# Patient Record
Sex: FEMALE | Race: WHITE | Employment: UNEMPLOYED | URBAN - METROPOLITAN AREA
[De-identification: names, ages, dates, MRNs, and addresses within clinical notes are randomized per-mention and may not be internally consistent; named-entity substitution may affect disease eponyms.]

---

## 2020-04-21 ENCOUNTER — VIRTUAL VISIT (OUTPATIENT)
Dept: FAMILY MEDICINE CLINIC | Age: 45
End: 2020-04-21

## 2020-04-21 ENCOUNTER — E-VISIT (OUTPATIENT)
Dept: FAMILY MEDICINE CLINIC | Age: 45
End: 2020-04-21

## 2020-04-21 VITALS — DIASTOLIC BLOOD PRESSURE: 109 MMHG | SYSTOLIC BLOOD PRESSURE: 160 MMHG | HEART RATE: 87 BPM

## 2020-04-21 DIAGNOSIS — E11.42 CONTROLLED TYPE 2 DIABETES MELLITUS WITH DIABETIC POLYNEUROPATHY, WITHOUT LONG-TERM CURRENT USE OF INSULIN (HCC): ICD-10-CM

## 2020-04-21 DIAGNOSIS — F99 INSOMNIA DUE TO OTHER MENTAL DISORDER: ICD-10-CM

## 2020-04-21 DIAGNOSIS — M79.7 FIBROMYALGIA: ICD-10-CM

## 2020-04-21 DIAGNOSIS — G93.2 INTRACRANIAL HYPERTENSION: ICD-10-CM

## 2020-04-21 DIAGNOSIS — F51.05 INSOMNIA DUE TO OTHER MENTAL DISORDER: ICD-10-CM

## 2020-04-21 DIAGNOSIS — Z76.89 ENCOUNTER TO ESTABLISH CARE: Primary | ICD-10-CM

## 2020-04-21 DIAGNOSIS — K21.9 GASTROESOPHAGEAL REFLUX DISEASE WITHOUT ESOPHAGITIS: ICD-10-CM

## 2020-04-21 DIAGNOSIS — R51.9 CHRONIC NONINTRACTABLE HEADACHE, UNSPECIFIED HEADACHE TYPE: ICD-10-CM

## 2020-04-21 DIAGNOSIS — F41.1 GAD (GENERALIZED ANXIETY DISORDER): ICD-10-CM

## 2020-04-21 DIAGNOSIS — L84 FOOT CALLUS: ICD-10-CM

## 2020-04-21 DIAGNOSIS — K62.5 RECTAL BLEEDING: ICD-10-CM

## 2020-04-21 DIAGNOSIS — Z98.2 S/P VP SHUNT: ICD-10-CM

## 2020-04-21 DIAGNOSIS — G89.29 CHRONIC NONINTRACTABLE HEADACHE, UNSPECIFIED HEADACHE TYPE: ICD-10-CM

## 2020-04-21 DIAGNOSIS — I10 ESSENTIAL HYPERTENSION: ICD-10-CM

## 2020-04-21 RX ORDER — METFORMIN HYDROCHLORIDE 500 MG/1
TABLET ORAL 2 TIMES DAILY WITH MEALS
COMMUNITY
End: 2020-04-21 | Stop reason: SDUPTHER

## 2020-04-21 RX ORDER — HYDROCHLOROTHIAZIDE 25 MG/1
25 TABLET ORAL DAILY
COMMUNITY
End: 2020-04-21 | Stop reason: SDUPTHER

## 2020-04-21 RX ORDER — DULOXETIN HYDROCHLORIDE 60 MG/1
60 CAPSULE, DELAYED RELEASE ORAL DAILY
Qty: 90 CAP | Refills: 0 | Status: SHIPPED | OUTPATIENT
Start: 2020-04-21 | End: 2020-09-15 | Stop reason: SDUPTHER

## 2020-04-21 RX ORDER — METFORMIN HYDROCHLORIDE 500 MG/1
1000 TABLET ORAL 2 TIMES DAILY WITH MEALS
Qty: 360 TAB | Refills: 0 | Status: SHIPPED | OUTPATIENT
Start: 2020-04-21 | End: 2020-07-15 | Stop reason: SDUPTHER

## 2020-04-21 RX ORDER — GABAPENTIN 600 MG/1
1200 TABLET ORAL 3 TIMES DAILY
COMMUNITY
End: 2020-04-27 | Stop reason: SDUPTHER

## 2020-04-21 RX ORDER — OMEPRAZOLE 40 MG/1
40 CAPSULE, DELAYED RELEASE ORAL DAILY
COMMUNITY
End: 2020-04-21 | Stop reason: SDUPTHER

## 2020-04-21 RX ORDER — DULOXETIN HYDROCHLORIDE 60 MG/1
60 CAPSULE, DELAYED RELEASE ORAL DAILY
COMMUNITY
End: 2020-04-21 | Stop reason: SDUPTHER

## 2020-04-21 RX ORDER — TRAZODONE HYDROCHLORIDE 50 MG/1
150 TABLET ORAL
Qty: 270 TAB | Refills: 0 | Status: SHIPPED | OUTPATIENT
Start: 2020-04-21 | End: 2020-07-31 | Stop reason: SDUPTHER

## 2020-04-21 RX ORDER — HYDROCHLOROTHIAZIDE 25 MG/1
25 TABLET ORAL DAILY
Qty: 90 TAB | Refills: 0 | Status: SHIPPED | OUTPATIENT
Start: 2020-04-21 | End: 2020-07-15 | Stop reason: SDUPTHER

## 2020-04-21 RX ORDER — TRAZODONE HYDROCHLORIDE 50 MG/1
100 TABLET ORAL
COMMUNITY
End: 2020-04-21 | Stop reason: SDUPTHER

## 2020-04-21 RX ORDER — OMEPRAZOLE 40 MG/1
40 CAPSULE, DELAYED RELEASE ORAL DAILY
Qty: 90 CAP | Refills: 0 | Status: SHIPPED | OUTPATIENT
Start: 2020-04-21 | End: 2020-07-22 | Stop reason: SDUPTHER

## 2020-04-21 RX ORDER — LISINOPRIL 10 MG/1
10 TABLET ORAL DAILY
Qty: 60 TAB | Refills: 0 | Status: SHIPPED | OUTPATIENT
Start: 2020-04-21 | End: 2020-06-10 | Stop reason: SDUPTHER

## 2020-04-21 NOTE — PROGRESS NOTES
Chief Complaint   Patient presents with    New Patient     new to the area   [address]     states she has DM, Fibromyalgia, GERD, and intracranial HTN. Hiatal Hernia.  Diabetes     states she needs follow up DM II     \"REVIEWED RECORD IN PREPARATION FOR VISIT AND HAVE OBTAINED THE NECESSARY DOCUMENTATION\"  1. Have you been to the ER, urgent care clinic since your last visit? Hospitalized since your last visit? No    2. Have you seen or consulted any other health care providers outside of the [address] [place] [address] since your last visit? Include any pap smears or colon screening.  No

## 2020-04-21 NOTE — PROGRESS NOTES
[facility] Note  HPI:   Nelsy Albert is a 39 y.o. female who presents to establish care. Previous Provider: In [place]. Chief Complaint   Patient presents with    New Patient     new to the area [facility]     states she has DM, Fibromyalgia, GERD, and intracranial HTN. Hiatal Hernia.  Diabetes     states she needs follow up DM II   I was in the office while conducting this encounter. Consent:  She and/or her healthcare decision maker is aware that this patient-initiated Telehealth encounter is a billable service, with coverage as determined by her insurance carrier. She is aware that she may receive a bill and has provided verbal consent to proceed: Yes    This virtual visit was conducted via [employer]. Pursuant to the emergency declaration under the [facility] waiver authority and the [employer] Act, this Virtual  Visit was conducted to reduce the patient's risk of exposure to COVID-19 and provide continuity of care for an established patient. Services were provided through a video synchronous discussion virtually to substitute for in-person clinic visit. Due to this being a TeleHealth evaluation, many elements of the physical examination are unable to be assessed. Total Time: minutes: 21-30 minutes. Nelys Albert is a 39 y.o. female who presents for Type 2 diabetes mellitus. Diagnosed 1 year ago. Last A1c was 7-8%. Current symptoms/problems include none. Known diabetic complications: peripheral neuropathy. Cardiovascular risk factors: smoking/ tobacco exposure, dyslipidemia, diabetes mellitus, obesity, hypertension  Current diabetic medications include: Metformin 500 mg BID. Eye exam current (within one year): no  Weight trend: stable  Prior visit with dietician: no  Current diet: tries to limit sweets.    Current exercise: ADL's  Performing nightly foot checks: Yes, has a callus on the left foot. Current monitoring regimen:   14 day agerave 133  30 day average 138  Daily in AM:   4/20 - 114     4/19-  137  4/18 - 182   3/28 - 129   2/27 - 153    Any episodes of hypoglycemia? no  Is She on ACE inhibitor or angiotensin II receptor blocker?: No.     History of intercranial HTN s/p  shunt. Reports chronic headaches are increasing in frequency and intensity. Described as pressure in the base of neck that radiates to head. Intensity is moderate to severe. Occurring a few times per week. Associated nausea. Current treatment includes tyelnol as needed. Needs to establish with Neurologist here in [place]. History of GERD. On Prilosec 40 mg daily. No symptoms on this therapy. History of anxiety, insomnia, fibromyalgia. Denies depressed mood. Anxiety fluctuates but is manageable currently. On Cymbalta 60 mg daily, Gabapentin 600 mg TID, Trazodone 100 mg nightly. Pain is manageable. Able to due ADL's. Chronic pain is mostly of back. Still having trouble sleeping despite taking Trazodone, also taking benadryl nightly as needed. She complains of rectal discomfort and bleeding. Occurring once weekly. Associated loose stools. Denies constipation or straining. Allergies   Allergen Reactions    Iodinated Contrast Media Hives    Iodine And Iodide Containing Products Hives    Morphine Hives    Naprosyn [Naproxen] Hives      There is no problem list on file for this patient. Past Medical History:   Diagnosis Date    Anxiety     Callus of foot     [facility]erv[facility] ([facility]     Diabetes ([facility]     GERD (gastroesophageal reflux disease)     Headache     Hypertension     intercranial HTN    shunt    Insomnia     Recurrent kidney stones     Vertigo       Past Surgical History:   Procedure Laterality Date    HX CSF SHUNT      HX HYSTERECTOMY  2000    HX OOPHORECTOMY  1999      No LMP recorded (lmp unknown). Patient has had a hysterectomy. Family History   Problem Relation Age of Onset    Lung Cancer Mother     No Known Problems Father     Kidney Disease Maternal Grandmother     No Known Problems Child     No Known Problems Child       Social History     Socioeconomic History    Marital status:      Spouse name: Not on file    Number of children: Not on file    Years of education: Not on file    Highest education level: Not on file   Occupational History    Not on file   Social Needs    Financial resource strain: Not on file    Food insecurity     Worry: Not on file     Inability: Not on file    Transportation needs     Medical: Not on file     Non-medical: Not on file   Tobacco Use    Smoking status: Former Smoker     Packs/day: 1.00     Years: 20.00     Pack years: 20.00     Types: Cigarettes     Last attempt to quit:      Years since quittin.3    Smokeless tobacco: Never Used   Substance and Sexual Activity    Alcohol use: Not Currently    Drug use: Never    Sexual activity: Yes     Partners: Male     Birth control/protection: Surgical   Lifestyle    Physical activity     Days per week: Not on file     Minutes per session: Not on file    Stress: Not on file   Relationships    Social connections     Talks on phone: Not on file     Gets together: Not on file     Attends Bahai service: Not on file     Active member of club or organization: Not on file     Attends meetings of clubs or organizations: Not on file     Relationship status: Not on file    Intimate partner violence     Fear of current or ex partner: Not on file     Emotionally abused: Not on file     Physically abused: Not on file     Forced sexual activity: Not on file   Other Topics Concern    Not on file   Social History Narrative    Moved from [place], for job opportunity for . Lives at home with  and 2 children. Filed for disability.              ROS:   Review of Systems   Constitutional: Negative for chills, diaphoresis, fever, malaise/fatigue and weight loss. HENT: Negative for congestion, ear pain, hearing loss, sinus pain, sore throat and tinnitus. Eyes: Negative for blurred vision and double vision. Respiratory: Negative for shortness of breath. Cardiovascular: Negative for chest pain, palpitations and leg swelling. Gastrointestinal: Positive for nausea. Negative for abdominal pain, blood in stool, constipation, diarrhea, heartburn, melena and vomiting. Genitourinary: Negative for dysuria, frequency, hematuria and urgency. Musculoskeletal: Negative for joint pain and myalgias. Chronic pain   Skin: Negative for itching and rash. Neurological: Positive for dizziness, tremors and headaches. Negative for tingling and weakness. Endo/Heme/Allergies: Negative for environmental allergies and polydipsia. Psychiatric/Behavioral: Negative for depression, hallucinations, memory loss, substance abuse and suicidal ideas. The patient has insomnia. The patient is not nervous/anxious. Physical Exam:     Visit Vitals  BP (!) 160/109 Comment: virtual visit only   Pulse 87   LMP  (LMP Unknown)        Vitals and Nurse Documentation reviewed. Physical Exam  Vitals signs reviewed. Constitutional:       General: She is not in acute distress. Appearance: Normal appearance. She is well-developed, well-groomed and normal weight. She is not ill-appearing or toxic-appearing. HENT:      Head: Normocephalic and atraumatic. Right Ear: Hearing normal.      Left Ear: Hearing normal.      Nose: Nose normal.      Mouth/Throat:      Lips: Pink. No lesions. Mouth: Mucous membranes are moist.   Eyes:      General: Lids are normal. Vision grossly intact. Extraocular Movements: Extraocular movements intact. Conjunctiva/sclera: Conjunctivae normal.   Neck:      Musculoskeletal: Normal range of motion.    Pulmonary:      Effort: Pulmonary effort is normal.   Neurological:      Mental Status: She is alert and oriented to person, place, and time. Gait: Gait is intact. Psychiatric:         Attention and Perception: Attention normal.         Mood and Affect: Mood normal.         Speech: Speech normal.         Behavior: Behavior is cooperative. Thought Content: Thought content normal.         Cognition and Memory: Cognition normal.         Judgment: Judgment normal.           Assessment/ Plan:     Diagnoses and all orders for this visit:    1. Encounter to establish care: Return for follow-up in 6 weeks. Office policies were discussed including hours of operation, on-call providers, and MyChart. 2. Intracranial hypertension: Longstanding issue, s/p  shunt. With complains of increasing headaches. Agreeable to follow-up with neurology. Discussed limiting tylenol for severe headaches as overuse could be contributing to symptoms.    -     REFERRAL TO NEUROLOGY    3. S/P  shunt: See #2  -     REFERRAL TO NEUROLOGY    4. Chronic nonintractable headache, unspecified headache type: See #2  -     REFERRAL TO NEUROLOGY    5. Essential hypertension: Uncontrolled. Start lisinopril 10 mg daily, continue HCTZ 25 mg daily. She will come in for baseline labs. DASH diet reviewed. -     lisinopriL (PRINIVIL, ZESTRIL) 10 mg tablet; Take 1 Tab by mouth daily. -     hydroCHLOROthiazide (HYDRODIURIL) 25 mg tablet; Take 1 Tab by mouth daily.  -     METABOLIC PANEL, COMPREHENSIVE    6. Controlled type 2 diabetes mellitus with diabetic polyneuropathy, without long-term current use of insulin ([facility]): Diagnosed about 1 year ago, control unclear. Limited AM BG readings for review ranging 114-180's. Increase metformin from 500 mg BID to 1,000 mg BID. She will come in for labs. 6 week follow-up. Advise home BG monitoring.   -     glucose blood VI test strips (ReliOn Prime Test Strips) strip; Check BG BID  -     metFORMIN (GLUCOPHAGE) 500 mg tablet; Take 2 Tabs by mouth two (2) times daily (with meals).   -     LIPID PANEL  - HEMOGLOBIN A1C WITH EAG  -     MICROALBUMIN, UR, RAND  -     METABOLIC PANEL, COMPREHENSIVE  -     REFERRAL TO OPHTHALMOLOGY    7. Rectal bleeding: Chronic intermittent issue. Possible hemorrhoids. Advised follow-up with GI and she is agreeable. -     REFERRAL TO GASTROENTEROLOGY  -     CBC WITH AUTOMATED DIFF    8. Fibromyalgia: Longstanding issue. Continue Cymbalta, also on gabapentin 600 mg TID. Consider referral to PM&R for additional treatment and therapy. -     DULoxetine (Cymbalta) 60 mg capsule; Take 1 Cap by mouth daily. 9. DEANN (generalized anxiety disorder): Longstanding issue, stable, on SNRI. Consider CBT. -     DULoxetine (Cymbalta) 60 mg capsule; Take 1 Cap by mouth daily. 10. Insomnia due to other mental disorder: Longstanding issue, not well controlled, increase trazodone from 100 to 50 mg nightly. -     traZODone (DESYREL) 50 mg tablet; Take 3 Tabs by mouth nightly. Indications: insomnia associated with depression    11. Gastroesophageal reflux disease without esophagitis: Stable, at goal. Continue current therapy. Consider dose reduction at next visit. -     omeprazole (PRILOSEC) 40 mg capsule; Take 1 Cap by mouth daily. 12. Foot callus: Referral to podiatry for eval and treatment.   -     REFERRAL TO PODIATRY      Follow-up and Dispositions    · Return in about 6 weeks (around 6/2/2020) for HTN, Diabetes.         Discussed expected course/resolution/complications of diagnosis in detail with patient.    Medication risks/benefits/costs/interactions/alternatives discussed with patient.    Pt was given an after visit summary which includes diagnoses, current medications & vitals.  Pt expressed understanding with the diagnosis and plan

## 2020-04-22 ENCOUNTER — VIRTUAL VISIT (OUTPATIENT)
Dept: NEUROLOGY | Age: 45
End: 2020-04-22

## 2020-04-22 ENCOUNTER — TELEPHONE (OUTPATIENT)
Dept: NEUROLOGY | Age: 45
End: 2020-04-22

## 2020-04-22 VITALS — WEIGHT: 220 LBS | BODY MASS INDEX: 36.65 KG/M2 | HEIGHT: 65 IN

## 2020-04-22 DIAGNOSIS — R51.9 DAILY HEADACHE: Primary | ICD-10-CM

## 2020-04-22 DIAGNOSIS — M79.7 FIBROMYALGIA: ICD-10-CM

## 2020-04-22 DIAGNOSIS — G44.86 CERVICOGENIC HEADACHE: ICD-10-CM

## 2020-04-22 DIAGNOSIS — R42 DIZZINESS: ICD-10-CM

## 2020-04-22 DIAGNOSIS — G93.2 IDIOPATHIC INTRACRANIAL HYPERTENSION: Primary | ICD-10-CM

## 2020-04-22 DIAGNOSIS — Z98.2 S/P VP SHUNT: ICD-10-CM

## 2020-04-22 DIAGNOSIS — R51.9 DAILY HEADACHE: ICD-10-CM

## 2020-04-22 DIAGNOSIS — R42 VERTIGO: ICD-10-CM

## 2020-04-22 RX ORDER — GABAPENTIN 600 MG/1
TABLET ORAL 3 TIMES DAILY
OUTPATIENT
Start: 2020-04-22

## 2020-04-22 RX ORDER — CYCLOBENZAPRINE HCL 5 MG
TABLET ORAL
Qty: 90 TAB | Refills: 1 | Status: SHIPPED | OUTPATIENT
Start: 2020-04-22 | End: 2020-05-21 | Stop reason: SDUPTHER

## 2020-04-22 NOTE — PROGRESS NOTES
Chief Complaint   Patient presents with    Headache     This is a telemedicine visit that was performed with the originating site at [facility] and the distant site at patient's home. Verbal consent to participate in video visit was obtained. The patient was identified by name and date of birth. This visit occurred during the Coronavirus [phone]) [facility] Emergency. I discussed with the patient the nature of our telemedicine visits, that:     I would evaluate the patient and recommend diagnostics and treatments based on my assessment   Our sessions are not being recorded and that personal health information is protected   Our team would provide follow up care in person if/when the patient needs it      [facility]  Carlos Rae is a 39 y.o. female who was evaluated over a video call today. She was sent for neurological consultation by Frank Gilliland NP. She recently moved to the area. She has history of idiopathic intracranial hypertension and had a  shunt implantation in 2010 at [facility]. She was having a lot of headaches and shunt helped significantly. Also has a history of cluster headaches and migraines but those have not occurred in years. Has fibromyalgia for which she takes gabapentin and Cymbalta. Over the past 2 weeks or so she has had a headache which she describes as mainly originating in the suboccipital neck region. It feels as if there is a tightness/spasm and it changes into a headache going all the way to the forehead. No associated photophobia, nausea or vomiting. No other focal motor or sensory deficits. She has it most part of the day but is able to sleep at night as she takes trazodone. It feels different than her migraines or headaches related to pseudotumor.       Past Medical History:   Diagnosis Date    Anxiety     Callus of foot     Cervical cancer (HCC)     Diabetes ([facility]     GERD (gastroesophageal reflux disease)  Headache     Hypertension     intercranial HTN    shunt    Insomnia     Recurrent kidney stones     Vertigo      Current Outpatient Medications   Medication Sig    cyclobenzaprine (FLEXERIL) 5 mg tablet 1 in the AM and 2 at night    gabapentin (NEURONTIN) 600 mg tablet Take  by mouth three (3) times daily.  lisinopriL (PRINIVIL, ZESTRIL) 10 mg tablet Take 1 Tab by mouth daily.  glucose blood VI test strips (ReliOn Prime Test Strips) strip Check BG BID    DULoxetine (Cymbalta) 60 mg capsule Take 1 Cap by mouth daily.  hydroCHLOROthiazide (HYDRODIURIL) 25 mg tablet Take 1 Tab by mouth daily.  metFORMIN (GLUCOPHAGE) 500 mg tablet Take 2 Tabs by mouth two (2) times daily (with meals).  omeprazole (PRILOSEC) 40 mg capsule Take 1 Cap by mouth daily.  traZODone (DESYREL) 50 mg tablet Take 3 Tabs by mouth nightly. Indications: insomnia associated with depression     No current facility-administered medications for this visit.       Allergies   Allergen Reactions    Iodinated Contrast Media Hives    Iodine And Iodide Containing Products Hives    Morphine Hives    Naprosyn [Naproxen] Hives     Family History   Problem Relation Age of Onset    Lung Cancer Mother     No Known Problems Father     Kidney Disease Maternal Grandmother     No Known Problems Child     No Known Problems Child      Social History     Tobacco Use    Smoking status: Former Smoker     Packs/day: 1.00     Years: 20.00     Pack years: 20.00     Types: Cigarettes     Last attempt to quit: 2018     Years since quittin.3    Smokeless tobacco: Never Used   Substance Use Topics    Alcohol use: Not Currently    Drug use: Never     Past Surgical History:   Procedure Laterality Date    HX CSF SHUNT      HX HYSTERECTOMY      HX OOPHORECTOMY           REVIEW OF SYSTEMS  Review of Systems - History obtained from the patient  Psychological ROS: negative  ENT ROS: negative  Hematological and Lymphatic ROS: negative  Endocrine ROS: negative  Respiratory ROS: no cough, shortness of breath, or wheezing  Cardiovascular ROS: no chest pain or dyspnea on exertion  Gastrointestinal ROS: no abdominal pain, change in bowel habits, or black or bloody stools  Genito-Urinary ROS: no dysuria, trouble voiding, or hematuria  Musculoskeletal ROS: negative  Dermatological ROS: negative      PHYSICAL EXAMINATION:    Visit Vitals  Ht 5' 5\" (1.651 m)   Wt 99.8 kg (220 lb)   BMI 36.61 kg/m²     Due to this being a TeleHealth evaluation, many elements of the physical examination are unable to be assessed. Exam:  NEUROLOGICAL EXAM:  General: Awake, alert, oriented x 3  CN: EOMI, facial strength normal and symmetric, hearing is intact  Motor: AG x 4  Reflexes: deferred  Coordination: No ataxia. Sensation: LT intact throughout  Gait: Steady      ASSESSMENT    ICD-10-CM ICD-9-CM    1. Idiopathic intracranial hypertension G93.2 348.2    2. Fibromyalgia M79.7 729.1    3. S/P  shunt Z98.2 V45.2    4. Daily headache R51 784.0    5. Cervicogenic headache R51 784.0 cyclobenzaprine (FLEXERIL) 5 mg tablet       DISCUSSION  Ms. Cate Rubio has a remote history of idiopathic intracranial hypertension and is status post ventriculoperitoneal shunt. Over the past 2 weeks says she has been experiencing headache originating in the neck and based on her description it appears to be cervicogenic headache possible related to underlying cervical degenerative disc/joint disease and associated muscle spasm. I have recommended trial of cyclobenzaprine 10 mg at night and 5 mg in the morning Home exercises to stretch and strengthen cervical paraspinals were also reviewed  If these measures do not help, we may consider neck imaging and possibly repeat brain imaging      Thank you for allowing me to participate in the care of Ms. Pierre. Please feel free to contact me if you have any questions. I will be happy to follow to follow her along with you.     Maicol Peñaloza Sydnee Chua MD  Diplomate, American Board of Psychiatry & Neurology (Neurology)  [place] Board of Psychiatry & Neurology (Clinical Neurophysiology)  Diplomate, American Board of Electrodiagnostic Medicine  This note will not be viewable in [address]

## 2020-04-22 NOTE — TELEPHONE ENCOUNTER
----- Message from [address] [place] [address] sent at 4/22/2020  2:02 PM EDT -----  Regarding: ROBERTO CARLOS Carey Brand / refill  Medication Refill    Caller (if not patient):      Relationship of caller (if not patient):      Best contact number(s):   [phone]      Name of medication and dosage if known:    Gabapentin 600 mg (no refills)   Glucose strips & meter (insurance won't cover need different Rx to cover by insurance)      Is patient out of this medication (yes/no):    yes      Pharmacy name:   [address] [place] [address] listed in chart? (yes/no): yes  Pharmacy phone number:      Details to clarify the request:   Blood pressure is doing very well.        Lisy Meek

## 2020-04-22 NOTE — TELEPHONE ENCOUNTER
PCP: Saray Galicia NP    Last appt: 4/21/2020  No future appointments. Requested Prescriptions     Pending Prescriptions Disp Refills    gabapentin (NEURONTIN) 600 mg tablet       Sig: Take  by mouth three (3) times daily.

## 2020-04-22 NOTE — TELEPHONE ENCOUNTER
Pt calling back, had an e visit this morning forgot to mention that she is pretty much dizzy most of the day, to the point where she feels like she is going to fall out of her seat.

## 2020-04-22 NOTE — PATIENT INSTRUCTIONS
PRESCRIPTION REFILL POLICY [facility] Statement to Patients April 1, 2014 In an effort to ensure the large volume of patient prescription refills is processed in the most efficient and expeditious manner, we are asking our patients to assist us by calling your Pharmacy for all prescription refills, this will include also your  Mail Order Pharmacy. The pharmacy will contact our office electronically to continue the refill process. Please do not wait until the last minute to call your pharmacy. We need at least 48 hours (2days) to fill prescriptions. We also encourage you to call your pharmacy before going to  your prescription to make sure it is ready. With regard to controlled substance prescription refill requests (narcotic refills) that need to be picked up at our office, we ask your cooperation by providing us with at least 72 hours (3days) notice that you will need a refill. We will not refill narcotic prescription refill requests after 4:00pm on any weekday, Monday through Thursday, or after 2:00pm on Fridays, or on the weekends. We encourage everyone to explore another way of getting your prescription refill request processed using [employer], our patient web portal through our electronic medical record system. [employer] is an efficient and effective way to communicate your medication request directly to the office and  downloadable as an puneet on your smart phone . [employer] also features a review functionality that allows you to view your medication list as well as leave messages for your physician. Are you ready to get connected? If so please review the attatched instructions or speak to any of our staff to get you set up right away! Thank you so much for your cooperation. Should you have any questions please contact our Practice Administrator. The Physicians and Staff,  [facility]

## 2020-04-22 NOTE — TELEPHONE ENCOUNTER
I gave patient this information. She expressed understanding and agreed to this plan.  I have faxed CT order to Coordination of Care and PT order to [facility] PT.

## 2020-04-22 NOTE — TELEPHONE ENCOUNTER
Per Dr. Emmett Lopez, In that case we should check CT brain and I recommend going for a course of physical and vestibular therapy. Edmund Alarcon will print referral/CT order

## 2020-04-22 NOTE — TELEPHONE ENCOUNTER
Per  60 day supply (180 tablets) was dispense on 4/1/2020 from [address] [place] [address] [phone] [facility]. Per , not due for refill until 6/1/2020.  Thank you

## 2020-04-23 LAB
ALBUMIN SERPL-MCNC: 4.8 G/DL (ref 3.8–4.8)
ALBUMIN/GLOB SERPL: 2.2 {RATIO} (ref 1.2–2.2)
ALP SERPL-CCNC: 79 IU/L (ref 39–117)
ALT SERPL-CCNC: 35 IU/L (ref 0–32)
AST SERPL-CCNC: 17 IU/L (ref 0–40)
BASOPHILS # BLD AUTO: 0.1 X10E3/UL (ref 0–0.2)
BASOPHILS NFR BLD AUTO: 1 %
BILIRUB SERPL-MCNC: 0.4 MG/DL (ref 0–1.2)
BUN SERPL-MCNC: 13 MG/DL (ref 6–24)
BUN/CREAT SERPL: 22 (ref 9–23)
CALCIUM SERPL-MCNC: 10 MG/DL (ref 8.7–10.2)
CHLORIDE SERPL-SCNC: 98 MMOL/L (ref 96–106)
CHOLEST SERPL-MCNC: 200 MG/DL (ref 100–199)
CO2 SERPL-SCNC: 26 MMOL/L (ref 20–29)
CREAT SERPL-MCNC: 0.6 MG/DL (ref 0.57–1)
EOSINOPHIL # BLD AUTO: 0.3 X10E3/UL (ref 0–0.4)
EOSINOPHIL NFR BLD AUTO: 3 %
ERYTHROCYTE [DISTWIDTH] IN BLOOD BY AUTOMATED COUNT: 12.4 % (ref 11.7–15.4)
EST. AVERAGE GLUCOSE BLD GHB EST-MCNC: 140 MG/DL
GLOBULIN SER CALC-MCNC: 2.2 G/DL (ref 1.5–4.5)
GLUCOSE SERPL-MCNC: 130 MG/DL (ref 65–99)
HBA1C MFR BLD: 6.5 % (ref 4.8–5.6)
HCT VFR BLD AUTO: 39.7 % (ref 34–46.6)
HDLC SERPL-MCNC: 45 MG/DL
HGB BLD-MCNC: 13.1 G/DL (ref 11.1–15.9)
IMM GRANULOCYTES # BLD AUTO: 0 X10E3/UL (ref 0–0.1)
IMM GRANULOCYTES NFR BLD AUTO: 1 %
INTERPRETATION, 910389: NORMAL
LDLC SERPL CALC-MCNC: 127 MG/DL (ref 0–99)
LYMPHOCYTES # BLD AUTO: 2.7 X10E3/UL (ref 0.7–3.1)
LYMPHOCYTES NFR BLD AUTO: 32 %
MCH RBC QN AUTO: 29 PG (ref 26.6–33)
MCHC RBC AUTO-ENTMCNC: 33 G/DL (ref 31.5–35.7)
MCV RBC AUTO: 88 FL (ref 79–97)
MICROALBUMIN UR-MCNC: <3 UG/ML
MONOCYTES # BLD AUTO: 0.7 X10E3/UL (ref 0.1–0.9)
MONOCYTES NFR BLD AUTO: 8 %
NEUTROPHILS # BLD AUTO: 4.7 X10E3/UL (ref 1.4–7)
NEUTROPHILS NFR BLD AUTO: 55 %
PLATELET # BLD AUTO: 392 X10E3/UL (ref 150–450)
POTASSIUM SERPL-SCNC: 4.4 MMOL/L (ref 3.5–5.2)
PROT SERPL-MCNC: 7 G/DL (ref 6–8.5)
RBC # BLD AUTO: 4.51 X10E6/UL (ref 3.77–5.28)
SODIUM SERPL-SCNC: 139 MMOL/L (ref 134–144)
TRIGL SERPL-MCNC: 139 MG/DL (ref 0–149)
VLDLC SERPL CALC-MCNC: 28 MG/DL (ref 5–40)
WBC # BLD AUTO: 8.5 X10E3/UL (ref 3.4–10.8)

## 2020-04-23 NOTE — TELEPHONE ENCOUNTER
[facility] pharmacist verified that patient received Gabapentin 600mg tab. Take TID   Disp:  180 tabs. Picked up on 4/2/2020.

## 2020-04-23 NOTE — TELEPHONE ENCOUNTER
[facility] [phone] (home)  that refill would be due on 6/1/20. Too early to fill at this time.

## 2020-04-24 ENCOUNTER — PATIENT MESSAGE (OUTPATIENT)
Dept: FAMILY MEDICINE CLINIC | Age: 45
End: 2020-04-24

## 2020-04-24 DIAGNOSIS — M79.7 FIBROMYALGIA: Primary | ICD-10-CM

## 2020-04-24 NOTE — PROGRESS NOTES
Hi [facility],    Your A1c was at goal at 6.5%. We do not have to make changes to your diabetes regimen at this time. Please continue to focus on routine exercise and follow a well balanced low carbohydrate diet. We will repeat you A1c in 6 months. Your LDL (bad) cholesterol was mildly elevated and your HDL (good) cholesterol was at goal. According to the [facility] [employer] of Cardiology) and the AHA ([address] [place] [address]) ASCVD Risk Calculator (cardiovascular risk calculation based on age, gender, ethnicity, blood pressure, cholesterol, smoking history, any diabetes history, heart meds): your calculated 10 yr cardiovascular risk is  Low at 4% for heart attack or stroke. Based on these scores according to AHA/ACC guidelines, statin medication is not warranted at this time. We will monitor your cholesterol every year. Your urine microalbumin level was at goal.     One of your liver enzymes was just above normal range at 35, (normal range 0-32). We will continue to monitor this and repeat you lab in 6 months. The rest of your labs are at stable. Please let me know if you have any additional questions.     Ashly Pradhan NP

## 2020-04-27 RX ORDER — GABAPENTIN 600 MG/1
1200 TABLET ORAL 3 TIMES DAILY
Qty: 180 TAB | Refills: 0 | Status: SHIPPED | OUTPATIENT
Start: 2020-05-01 | End: 2020-06-02 | Stop reason: SDUPTHER

## 2020-04-27 NOTE — TELEPHONE ENCOUNTER
From: Ramses Owens  To: Renee Zepeda NP  Sent: 4/24/2020 9:32 PM EDT  Subject: Prescription Question    Here are the images of my current prescriptions  I will need a refill for the first of may.   Thank you  Ramses Ownes

## 2020-04-28 ENCOUNTER — APPOINTMENT (OUTPATIENT)
Dept: PHYSICAL THERAPY | Age: 45
End: 2020-04-28

## 2020-04-28 ENCOUNTER — VIRTUAL VISIT (OUTPATIENT)
Dept: FAMILY MEDICINE CLINIC | Age: 45
End: 2020-04-28

## 2020-04-28 DIAGNOSIS — G43.001 MIGRAINE WITHOUT AURA AND WITH STATUS MIGRAINOSUS, NOT INTRACTABLE: ICD-10-CM

## 2020-04-28 DIAGNOSIS — R25.2 MUSCLE CRAMPS: Primary | ICD-10-CM

## 2020-04-28 RX ORDER — BUTALBITAL, ACETAMINOPHEN AND CAFFEINE 50; 325; 40 MG/1; MG/1; MG/1
TABLET ORAL
Qty: 20 TAB | Refills: 0 | Status: SHIPPED | OUTPATIENT
Start: 2020-04-28 | End: 2020-05-18 | Stop reason: SDUPTHER

## 2020-04-28 NOTE — PROGRESS NOTES
Linda Long  39 y.o. female  1975  [address] [address]  [id number]     [facility] PR[facility]       Encounter Date: 4/28/2020           Established Patient Visit Note: Adryan Sawant NP    Reason for Appointment:  Chief Complaint   Patient presents with    Muscle Pain    Headache       History of Present Illness:  History provided by patient    Linda Long is a 39 y.o. female who presents today for:  Mucle cramps in her hands for months. She has tried muscle relaxant without help. Migraine headaches. She saw neurologist for migraine headaches and waiting to gt her head CT. She is still having headaches and requesting some thing else besides tylenol, since it hasn't helped her       Review of Systems  Review of Systems   Constitutional: Negative. Respiratory: Negative. Cardiovascular: Negative. Musculoskeletal:        Muscle cramps in both hands   Neurological: Positive for headaches. Allergies: Iodinated contrast media; Iodine and iodide containing products; Morphine; and Naprosyn [naproxen]    Medications: (Updated to reflect final medication list after visit)    Current Outpatient Medications:     butalbital-acetaminophen-caffeine (FIORICET, ESGIC) -40 mg per tablet, Take 1 tab every 12 hours as needed for migraine headaches, Disp: 20 Tab, Rfl: 0    [START ON 5/1/2020] gabapentin (NEURONTIN) 600 mg tablet, Take 2 Tabs by mouth three (3) times daily. Max Daily Amount: 3,600 mg., Disp: 180 Tab, Rfl: 0    cyclobenzaprine (FLEXERIL) 5 mg tablet, 1 in the AM and 2 at night, Disp: 90 Tab, Rfl: 1    lisinopriL (PRINIVIL, ZESTRIL) 10 mg tablet, Take 1 Tab by mouth daily. , Disp: 60 Tab, Rfl: 0    glucose blood VI test strips (ReliOn Prime Test Strips) strip, Check BG BID, Disp: 100 Strip, Rfl: 5    DULoxetine (Cymbalta) 60 mg capsule, Take 1 Cap by mouth daily. , Disp: 90 Cap, Rfl: 0    hydroCHLOROthiazide (HYDRODIURIL) 25 mg tablet, Take 1 Tab by mouth daily. , Disp: 90 Tab, Rfl: 0    metFORMIN (GLUCOPHAGE) 500 mg tablet, Take 2 Tabs by mouth two (2) times daily (with meals). , Disp: 360 Tab, Rfl: 0    omeprazole (PRILOSEC) 40 mg capsule, Take 1 Cap by mouth daily. , Disp: 90 Cap, Rfl: 0    traZODone (DESYREL) 50 mg tablet, Take 3 Tabs by mouth nightly. Indications: insomnia associated with depression, Disp: 270 Tab, Rfl: 0    History  Patient Care Team:  Home Norman NP as PCP - General (Nurse Practitioner)  Home Norman NP as PCP - [facility]    Past Medical History: she has a past medical history of Anxiety, Callus of foot, Cervical cancer ([address] Diabetes ([address] GERD (gastroesophageal reflux disease), Headache, Hypertension, Insomnia, Recurrent kidney stones, and Vertigo. Past Surgical History: she has a past surgical history that includes hx csf shunt; hx hysterectomy (2000); and hx oophorectomy (1999). Family Medical History: family history includes Kidney Disease in her maternal grandmother; Epifanio Navarro in her mother; No Known Problems in her child, child, and father. Social History: she reports that she quit smoking about 2 years ago. Her smoking use included cigarettes. She has a 20.00 pack-year smoking history. She has never used smokeless tobacco. She reports previous alcohol use. She reports that she does not use drugs. No specialty comments available. Objective:   Vital Signs  Unable to obtain vital signs today as patient does not have equipment for this at home    Physical Exam  Constitutional:       Appearance: Normal appearance. Neurological:      Mental Status: She is alert. Psychiatric:         Mood and Affect: Mood normal.         Thought Content: Thought content normal.         Assessment & Plan:    1. Muscle cramps  Advised to take over the counter magnesium and calcium pills 500/500, 1 tab daily  Continue with her muscle relaxants     2.  Migraine without aura and with status migrainosus, not intractable    - butalbital-acetaminophen-caffeine (FIORICET, ESGIC) -40 mg per tablet; Take 1 tab every 12 hours as needed for migraine headaches  Dispense: 20 Tab; Refill: 0          I was in the office while conducting this encounter. Consent:  She and/or her healthcare decision maker is aware that this patient-initiated Telehealth encounter is a billable service, with coverage as determined by her insurance carrier. She is aware that she may receive a bill and has provided verbal consent to proceed: Yes    This virtual visit was conducted via [address] Pursuant to the emergency declaration under the [address] waiver authority and the [employer] Act, this Virtual  Visit was conducted to reduce the patient's risk of exposure to COVID-19 and provide continuity of care for an established patient. Services were provided through a video synchronous discussion virtually to substitute for in-person clinic visit. Due to this being a TeleHealth evaluation, many elements of the physical examination are unable to be assessed. Total Time: minutes: 11-20 minutes. I have discussed the diagnosis with the patient and the intended plan as seen in the above orders. The patient has received an after-visit summary along with patient information handout. I have discussed medication side effects and warnings with the patient as well.     Disposition        Willena Osler, NP

## 2020-04-30 ENCOUNTER — TELEPHONE (OUTPATIENT)
Dept: FAMILY MEDICINE CLINIC | Age: 45
End: 2020-04-30

## 2020-04-30 NOTE — TELEPHONE ENCOUNTER
Called, spoke to pt. Two pt identifiers confirmed. Writer made patient aware that she could buy Magnesium and one calcium pill 400 to 500 mg daily. Take them  Together per NP . Patient stated would like them in prescription it is cheaper. Writer stated will let NP know. James moore

## 2020-05-01 DIAGNOSIS — R25.2 MUSCLE CRAMPS: Primary | ICD-10-CM

## 2020-05-01 RX ORDER — ASCORBIC ACID 1000 MG
TABLET, EXTENDED RELEASE ORAL
Qty: 30 TAB | Refills: 1 | Status: SHIPPED | OUTPATIENT
Start: 2020-05-01 | End: 2020-06-05

## 2020-05-05 ENCOUNTER — E-VISIT (OUTPATIENT)
Dept: FAMILY MEDICINE CLINIC | Age: 45
End: 2020-05-05

## 2020-05-05 ENCOUNTER — TELEPHONE (OUTPATIENT)
Dept: FAMILY MEDICINE CLINIC | Age: 45
End: 2020-05-05

## 2020-05-08 ENCOUNTER — VIRTUAL VISIT (OUTPATIENT)
Dept: FAMILY MEDICINE CLINIC | Age: 45
End: 2020-05-08

## 2020-05-08 DIAGNOSIS — R19.7 ACUTE DIARRHEA: Primary | ICD-10-CM

## 2020-05-08 DIAGNOSIS — R11.0 NAUSEA: ICD-10-CM

## 2020-05-08 RX ORDER — LOPERAMIDE HCL 2 MG
TABLET ORAL
Qty: 24 TAB | Refills: 0 | Status: SHIPPED | OUTPATIENT
Start: 2020-05-08 | End: 2020-06-05 | Stop reason: SDUPTHER

## 2020-05-08 RX ORDER — ONDANSETRON 4 MG/1
4 TABLET, ORALLY DISINTEGRATING ORAL
Qty: 12 TAB | Refills: 0 | Status: SHIPPED | OUTPATIENT
Start: 2020-05-08 | End: 2020-06-25 | Stop reason: SDUPTHER

## 2020-05-08 NOTE — PROGRESS NOTES
[facility] Note  Subjective:      Will Goodpasture is a 39 y.o. female who presents for an acute visit with the following chief complaints. Chief Complaint   Patient presents with    Diarrhea     I was in the office while conducting this encounter. Consent:  She and/or her healthcare decision maker is aware that this patient-initiated Telehealth encounter is a billable service, with coverage as determined by her insurance carrier. She is aware that she may receive a bill and has provided verbal consent to proceed: Yes    This virtual visit was conducted via [address] Pursuant to the emergency declaration under the [address] [facility] waiver authority and the [employer] and [employer] Act, this Virtual  Visit was conducted to reduce the patient's risk of exposure to COVID-19 and provide continuity of care for an established patient. Services were provided through a video synchronous discussion virtually to substitute for in-person clinic visit. Due to this being a TeleHealth evaluation, many elements of the physical examination are unable to be assessed. Total Time: minutes: 11-20 minutes. Diarrhea  Patient complains of diarrhea. Onset of diarrhea was 3-4 days ago. Diarrhea is occurring approximately 8-10 times per day. Patient describes diarrhea as dark brown and watery, some mucous. Diarrhea has been associated with mild nausea, mild malaise. Cramping periumbilical abdominal pain that is intermittent, mostly occurring prior to BM or after eating, worse with raw vegetables or salads. No fevers or chills. Appetite is good and she is tolerating PO intake. Patient denies significant abdominal pain, fever, blood in stool, recent antibiotic use, illness in household contacts, recent camping, recent travel, unintentional weight loss, suspicious food, no new medications. Denies URI symptoms.   Previous visits for diarrhea: yes, last episode was several months ago. Tends to also have chronic looser stool since removal of gallbladder a few years ago. Evaluation to date: none. Treatment to date: rest, Pepto not helpful. Current Outpatient Medications   Medication Sig Dispense Refill    loperamide (IMMODIUM) 2 mg tablet Take 4 mg PO at onset of diarrhea, then take 2 mg PO after each loose stool up to 4 times daily (maximum: 16 mg/day) 24 Tab 0    gabapentin (NEURONTIN) 600 mg tablet Take 2 Tabs by mouth three (3) times daily. Max Daily Amount: 3,600 mg. 180 Tab 0    cyclobenzaprine (FLEXERIL) 5 mg tablet 1 in the AM and 2 at night 90 Tab 1    lisinopriL (PRINIVIL, ZESTRIL) 10 mg tablet Take 1 Tab by mouth daily. 60 Tab 0    glucose blood VI test strips (ReliOn Prime Test Strips) strip Check BG  Strip 5    DULoxetine (Cymbalta) 60 mg capsule Take 1 Cap by mouth daily. 90 Cap 0    hydroCHLOROthiazide (HYDRODIURIL) 25 mg tablet Take 1 Tab by mouth daily. 90 Tab 0    metFORMIN (GLUCOPHAGE) 500 mg tablet Take 2 Tabs by mouth two (2) times daily (with meals). 360 Tab 0    omeprazole (PRILOSEC) 40 mg capsule Take 1 Cap by mouth daily. 90 Cap 0    traZODone (DESYREL) 50 mg tablet Take 3 Tabs by mouth nightly. Indications: insomnia associated with depression 270 Tab 0    ondansetron (ZOFRAN ODT) 4 mg disintegrating tablet Take 1 Tab by mouth every eight (8) hours as needed for Nausea.  12 Tab 0    Ca carb-Ca gluc-Mg ox-Mg gluco (Calcium Magnesium) 500 mg calcium -250 mg tab Take 1 tab po daily 30 Tab 1    butalbital-acetaminophen-caffeine (FIORICET, ESGIC) -40 mg per tablet Take 1 tab every 12 hours as needed for migraine headaches 20 Tab 0     Allergies   Allergen Reactions    Iodinated Contrast Media Hives    Iodine And Iodide Containing Products Hives    Morphine Hives    Naprosyn [Naproxen] Hives     Past Medical History:   Diagnosis Date    Anxiety     Callus of foot     [facility]     Diabetes [facility]     GERD (gastroesophageal reflux disease)     Headache     Hypertension     intercranial HTN    shunt    Insomnia     Recurrent kidney stones     Vertigo        ROS:   Complete review of systems was reviewed with pertinent information listed in HPI. Review of Systems   Constitutional: Positive for malaise/fatigue. Negative for chills, diaphoresis, fever and weight loss. HENT: Negative for congestion, ear pain, hearing loss, sinus pain, sore throat and tinnitus. Eyes: Negative for blurred vision and double vision. Respiratory: Negative for cough and shortness of breath. Cardiovascular: Negative for chest pain, palpitations and leg swelling. Gastrointestinal: Positive for abdominal pain, diarrhea and nausea. Negative for blood in stool, constipation, heartburn, melena and vomiting. Genitourinary: Negative for dysuria, frequency, hematuria and urgency. Musculoskeletal: Negative for joint pain and myalgias. Skin: Negative for itching and rash. Neurological: Negative for dizziness, tingling, weakness and headaches. Endo/Heme/Allergies: Negative for polydipsia. Psychiatric/Behavioral: Negative. Objective:     Visit Vitals  LMP  (LMP Unknown)       Vitals and Nurse Documentation reviewed. Physical Exam  Constitutional:       General: She is not in acute distress. Appearance: Normal appearance. She is well-developed, well-groomed and normal weight. She is not ill-appearing, toxic-appearing or diaphoretic. HENT:      Head: Normocephalic and atraumatic. Right Ear: Hearing normal.      Left Ear: Hearing normal.      Nose: No nasal deformity. Mouth/Throat:      Lips: Pink. No lesions. Mouth: Mucous membranes are moist.   Eyes:      General: Lids are normal.      Conjunctiva/sclera:      Right eye: Right conjunctiva is not injected. Left eye: Left conjunctiva is not injected.    Pulmonary:      Effort: Pulmonary effort is normal. Neurological:      Mental Status: She is alert and oriented to person, place, and time. Psychiatric:         Attention and Perception: Attention normal.         Mood and Affect: Mood normal.         Speech: Speech normal.         Behavior: Behavior normal. Behavior is cooperative. Thought Content: Thought content normal.         Assessment/Plan:     Diagnoses and all orders for this visit:    1. Acute diarrhea: Acute diarrhea with associated nausea and periumbilical cramping prior to BM and after meals. She appears well, she is in no distress. No fevers, hematochezia, melena or significant abdominal pain. She is tolerating PO intake. Discussed acute diarrhea likely secondary to viral infection. However, given frequency of stools and mucous, will obtain stool studies. Advised bland diet, increase hydration, trial immodium PRN. Zofran PRN for nausea. Advised RTC for worsening symptoms or if symptoms do not resolve. -     CULTURE, STOOL  -     OVA & PARASITES, STOOL  -     WBC, STOOL  -     PANCREATIC ELASTASE, FECAL  -     loperamide (IMMODIUM) 2 mg tablet; Take 4 mg PO at onset of diarrhea, then take 2 mg PO after each loose stool up to 4 times daily (maximum: 16 mg/day)    2. Nausea  -     ondansetron (ZOFRAN ODT) 4 mg disintegrating tablet; Take 1 Tab by mouth every eight (8) hours as needed for Nausea. Follow-up and Dispositions    · Return if symptoms worsen or fail to improve.          Discussed expected course/resolution/complications of diagnosis in detail with patient.    Medication risks/benefits/costs/interactions/alternatives discussed with patient.    Pt was given an after visit summary which includes diagnoses, current medications & vitals.  Pt expressed understanding with the diagnosis and plan

## 2020-05-16 ENCOUNTER — PATIENT MESSAGE (OUTPATIENT)
Dept: FAMILY MEDICINE CLINIC | Age: 45
End: 2020-05-16

## 2020-05-18 ENCOUNTER — OFFICE VISIT (OUTPATIENT)
Dept: PRIMARY CARE CLINIC | Age: 45
End: 2020-05-18

## 2020-05-18 DIAGNOSIS — R50.9 FEVER, UNSPECIFIED FEVER CAUSE: Primary | ICD-10-CM

## 2020-05-18 DIAGNOSIS — G43.001 MIGRAINE WITHOUT AURA AND WITH STATUS MIGRAINOSUS, NOT INTRACTABLE: ICD-10-CM

## 2020-05-18 DIAGNOSIS — R05.9 COUGH: ICD-10-CM

## 2020-05-18 RX ORDER — BUTALBITAL, ACETAMINOPHEN AND CAFFEINE 50; 325; 40 MG/1; MG/1; MG/1
TABLET ORAL
Qty: 30 TAB | Refills: 0 | Status: SHIPPED | OUTPATIENT
Start: 2020-05-18 | End: 2020-08-13

## 2020-05-18 RX ORDER — ALBUTEROL SULFATE 90 UG/1
2 AEROSOL, METERED RESPIRATORY (INHALATION)
Qty: 1 INHALER | Refills: 0 | Status: SHIPPED | OUTPATIENT
Start: 2020-05-18 | End: 2020-08-14 | Stop reason: SDUPTHER

## 2020-05-18 NOTE — PROGRESS NOTES
Patient was seen at [facility] drive thru flu clinic. Please seen scanned form for additional visit documentation. 4 days low grade tests, cough productive of yellow mucous, cp from cough. Mild VALENCIA. Albuterol helps.    + nausea and mild lower abd discomfort. Diarrhea x 2 weeks. Sent by PCP for COVID testing. LUNGS; - mild scattered exp wheezing. No egophany, rhonchi or rales. No increased work of breathing. Talking in full sentences. COVID tested.     Renewed Albuterol

## 2020-05-19 DIAGNOSIS — G43.819 OTHER MIGRAINE WITHOUT STATUS MIGRAINOSUS, INTRACTABLE: ICD-10-CM

## 2020-05-19 DIAGNOSIS — G93.2 IDIOPATHIC INTRACRANIAL HYPERTENSION: ICD-10-CM

## 2020-05-19 DIAGNOSIS — G44.86 CERVICOGENIC HEADACHE: Primary | ICD-10-CM

## 2020-05-19 RX ORDER — VENLAFAXINE HYDROCHLORIDE 75 MG/1
75 CAPSULE, EXTENDED RELEASE ORAL DAILY
Qty: 30 CAP | Refills: 1 | Status: SHIPPED | OUTPATIENT
Start: 2020-05-19 | End: 2020-07-15

## 2020-05-20 ENCOUNTER — TELEPHONE (OUTPATIENT)
Dept: FAMILY MEDICINE CLINIC | Age: 45
End: 2020-05-20

## 2020-05-20 ENCOUNTER — VIRTUAL VISIT (OUTPATIENT)
Dept: FAMILY MEDICINE CLINIC | Age: 45
End: 2020-05-20

## 2020-05-20 ENCOUNTER — TELEPHONE (OUTPATIENT)
Dept: INTERNAL MEDICINE CLINIC | Age: 45
End: 2020-05-20

## 2020-05-20 DIAGNOSIS — U07.1 COVID-19: Primary | ICD-10-CM

## 2020-05-20 DIAGNOSIS — R05.9 COUGH: ICD-10-CM

## 2020-05-20 DIAGNOSIS — J45.21 MILD INTERMITTENT ASTHMATIC BRONCHITIS WITH ACUTE EXACERBATION: ICD-10-CM

## 2020-05-20 PROBLEM — J45.20 MILD INTERMITTENT ASTHMA: Status: ACTIVE | Noted: 2020-05-20

## 2020-05-20 LAB — SARS-COV-2, NAA: DETECTED

## 2020-05-20 RX ORDER — BENZONATATE 200 MG/1
200 CAPSULE ORAL
Qty: 21 CAP | Refills: 0 | Status: SHIPPED | OUTPATIENT
Start: 2020-05-20 | End: 2020-05-27

## 2020-05-20 RX ORDER — AZITHROMYCIN 250 MG/1
TABLET, FILM COATED ORAL
Qty: 6 TAB | Refills: 0 | Status: SHIPPED | OUTPATIENT
Start: 2020-05-20 | End: 2020-05-25

## 2020-05-20 NOTE — TELEPHONE ENCOUNTER
Jessica Turner MD has notified patient of results and provided counseling.  Please see result note for more details

## 2020-05-20 NOTE — TELEPHONE ENCOUNTER
Incoming Critical labs from Willene Favre at [facility]. Patients name and  verified. Emma reported COVID-19 value detected. Emma reported labs will be faxed to our office.

## 2020-05-20 NOTE — TELEPHONE ENCOUNTER
Called patient and informed COVID testing was +. Still with cough and some chest pain with coughing but denies worsening anaya or sob. Discussed isolation until 3 days fever free without meds. Call PCP if breathing worsens.

## 2020-05-20 NOTE — PROGRESS NOTES
VIRTUAL VISIT    Chief Complaint   Patient presents with    Cough     Covid Positive       HISTORY OF PRESENT ILLNESS   HPI  Patient was seen at our flu clinic on 5/18 for 1 week h/o congestion, nasal drainage, low grade fever 99.7, progressively worsening productive, barking cough, chest tightness, sob and wheezing. She was tested for Covid which she was notified today was positive. At that visit her Albuterol inhaler was renewed which she had not needed for close to a year til this current exacerbation (states her asthma usually only flares once a year now in response to weather or bronchitis). She has been using the Albuterol about twice a day since then and it does help the wheezing and breathing. However the barking cough persists. It is still productive but mostly light in color. She states this chest congestion, bark, and painful cough are typical of the bronchitis she gets once a year. She is taking sudafed and Robitussin prn w/o relief. She had been taking Tylenol but has not had any more fevers since 2 days ago. She had some sore throat early on but that has gone away. REVIEW OF SYMPTOMS   Review of Systems   Constitutional: Negative for fever (resolved the past 2 days). HENT: Negative for ear pain, sinus pain and sore throat. Respiratory: Negative for hemoptysis. Gastrointestinal: Negative.         PROBLEM LIST/MEDICAL HISTORY     Problem List  Date Reviewed: 5/20/2020          Codes Class Noted    Mild intermittent asthma ICD-10-CM: J45.20  ICD-9-CM: 493.90  5/20/2020    Overview Signed 5/20/2020  2:07 PM by Sunny Flores MD     Diagnosed in her teens and again since her 31's, in adulthood flares about once a year             Vertigo ICD-10-CM: R42  ICD-9-CM: 780.4  Unknown        Recurrent kidney stones ICD-10-CM: N20.0  ICD-9-CM: 592.0  Unknown        Insomnia ICD-10-CM: G47.00  ICD-9-CM: 780.52  Unknown        Hypertension ICD-10-CM: I10  ICD-9-CM: 401.9  Unknown    Overview Signed 5/20/2020  2:08 PM by Chelsey Robledo MD     intercranial HTN    shunt             Headache ICD-10-CM: R51  ICD-9-CM: 784.0  Unknown        GERD (gastroesophageal reflux disease) ICD-10-CM: K21.9  ICD-9-CM: 530.81  Unknown        Diabetes mellitus type 2, noninsulin dependent ([facility] ICD-10-CM: E11.9  ICD-9-CM: 250.00  Unknown        Cervical cancer ([facility] ICD-10-CM: C53.9  ICD-9-CM: 180.9  Unknown        Callus of foot ICD-10-CM: L84  ICD-9-CM: 700  Unknown        Anxiety ICD-10-CM: F41.9  ICD-9-CM: 300.00  Unknown                  PAST SURGICAL HISTORY     Past Surgical History:   Procedure Laterality Date    HX CHOLECYSTECTOMY      HX CSF SHUNT      HX HERNIA REPAIR      HX HYSTERECTOMY  2000    HX LITHOTRIPSY      HX OOPHORECTOMY  1999         MEDICATIONS     Current Outpatient Medications   Medication Sig    venlafaxine-SR (EFFEXOR-XR) 75 mg capsule Take 1 Cap by mouth daily.  butalbital-acetaminophen-caffeine (FIORICET, ESGIC) -40 mg per tablet Take 1 tab every 12 hours as needed for migraine headaches    albuterol (PROVENTIL HFA, VENTOLIN HFA, PROAIR HFA) 90 mcg/actuation inhaler Take 2 Puffs by inhalation every six (6) hours as needed for Wheezing or Cough.  loperamide (IMMODIUM) 2 mg tablet Take 4 mg PO at onset of diarrhea, then take 2 mg PO after each loose stool up to 4 times daily (maximum: 16 mg/day)    ondansetron (ZOFRAN ODT) 4 mg disintegrating tablet Take 1 Tab by mouth every eight (8) hours as needed for Nausea.  Ca carb-Ca gluc-Mg ox-Mg gluco (Calcium Magnesium) 500 mg calcium -250 mg tab Take 1 tab po daily    gabapentin (NEURONTIN) 600 mg tablet Take 2 Tabs by mouth three (3) times daily. Max Daily Amount: 3,600 mg.  cyclobenzaprine (FLEXERIL) 5 mg tablet 1 in the AM and 2 at night    lisinopriL (PRINIVIL, ZESTRIL) 10 mg tablet Take 1 Tab by mouth daily.     glucose blood VI test strips (ReliOn Prime Test Strips) strip Check BG BID    DULoxetine (Cymbalta) 60 mg capsule Take 1 Cap by mouth daily.  hydroCHLOROthiazide (HYDRODIURIL) 25 mg tablet Take 1 Tab by mouth daily.  metFORMIN (GLUCOPHAGE) 500 mg tablet Take 2 Tabs by mouth two (2) times daily (with meals).  omeprazole (PRILOSEC) 40 mg capsule Take 1 Cap by mouth daily.  traZODone (DESYREL) 50 mg tablet Take 3 Tabs by mouth nightly. Indications: insomnia associated with depression     No current facility-administered medications for this visit. ALLERGIES     Allergies   Allergen Reactions    Iodinated Contrast Media Hives    Iodine And Iodide Containing Products Hives    Morphine Hives    Naprosyn [Naproxen] Hives          SOCIAL HISTORY     Social History     Socioeconomic History    Marital status:      Spouse name: Not on file    Number of children: Not on file    Years of education: Not on file    Highest education level: Not on file   Tobacco Use    Smoking status: Former Smoker     Packs/day: 1.00     Years: 20.00     Pack years: 20.00     Types: Cigarettes     Last attempt to quit: 2018     Years since quittin.3    Smokeless tobacco: Never Used   Substance and Sexual Activity    Alcohol use: Not Currently    Drug use: Never    Sexual activity: Yes     Partners: Male     Birth control/protection: Surgical     Comment: Hysterectomy   Social History Narrative    Moved from [place], for job opportunity for . Lives at home with  and 2 children. Filed for disability. IMMUNIZATIONS    There is no immunization history on file for this patient. FAMILY HISTORY     Family History   Problem Relation Age of Onset    Lung Cancer Mother     No Known Problems Father     Kidney Disease Maternal Grandmother     No Known Problems Child     No Known Problems Child          VITALS   Vitals limited due to virtual visit.  Self reported data collected today:  None available  LMP  S/P Hysterectomy        PHYSICAL EXAMINATION   Physical Exam  Constitutional:       General: She is not in acute distress. Pulmonary:      Effort: Pulmonary effort is normal. No respiratory distress. Comments: Repeated deep barking cough        DIAGNOSTIC DATA         LABORATORY DATA     Results for orders placed or performed in visit on 05/18/20   NOVEL CORONAVIRUS (COVID-19)   Result Value Ref Range    SARS-CoV-2, NORA Detected (A) Not Detected        ASSESSMENT & PLAN       ICD-10-CM ICD-9-CM    1. COVID-19 U07.1 079.89    2. Cough R05 786.2 benzonatate (TESSALON) 200 mg capsule   3. Mild intermittent asthmatic bronchitis with acute exacerbation J45.21 493.92 azithromycin (ZITHROMAX) 250 mg tablet     Zpack as directed  Tessalon Perles 200 mg tid prn  Albuterol HFA 2 puffs q4-6 hr prn  Rest, push fluids, Tylenol q6 hr prn  She does not work as she is on disability for other health conditions. But she is instructed to continue self quarantine per guidelines. Call back if symptoms persist beyond expectant course. ER for any acute changes or deterioration in any symptoms. ----------------------------------------------------------------------------------------------------------------------------------------------------------  Patient is being evaluated by a video visit encounter for concerns as above. A caregiver was present when appropriate. Due to this being a TeleHealth encounter ([facility]), evaluation of the following organ systems was limited: Vitals/Constitutional/EENT/Resp/CV/GI//MS/Neuro/Skin/Heme-Lymph-Imm. Pursuant to the emergency declaration under the [facility] waiver authority and the [employer] Act, this Virtual  Visit was conducted, with patient's (and/or legal guardian's) consent, to reduce the patient's risk of exposure to COVID-19 and provide necessary medical care.  Services were provided through a video synchronous discussion virtually to substitute for in-person clinic visit. Patient was located at their own home. I was at home while conducting this encounter. Consent:  She and/or her healthcare decision maker is aware that this patient-initiated Telehealth encounter is a billable service, with coverage as determined by her insurance carrier. She is aware that she may receive a bill and has provided verbal consent to proceed: Yes  This virtual visit was conducted via [employer]. Pursuant to the emergency declaration under the [facility], [facility] waiver authority and the [employer] Act, this Virtual  Visit was conducted to reduce the patient's risk of exposure to COVID-19 and provide continuity of care for an established patient. Services were provided through a video synchronous discussion virtually to substitute for in-person clinic visit. Due to this being a TeleHealth evaluation, many elements of the physical examination are unable to be assessed. Total Time: minutes: 11-20 minutes.

## 2020-05-21 ENCOUNTER — OFFICE VISIT (OUTPATIENT)
Dept: NEUROLOGY | Age: 45
End: 2020-05-21

## 2020-05-21 ENCOUNTER — PATIENT OUTREACH (OUTPATIENT)
Dept: INTERNAL MEDICINE CLINIC | Age: 45
End: 2020-05-21

## 2020-05-21 VITALS — WEIGHT: 220 LBS | HEIGHT: 65 IN | BODY MASS INDEX: 36.65 KG/M2

## 2020-05-21 DIAGNOSIS — G44.86 CERVICOGENIC HEADACHE: ICD-10-CM

## 2020-05-21 DIAGNOSIS — G93.2 IDIOPATHIC INTRACRANIAL HYPERTENSION: Primary | ICD-10-CM

## 2020-05-21 DIAGNOSIS — Z98.2 S/P VP SHUNT: ICD-10-CM

## 2020-05-21 DIAGNOSIS — G25.0 BENIGN ESSENTIAL TREMOR: ICD-10-CM

## 2020-05-21 DIAGNOSIS — M79.7 FIBROMYALGIA: ICD-10-CM

## 2020-05-21 RX ORDER — CYCLOBENZAPRINE HCL 5 MG
TABLET ORAL
Qty: 90 TAB | Refills: 1 | Status: SHIPPED | OUTPATIENT
Start: 2020-05-21 | End: 2020-06-01 | Stop reason: ALTCHOICE

## 2020-05-21 NOTE — PROGRESS NOTES
Yellow alert noted in remote symptom monitoring program. Messaged patient to notify Arianna Serna if symptoms have worsened since yesterday or if they would like to have a nurse reach out.

## 2020-05-21 NOTE — PATIENT INSTRUCTIONS
PRESCRIPTION REFILL POLICY [address] [place] [address] Statement to Patients April 1, 2014 In an effort to ensure the large volume of patient prescription refills is processed in the most efficient and expeditious manner, we are asking our patients to assist us by calling your Pharmacy for all prescription refills, this will include also your  Mail Order Pharmacy. The pharmacy will contact our office electronically to continue the refill process. Please do not wait until the last minute to call your pharmacy. We need at least 48 hours (2days) to fill prescriptions. We also encourage you to call your pharmacy before going to  your prescription to make sure it is ready. With regard to controlled substance prescription refill requests (narcotic refills) that need to be picked up at our office, we ask your cooperation by providing us with at least 72 hours (3days) notice that you will need a refill. We will not refill narcotic prescription refill requests after 4:00pm on any weekday, Monday through Thursday, or after 2:00pm on Fridays, or on the weekends. We encourage everyone to explore another way of getting your prescription refill request processed using [email or web address], our patient web portal through our electronic medical record system. [email or web address] is an efficient and effective way to communicate your medication request directly to the office and  downloadable as an puneet on your smart phone . [email or web address] also features a review functionality that allows you to view your medication list as well as leave messages for your physician. Are you ready to get connected? If so please review the attatched instructions or speak to any of our staff to get you set up right away! Thank you so much for your cooperation. Should you have any questions please contact our Practice Administrator. The Physicians and Staff,  [address] [place] [address]

## 2020-05-21 NOTE — PROGRESS NOTES
Chief Complaint   Patient presents with    Tremors     This is a telemedicine visit that was performed with the originating site at [facility] and the distant site at patient's home. Verbal consent to participate in video visit was obtained. The patient was identified by name and date of birth. This visit occurred during the Coronavirus [phone]) [facility]. I discussed with the patient the nature of our telemedicine visits, that:     I would evaluate the patient and recommend diagnostics and treatments based on my assessment   Our sessions are not being recorded and that personal health information is protected   Our team would provide follow up care in person if/when the patient needs it      [facility]  Cate Rubio is a 39 y.o. female was evaluated over a video call today. She is currently recovering from a COVID-19 infection. Has upper respiratory symptoms which seem to be slowly improving. She is currently at home in self quarantine. Over the past 2 or 3 weeks she has noticed that tremors in her hands have become more prominent. They mainly come on when she tries to do something. She has had mild tremor off and on for many years. Continues to do well from a headache and neck pain standpoint. Muscle relaxer/cyclobenzaprine have helped. RECAP  She has history of idiopathic intracranial hypertension and had a  shunt implantation in 2010 at [facility]. She was having a lot of headaches and shunt helped significantly. Also has a history of cluster headaches and migraines but those have not occurred in years. Has fibromyalgia for which she takes gabapentin and Cymbalta. Over the past 2 weeks or so she has had a headache which she describes as mainly originating in the suboccipital neck region. It feels as if there is a tightness/spasm and it changes into a headache going all the way to the forehead.   No associated photophobia, nausea or vomiting. No other focal motor or sensory deficits. She has it most part of the day but is able to sleep at night as she takes trazodone. It feels different than her migraines or headaches related to pseudotumor. Current Outpatient Medications   Medication Sig    azithromycin (ZITHROMAX) 250 mg tablet Take 2 tablets today, then take 1 tablet daily    benzonatate (TESSALON) 200 mg capsule Take 1 Cap by mouth three (3) times daily as needed for Cough for up to 7 days.  venlafaxine-SR (EFFEXOR-XR) 75 mg capsule Take 1 Cap by mouth daily.  butalbital-acetaminophen-caffeine (FIORICET, ESGIC) -40 mg per tablet Take 1 tab every 12 hours as needed for migraine headaches    albuterol (PROVENTIL HFA, VENTOLIN HFA, PROAIR HFA) 90 mcg/actuation inhaler Take 2 Puffs by inhalation every six (6) hours as needed for Wheezing or Cough.  loperamide (IMMODIUM) 2 mg tablet Take 4 mg PO at onset of diarrhea, then take 2 mg PO after each loose stool up to 4 times daily (maximum: 16 mg/day)    ondansetron (ZOFRAN ODT) 4 mg disintegrating tablet Take 1 Tab by mouth every eight (8) hours as needed for Nausea.  Ca carb-Ca gluc-Mg ox-Mg gluco (Calcium Magnesium) 500 mg calcium -250 mg tab Take 1 tab po daily    gabapentin (NEURONTIN) 600 mg tablet Take 2 Tabs by mouth three (3) times daily. Max Daily Amount: 3,600 mg.  cyclobenzaprine (FLEXERIL) 5 mg tablet 1 in the AM and 2 at night    lisinopriL (PRINIVIL, ZESTRIL) 10 mg tablet Take 1 Tab by mouth daily.  glucose blood VI test strips (ReliOn Prime Test Strips) strip Check BG BID    DULoxetine (Cymbalta) 60 mg capsule Take 1 Cap by mouth daily.  hydroCHLOROthiazide (HYDRODIURIL) 25 mg tablet Take 1 Tab by mouth daily.  metFORMIN (GLUCOPHAGE) 500 mg tablet Take 2 Tabs by mouth two (2) times daily (with meals).  omeprazole (PRILOSEC) 40 mg capsule Take 1 Cap by mouth daily.  traZODone (DESYREL) 50 mg tablet Take 3 Tabs by mouth nightly. Indications: insomnia associated with depression     No current facility-administered medications for this visit. PHYSICAL EXAMINATION:    Visit Vitals  Ht 5' 5\" (1.651 m)   Wt 99.8 kg (220 lb)   BMI 36.61 kg/m²     Due to this being a TeleHealth evaluation, many elements of the physical examination are unable to be assessed. Exam:  NEUROLOGICAL EXAM:  General: Awake, alert, oriented x 3  CN: EOMI, facial strength normal and symmetric, hearing is intact  Motor: AG x 4  Reflexes: deferred  Coordination: No ataxia. Sensation: LT intact throughout  Gait: Steady      ASSESSMENT    ICD-10-CM ICD-9-CM    1. Idiopathic intracranial hypertension G93.2 348.2    2. S/P  shunt Z98.2 V45.2    3. Fibromyalgia M79.7 729.1    4. Benign essential tremor G25.0 333.1    5. Chronic migraine G43.709 346.70        DISCUSSION  Ms. Cate Rubio has a remote history of idiopathic intracranial hypertension and is status post ventriculoperitoneal shunt. Headache has improved with muscle relaxer. 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 Refills were provided for cyclobenzaprine  We may consider neck imaging and possibly repeat brain imaging in future depending upon the clinical course    She also has benign tremor affecting both hands and it seems to have been exacerbated due to the underlying illness/COVID-19 infection  We will continue to watch this as she recovers  No additional pharmacologic therapy for now    Kriss Pittman MD  Diplomate, American Board of Psychiatry & Neurology (Neurology)  Diplomate, [address] [place] [address] Rd., [address] of Psychiatry & Neurology (Clinical Neurophysiology)  Diplomate, American Board of Electrodiagnostic Medicine  This note will not be viewable in [address]

## 2020-05-21 NOTE — PROGRESS NOTES
Ms. Vero Cross is following up tremor. Her hands and head are affected. Depression screening done on patient.

## 2020-05-23 ENCOUNTER — PATIENT OUTREACH (OUTPATIENT)
Dept: INTERNAL MEDICINE CLINIC | Age: 45
End: 2020-05-23

## 2020-05-23 NOTE — PROGRESS NOTES
Yellow alert noted in remote symptom monitoring program. Messaged patient to notify Leah France if symptoms have worsened since yesterday or if they would like to have a nurse reach out.

## 2020-05-28 ENCOUNTER — PATIENT MESSAGE (OUTPATIENT)
Dept: FAMILY MEDICINE CLINIC | Age: 45
End: 2020-05-28

## 2020-05-28 DIAGNOSIS — K21.9 GASTROESOPHAGEAL REFLUX DISEASE WITHOUT ESOPHAGITIS: Primary | ICD-10-CM

## 2020-05-29 RX ORDER — FAMOTIDINE 10 MG/1
10 TABLET ORAL 2 TIMES DAILY
Qty: 60 TAB | Refills: 1 | Status: SHIPPED | OUTPATIENT
Start: 2020-05-29 | End: 2020-07-21

## 2020-05-29 NOTE — TELEPHONE ENCOUNTER
From: Nelsy Gandaraelor  To: Robinson Diana NP  Sent: 5/28/2020 7:08 PM EDT  Subject: Non-Urgent Medical Question    I am having more issues with my reflux it is constantly burning. .. is there anyway I can get a coctail prescription or something stronger? It hurts to eat and is a constant burn. Franck Rashid

## 2020-05-31 DIAGNOSIS — M79.7 FIBROMYALGIA: ICD-10-CM

## 2020-05-31 RX ORDER — GABAPENTIN 600 MG/1
1200 TABLET ORAL 3 TIMES DAILY
Qty: 180 TAB | Refills: 0 | Status: CANCELLED | OUTPATIENT
Start: 2020-05-31

## 2020-06-01 DIAGNOSIS — G93.2 IDIOPATHIC INTRACRANIAL HYPERTENSION: Primary | ICD-10-CM

## 2020-06-01 DIAGNOSIS — R51.9 DAILY HEADACHE: ICD-10-CM

## 2020-06-01 RX ORDER — TOPIRAMATE 50 MG/1
50 TABLET, FILM COATED ORAL 2 TIMES DAILY
Qty: 60 TAB | Refills: 0 | Status: SHIPPED | OUTPATIENT
Start: 2020-06-01 | End: 2020-06-22

## 2020-06-01 RX ORDER — TRAMADOL HYDROCHLORIDE 50 MG/1
50 TABLET ORAL
Qty: 30 TAB | Refills: 0 | Status: SHIPPED | OUTPATIENT
Start: 2020-06-01 | End: 2020-06-04

## 2020-06-02 ENCOUNTER — TELEPHONE (OUTPATIENT)
Dept: NEUROLOGY | Age: 45
End: 2020-06-02

## 2020-06-02 NOTE — TELEPHONE ENCOUNTER
Re: Tramadol denied     Denial rec'd from [facility]     Per plan:   Patient must have a UDS as well as  report reviewed. Please advise.

## 2020-06-03 NOTE — TELEPHONE ENCOUNTER
Chief Complaint   Patient presents with    Medication Refill     duplicate encounter for gabapentin

## 2020-06-05 ENCOUNTER — VIRTUAL VISIT (OUTPATIENT)
Dept: FAMILY MEDICINE CLINIC | Age: 45
End: 2020-06-05

## 2020-06-05 VITALS — DIASTOLIC BLOOD PRESSURE: 78 MMHG | SYSTOLIC BLOOD PRESSURE: 132 MMHG

## 2020-06-05 DIAGNOSIS — E11.42 CONTROLLED TYPE 2 DIABETES MELLITUS WITH DIABETIC POLYNEUROPATHY, WITHOUT LONG-TERM CURRENT USE OF INSULIN (HCC): Primary | ICD-10-CM

## 2020-06-05 DIAGNOSIS — R19.7 DIARRHEA, UNSPECIFIED TYPE: ICD-10-CM

## 2020-06-05 DIAGNOSIS — G43.001 MIGRAINE WITHOUT AURA AND WITH STATUS MIGRAINOSUS, NOT INTRACTABLE: ICD-10-CM

## 2020-06-05 DIAGNOSIS — K62.5 RECTAL BLEEDING: ICD-10-CM

## 2020-06-05 DIAGNOSIS — I10 ESSENTIAL HYPERTENSION: ICD-10-CM

## 2020-06-05 DIAGNOSIS — M79.7 FIBROMYALGIA: ICD-10-CM

## 2020-06-05 RX ORDER — LOPERAMIDE HYDROCHLORIDE 2 MG/1
CAPSULE ORAL
COMMUNITY
Start: 2020-05-08 | End: 2020-07-21

## 2020-06-05 NOTE — PROGRESS NOTES
[address] Note  Subjective:      Carlos Rae is a 39 y.o. female who presents for follow-up. Chief Complaint   Patient presents with    Medication Refill     I was in the office while conducting this encounter. Consent:  She and/or her healthcare decision maker is aware that this patient-initiated Telehealth encounter is a billable service, with coverage as determined by her insurance carrier. She is aware that she may receive a bill and has provided verbal consent to proceed: Yes    This virtual visit was conducted via [address] Pursuant to the emergency declaration under the [facility], [facility] waiver authority and the [employer] and [employer] Act, this Virtual  Visit was conducted to reduce the patient's risk of exposure to COVID-19 and provide continuity of care for an established patient. Services were provided through a video synchronous discussion virtually to substitute for in-person clinic visit. Due to this being a TeleHealth evaluation, many elements of the physical examination are unable to be assessed. Total Time: minutes: 11-20 minutes. Diagnosed with COVID19 on 5/15. Symptoms started about 10 days earlier with diarrhea, progressing into cough and fever. Still having diarrhea. Does not feel like she is digesting food well, worse with certain foods. 10 minutes after eating having to use bathroom. Mid abdominal pain. There is also rectal bleeding. Chronic Reflux. Virtual GI visit yesterday. Concerns for possible Irritable bowel verse inflammatory bowel. Scheduled for upper and lower endoscopy 7/28/2020. He did order labs and stool studies. On PPI. Carlos Rae is a 39 y.o. female who presents for Type 2 diabetes mellitus. Diagnosed 1 year ago. Last A1c was 6.5% 4/20. Current symptoms/problems include none.      Known diabetic complications: peripheral neuropathy. Cardiovascular risk factors: smoking/ tobacco exposure, dyslipidemia, diabetes mellitus, obesity, hypertension  Current diabetic medications include: Metformin 500 mg BID.      Eye exam current (within one year): no  Weight trend: stable  Prior visit with dietician: no  Current diet: tries to limit sweets. Current exercise: ADL's  Performing nightly foot checks: Yes, has a callus on the left foot.       Any episodes of hypoglycemia? no  Is She on ACE inhibitor or angiotensin II receptor blocker?: Yes      Headaches  History of intercranial HTN s/p  shunt. Reports chronic headaches are increasing in frequency and intensity. Described as pressure in the base of neck that radiates to head. Intensity is moderate to severe. Occurring a few times per week. Associated nausea. Current treatment includes tyelnol as needed. Neurologist here in [place]. Started on effexor and topimate which has been helpful.      History of anxiety, insomnia, fibromyalgia. Denies depressed mood. Anxiety fluctuates but is manageable currently. On Cymbalta 60 mg daily, Gabapentin 600 mg TID, Trazodone 100 mg nightly. Pain is manageable. Able to due ADL's. Chronic pain is mostly of back. Still having trouble sleeping despite taking Trazodone, also taking benadryl nightly as needed. Cardiovascular Review:  The patient has hypertension, hyperlipidemia and obesity. Diet and Lifestyle: not attempting to follow a low fat, low cholesterol diet, does not rigorously follow a diabetic diet, sedentary, nonsmoker  Home BP Monitoring: is well controlled at home  Pertinent ROS: taking medications as instructed, no medication side effects noted, no TIA's, no chest pain on exertion, no dyspnea on exertion, no swelling of ankles, no palpitations. Current Outpatient Medications   Medication Sig Dispense Refill    gabapentin (NEURONTIN) 600 mg tablet Take 2 Tabs by mouth three (3) times daily.  Max Daily Amount: 3,600 mg. 180 Tab 1    topiramate (TOPAMAX) 50 mg tablet Take 1 Tab by mouth two (2) times a day. 60 Tab 0    venlafaxine-SR (EFFEXOR-XR) 75 mg capsule Take 1 Cap by mouth daily. 30 Cap 1    butalbital-acetaminophen-caffeine (FIORICET, ESGIC) -40 mg per tablet Take 1 tab every 12 hours as needed for migraine headaches 30 Tab 0    albuterol (PROVENTIL HFA, VENTOLIN HFA, PROAIR HFA) 90 mcg/actuation inhaler Take 2 Puffs by inhalation every six (6) hours as needed for Wheezing or Cough. 1 Inhaler 0    glucose blood VI test strips (ReliOn Prime Test Strips) strip Check BG  Strip 5    DULoxetine (Cymbalta) 60 mg capsule Take 1 Cap by mouth daily. 90 Cap 0    hydroCHLOROthiazide (HYDRODIURIL) 25 mg tablet Take 1 Tab by mouth daily. 90 Tab 0    metFORMIN (GLUCOPHAGE) 500 mg tablet Take 2 Tabs by mouth two (2) times daily (with meals). 360 Tab 0    omeprazole (PRILOSEC) 40 mg capsule Take 1 Cap by mouth daily. 90 Cap 0    traZODone (DESYREL) 50 mg tablet Take 3 Tabs by mouth nightly. Indications: insomnia associated with depression 270 Tab 0    lisinopriL (PRINIVIL, ZESTRIL) 10 mg tablet Take 1 Tab by mouth daily. 60 Tab 0    loperamide (IMODIUM) 2 mg capsule       famotidine (PEPCID) 10 mg tablet Take 1 Tab by mouth two (2) times a day. 60 Tab 1    ondansetron (ZOFRAN ODT) 4 mg disintegrating tablet Take 1 Tab by mouth every eight (8) hours as needed for Nausea. 12 Tab 0     Allergies   Allergen Reactions    Iodinated Contrast Media Hives    Iodine And Iodide Containing Products Hives    Morphine Hives    Naprosyn [Naproxen] Hives       ROS:   Complete review of systems was reviewed with pertinent information listed in HPI. Review of Systems   Constitutional: Negative for chills, diaphoresis, fever, malaise/fatigue and weight loss. HENT: Negative for congestion, ear pain, hearing loss, sinus pain, sore throat and tinnitus. Eyes: Negative for blurred vision and double vision.    Respiratory: Negative for cough and shortness of breath. Cardiovascular: Negative for chest pain, palpitations and leg swelling. Gastrointestinal: Positive for abdominal pain and diarrhea. Negative for blood in stool, constipation, heartburn, melena, nausea and vomiting. Genitourinary: Negative for dysuria, frequency, hematuria and urgency. Musculoskeletal: Negative for joint pain and myalgias. Skin: Negative for itching and rash. Neurological: Positive for headaches. Negative for dizziness, tingling and weakness. Endo/Heme/Allergies: Negative for polydipsia. Psychiatric/Behavioral: Negative for depression, hallucinations, substance abuse and suicidal ideas. The patient is not nervous/anxious. Objective:     Visit Vitals  /78   LMP  (LMP Unknown)       Vitals and Nurse Documentation reviewed. Physical Exam  Constitutional:       General: She is not in acute distress. Appearance: Normal appearance. She is well-developed, well-groomed and normal weight. She is not ill-appearing, toxic-appearing or diaphoretic. HENT:      Head: Normocephalic and atraumatic. Right Ear: Hearing normal.      Left Ear: Hearing normal.      Nose: No nasal deformity. Mouth/Throat:      Lips: Pink. No lesions. Mouth: Mucous membranes are moist.   Eyes:      General: Lids are normal.      Conjunctiva/sclera: Conjunctivae normal.   Pulmonary:      Effort: Pulmonary effort is normal.   Neurological:      Mental Status: She is alert and oriented to person, place, and time. Gait: Gait is intact. Psychiatric:         Attention and Perception: Attention normal.         Mood and Affect: Mood normal.         Speech: Speech normal.         Behavior: Behavior normal. Behavior is cooperative. Thought Content: Thought content normal.         Cognition and Memory: Cognition normal.         Judgment: Judgment normal.         Assessment/Plan:     Diagnoses and all orders for this visit:    1.  Controlled type 2 diabetes mellitus with diabetic polyneuropathy, without long-term current use of insulin ([facility] [address]): Stable, A1c at goal. Continue current therapy. 2. Essential hypertension: Stable, continue current therapy. 3. Fibromyalgia: On Cymbalta, Effexor added by neurology for migraines - tolerating therapy and feeling better. Continue for now, monitor closely for adverse effects. 4. Migraine without aura and with status migrainosus, not intractable: Managed by specialist, improved on low dose Effexor and topiramate. 5. Diarrhea, unspecified type: Evaluated by GI yesterday. Labs and stool studies pending. ENG/COlonoscopy pending. 6. Rectal bleeding: Evaluated by GI yesterday. Labs and stool studies pending. ENG/COlonoscopy pending. Follow-up and Dispositions    · Return in about 3 months (around 9/5/2020) for Follow-up.        Discussed expected course/resolution/complications of diagnosis in detail with patient.    Medication risks/benefits/costs/interactions/alternatives discussed with patient.    Pt expressed understanding with the diagnosis and plan

## 2020-06-08 DIAGNOSIS — R20.0 NUMBNESS: Primary | ICD-10-CM

## 2020-06-10 DIAGNOSIS — I10 ESSENTIAL HYPERTENSION: ICD-10-CM

## 2020-06-11 RX ORDER — LISINOPRIL 10 MG/1
10 TABLET ORAL DAILY
Qty: 60 TAB | Refills: 0 | Status: SHIPPED | OUTPATIENT
Start: 2020-06-11 | End: 2020-08-12 | Stop reason: SDUPTHER

## 2020-06-12 ENCOUNTER — TELEPHONE (OUTPATIENT)
Dept: FAMILY MEDICINE CLINIC | Age: 45
End: 2020-06-12

## 2020-06-12 NOTE — TELEPHONE ENCOUNTER
Chief Complaint   Patient presents with    Prior Auth     [facility] Strips     Prior Auth     [facility] Strips:  DENIED. PREFERRED PRODUCT IS ONETOUCH GLUCOMETER AND STRIPS, QUANTITY LIMIT /30.

## 2020-06-16 DIAGNOSIS — E11.9 DIABETES MELLITUS TYPE 2, NONINSULIN DEPENDENT (HCC): Primary | ICD-10-CM

## 2020-06-16 RX ORDER — BLOOD-GLUCOSE METER
EACH MISCELLANEOUS
Qty: 1 EACH | Refills: 0 | Status: SHIPPED | OUTPATIENT
Start: 2020-06-16 | End: 2022-05-16 | Stop reason: SDUPTHER

## 2020-06-16 RX ORDER — BLOOD SUGAR DIAGNOSTIC
STRIP MISCELLANEOUS
Qty: 100 STRIP | Refills: 1 | Status: SHIPPED | OUTPATIENT
Start: 2020-06-16 | End: 2022-05-16 | Stop reason: SDUPTHER

## 2020-06-16 NOTE — TELEPHONE ENCOUNTER
Refill request forwarded to [facility] RYAN MCDANIELS for one touch glucose meter and test strips as the preferred for insurance.   Nick Diego LPN

## 2020-06-22 DIAGNOSIS — R51.9 DAILY HEADACHE: ICD-10-CM

## 2020-06-22 DIAGNOSIS — G93.2 IDIOPATHIC INTRACRANIAL HYPERTENSION: ICD-10-CM

## 2020-06-22 RX ORDER — TOPIRAMATE 100 MG/1
100 TABLET, FILM COATED ORAL 2 TIMES DAILY
Qty: 60 TAB | Refills: 3 | Status: SHIPPED | OUTPATIENT
Start: 2020-06-22 | End: 2020-12-17

## 2020-06-24 ENCOUNTER — PATIENT MESSAGE (OUTPATIENT)
Dept: FAMILY MEDICINE CLINIC | Age: 45
End: 2020-06-24

## 2020-06-24 DIAGNOSIS — R31.0 GROSS HEMATURIA: Primary | ICD-10-CM

## 2020-06-24 NOTE — TELEPHONE ENCOUNTER
From: Catracho Carrington  To: Martita Antunez NP  Sent: 6/24/2020 12:22 PM EDT  Subject: Non-Urgent Medical Question    I have an issue, and I currently have no way to come in. But I have been urinating blood for the past 2 days. I do have chronic kidney stones but I am having no pain at the moment. Well not any pain that I can't handle being used to being in pain every day anyway. So if you could give me some thought on this. I will be in to see my neuro tomorrow morning. At 8 am...

## 2020-07-14 ENCOUNTER — TELEPHONE (OUTPATIENT)
Dept: NEUROLOGY | Age: 45
End: 2020-07-14

## 2020-07-14 DIAGNOSIS — Z98.2 S/P VP SHUNT: Primary | ICD-10-CM

## 2020-07-14 DIAGNOSIS — R51.9 NONINTRACTABLE HEADACHE, UNSPECIFIED CHRONICITY PATTERN, UNSPECIFIED HEADACHE TYPE: ICD-10-CM

## 2020-07-14 NOTE — TELEPHONE ENCOUNTER
Pt calling in regards to Mri, stated that someone will need to readjust her shunt after Mri. Please call.

## 2020-07-15 ENCOUNTER — TELEPHONE (OUTPATIENT)
Dept: NEUROLOGY | Age: 45
End: 2020-07-15

## 2020-07-15 DIAGNOSIS — I10 ESSENTIAL HYPERTENSION: ICD-10-CM

## 2020-07-15 DIAGNOSIS — Z98.2 S/P VP SHUNT: Primary | ICD-10-CM

## 2020-07-15 DIAGNOSIS — E11.42 CONTROLLED TYPE 2 DIABETES MELLITUS WITH DIABETIC POLYNEUROPATHY, WITHOUT LONG-TERM CURRENT USE OF INSULIN (HCC): ICD-10-CM

## 2020-07-15 NOTE — TELEPHONE ENCOUNTER
I advised patient to contact her insurance company to find a provider in network. Patient agreed to this plan.

## 2020-07-15 NOTE — TELEPHONE ENCOUNTER
Per Dr. Hartley Even, Neurosurgery does this. Please call Dr. Naun Delatorre or Dr. Quan Ya office to get direction re: this.    I will do a referral.

## 2020-07-15 NOTE — TELEPHONE ENCOUNTER
I gave patient Dr. Darlene Strauss's contact information. I have faxed referral and notes to Dr. Rosie Riojas and received confirmation.

## 2020-07-15 NOTE — TELEPHONE ENCOUNTER
Per Dr. Gisele Wei, Thank you. Zaida Lamar will need to schedule an appointment with neurosurgery for shunt reprogramming after MRI

## 2020-07-15 NOTE — TELEPHONE ENCOUNTER
Dr. Mariela Leggett office does not participate with patient's insurance. I advised patient to contact her insurance company to find a participating provider. She agreed to this plan.

## 2020-07-15 NOTE — TELEPHONE ENCOUNTER
Last visit 06/05/2020 Virtual visit NP Frankie Pascal   Next appointment 3 months (09/2020)   Previous refill encounter(s) 04/21/2020 HCTZ #[id number]     Requested Prescriptions     Pending Prescriptions Disp Refills    hydroCHLOROthiazide (HYDRODIURIL) 25 mg tablet 90 Tab 1     Sig: Take 1 Tab by mouth daily.

## 2020-07-15 NOTE — TELEPHONE ENCOUNTER
Pt calling to let Dr Manpreet Begum know that Dr Faustino Huang does not take pt's insurance [employer]. Is there another neuro surgeon that does take pt's insurance? Please call.

## 2020-07-17 RX ORDER — METFORMIN HYDROCHLORIDE 500 MG/1
1000 TABLET ORAL 2 TIMES DAILY WITH MEALS
Qty: 360 TAB | Refills: 1 | Status: SHIPPED | OUTPATIENT
Start: 2020-07-17 | End: 2020-10-13

## 2020-07-17 RX ORDER — HYDROCHLOROTHIAZIDE 25 MG/1
25 TABLET ORAL DAILY
Qty: 90 TAB | Refills: 1 | Status: SHIPPED | OUTPATIENT
Start: 2020-07-17 | End: 2020-07-21

## 2020-07-20 ENCOUNTER — TELEPHONE (OUTPATIENT)
Dept: NEUROLOGY | Age: 45
End: 2020-07-20

## 2020-07-20 ENCOUNTER — TELEPHONE (OUTPATIENT)
Dept: FAMILY MEDICINE CLINIC | Age: 45
End: 2020-07-20

## 2020-07-20 NOTE — TELEPHONE ENCOUNTER
Dena calling requesting information re pt shunt.      Please fax to both fax numbers [phone] and [phone]

## 2020-07-20 NOTE — TELEPHONE ENCOUNTER
----- Message from Sobia Schmidt sent at 7/20/2020  2:13 PM EDT -----  Regarding: NP Esther Reyes/telephone  Patient would like the NP to contact her about being tested for COVID-19 prior to seeing her gastroenterologist. Gilford Quan contact number is [phone]

## 2020-07-20 NOTE — TELEPHONE ENCOUNTER
Called, spoke to pt. Two pt identifiers confirmed. Patient schedule VV on 7/21/2020 d/t Anxiety with GI appointment.

## 2020-07-21 ENCOUNTER — VIRTUAL VISIT (OUTPATIENT)
Dept: FAMILY MEDICINE CLINIC | Age: 45
End: 2020-07-21

## 2020-07-21 DIAGNOSIS — F41.1 GAD (GENERALIZED ANXIETY DISORDER): Primary | ICD-10-CM

## 2020-07-21 DIAGNOSIS — R11.0 NAUSEA: ICD-10-CM

## 2020-07-21 DIAGNOSIS — G44.86 CERVICOGENIC HEADACHE: ICD-10-CM

## 2020-07-21 RX ORDER — DIMENHYDRINATE 50 MG
100 TABLET ORAL
COMMUNITY
End: 2020-07-31 | Stop reason: ALTCHOICE

## 2020-07-21 RX ORDER — DIPHENHYDRAMINE HCL 25 MG
75 TABLET ORAL
COMMUNITY

## 2020-07-21 RX ORDER — HYDROCHLOROTHIAZIDE 25 MG/1
25 TABLET ORAL EVERY EVENING
COMMUNITY
End: 2020-10-20 | Stop reason: SDUPTHER

## 2020-07-21 RX ORDER — POLYETHYLENE GLYCOL-3350 AND ELECTROLYTES 236; 6.74; 5.86; 2.97; 22.74 G/274.31G; G/274.31G; G/274.31G; G/274.31G; G/274.31G
POWDER, FOR SOLUTION ORAL
COMMUNITY
Start: 2020-06-05 | End: 2020-07-31

## 2020-07-21 RX ORDER — CYCLOBENZAPRINE HCL 5 MG
5 TABLET ORAL 2 TIMES DAILY
COMMUNITY
End: 2020-08-13

## 2020-07-21 RX ORDER — BUSPIRONE HYDROCHLORIDE 7.5 MG/1
7.5 TABLET ORAL 3 TIMES DAILY
Qty: 90 TAB | Refills: 1 | Status: SHIPPED | OUTPATIENT
Start: 2020-07-21 | End: 2020-09-25 | Stop reason: SDUPTHER

## 2020-07-21 RX ORDER — CYCLOBENZAPRINE HCL 5 MG
TABLET ORAL
Qty: 90 TAB | Refills: 0 | Status: SHIPPED | OUTPATIENT
Start: 2020-07-21 | End: 2020-07-21

## 2020-07-21 RX ORDER — CYCLOBENZAPRINE HCL 5 MG
10 TABLET ORAL EVERY EVENING
COMMUNITY
End: 2020-07-31 | Stop reason: SDUPTHER

## 2020-07-21 RX ORDER — ONDANSETRON 4 MG/1
4 TABLET, ORALLY DISINTEGRATING ORAL
Qty: 12 TAB | Refills: 0 | Status: SHIPPED | OUTPATIENT
Start: 2020-07-21 | End: 2020-12-29 | Stop reason: SDUPTHER

## 2020-07-21 NOTE — PROGRESS NOTES
[facility] Note  Subjective:      Shahzad Traore is a 39 y.o. female who presents for an acute visit with the following chief complaints. No chief complaint on file. I was in the office while conducting this encounter. Consent:  She and/or her healthcare decision maker is aware that this patient-initiated Telehealth encounter is a billable service, with coverage as determined by her insurance carrier. She is aware that she may receive a bill and has provided verbal consent to proceed: Yes    This virtual visit was conducted via Doxy. me. Pursuant to the emergency declaration under the [facility], 1135 waiver authority and the [employer] and [employer] Act, this Virtual  Visit was conducted to reduce the patient's risk of exposure to COVID-19 and provide continuity of care for an established patient. Services were provided through a video synchronous discussion virtually to substitute for in-person clinic visit. Due to this being a TeleHealth evaluation, many elements of the physical examination are unable to be assessed. Total Time: minutes: 11-20 minutes. Needs testing for COVID19 prior to appointment to colonoscopy and EGD. Having colonscopy at [facility]. Advised she needs to have testing done at [facility].     Had appointment with urology, passed kidney stones on her own. Having biospy of bladder on 8/20th. Complains of anxiety. Longstanding issue, acutely worsening over the past few weeks due to medical concerns and upcoming procedures. Symptoms include daily worry, trouble focusing, crying often. Endorses 1-2 panic attacks in the past 2 weeks. Current treatment: Cymbalta 60 mg daily, she was recently started on low dose Effexor by neurologist for migraines. Not in counseling.        Current Outpatient Medications   Medication Sig Dispense Refill    busPIRone (BUSPAR) 7.5 mg tablet Take 1 Tab by mouth three (3) times daily. 90 Tab 1    ondansetron (ZOFRAN ODT) 4 mg disintegrating tablet Take 1 Tab by mouth every eight (8) hours as needed for Nausea. 12 Tab 0    metFORMIN (GLUCOPHAGE) 500 mg tablet Take 2 Tabs by mouth two (2) times daily (with meals). 360 Tab 1    venlafaxine-SR (EFFEXOR-XR) 75 mg capsule TAKE ONE CAPSULE BY MOUTH DAILY 30 Cap 2    topiramate (TOPAMAX) 100 mg tablet Take 1 Tab by mouth two (2) times a day. 60 Tab 3    Blood-Glucose Meter (OneTouch Verio Meter) misc CHECK BLOOD GLUCOSE TWICE A DAY 1 Each 0    glucose blood VI test strips (OneTouch Verio test strips) strip CHECK BLOOD GLUCOSE TWICE A  Strip 1    lisinopriL (PRINIVIL, ZESTRIL) 10 mg tablet Take 1 Tab by mouth daily. 60 Tab 0    gabapentin (NEURONTIN) 600 mg tablet Take 2 Tabs by mouth three (3) times daily. Max Daily Amount: 3,600 mg. 180 Tab 1    butalbital-acetaminophen-caffeine (FIORICET, ESGIC) -40 mg per tablet Take 1 tab every 12 hours as needed for migraine headaches 30 Tab 0    albuterol (PROVENTIL HFA, VENTOLIN HFA, PROAIR HFA) 90 mcg/actuation inhaler Take 2 Puffs by inhalation every six (6) hours as needed for Wheezing or Cough. 1 Inhaler 0    glucose blood VI test strips (ReliOn Prime Test Strips) strip Check BG  Strip 5    DULoxetine (Cymbalta) 60 mg capsule Take 1 Cap by mouth daily. 90 Cap 0    omeprazole (PRILOSEC) 40 mg capsule Take 1 Cap by mouth daily. 90 Cap 0    traZODone (DESYREL) 50 mg tablet Take 3 Tabs by mouth nightly. Indications: insomnia associated with depression 270 Tab 0    GaviLyte-G 236-22.74-6.74 -5.86 gram suspension       hydroCHLOROthiazide (HYDRODIURIL) 25 mg tablet Take 25 mg by mouth every evening.  dimenhyDRINATE (DRAMAMINE) 50 mg tablet Take 100 mg by mouth nightly.  diphenhydrAMINE (Benadryl Allergy) 25 mg tablet Take 75 mg by mouth nightly.       cyclobenzaprine (FLEXERIL) 5 mg tablet Take 5 mg by mouth every morning.  cyclobenzaprine (FLEXERIL) 5 mg tablet Take 10 mg by mouth every evening. Allergies   Allergen Reactions    Iodinated Contrast Media Hives    Iodine And Iodide Containing Products Hives    Morphine Hives    Naprosyn [Naproxen] Hives    Nsaids (Non-Steroidal Anti-Inflammatory Drug) Swelling     Swelling all over.  Other Plant, Animal, Environmental Other (comments)     A medication given for vertigo caused headaches. Unsure of name. ROS:   Complete review of systems was reviewed with pertinent information listed in HPI. Review of Systems   Constitutional: Negative for chills, diaphoresis, fever, malaise/fatigue and weight loss. HENT: Negative for congestion and sore throat. Eyes: Negative for blurred vision. Respiratory: Negative for cough and shortness of breath. Cardiovascular: Negative for chest pain, palpitations and leg swelling. Gastrointestinal: Positive for nausea. Negative for abdominal pain, heartburn and vomiting. Needs refill for zofran for occasional nausea. Genitourinary: Negative for dysuria, flank pain, frequency, hematuria and urgency. Musculoskeletal: Negative for joint pain and myalgias. Skin: Negative. Neurological: Negative for dizziness, tingling and headaches. Endo/Heme/Allergies: Negative for polydipsia. Psychiatric/Behavioral: Negative for depression, hallucinations, memory loss, substance abuse and suicidal ideas. The patient is nervous/anxious. The patient does not have insomnia. Objective:     Visit Vitals  LMP  (LMP Unknown)       Vitals and Nurse Documentation reviewed. Physical Exam  Constitutional:       General: She is not in acute distress. Appearance: Normal appearance. She is well-developed, well-groomed and normal weight. She is not ill-appearing, toxic-appearing or diaphoretic. HENT:      Head: Normocephalic and atraumatic.       Right Ear: Hearing normal.      Left Ear: Hearing normal. Nose: No nasal deformity. Mouth/Throat:      Lips: Pink. No lesions. Mouth: Mucous membranes are moist.   Eyes:      General: Lids are normal.      Conjunctiva/sclera: Conjunctivae normal.   Pulmonary:      Effort: Pulmonary effort is normal.   Neurological:      Mental Status: She is alert and oriented to person, place, and time. Gait: Gait is intact. Psychiatric:         Attention and Perception: Attention normal.         Mood and Affect: Mood normal.         Speech: Speech normal.         Behavior: Behavior normal. Behavior is cooperative. Thought Content: Thought content normal.         Cognition and Memory: Cognition normal.         Judgment: Judgment normal.         Assessment/Plan:     Diagnoses and all orders for this visit:    1. DEANN (generalized anxiety disorder): Longstanding issue, acutely worsening. No SI/HI. Continue SNRI, start buspar. Advised CBT. Lifestyle modifications and self-care reviewed. 4 week follow-up. -     busPIRone (BUSPAR) 7.5 mg tablet; Take 1 Tab by mouth three (3) times daily.  -     REFERRAL TO BEHAVIORAL HEALTH    2. Nausea: Follow-up with GI.   -     ondansetron (ZOFRAN ODT) 4 mg disintegrating tablet; Take 1 Tab by mouth every eight (8) hours as needed for Nausea. Follow-up and Dispositions    · Return in about 4 weeks (around 8/18/2020) for Follow-up, Anxiety.        Discussed expected course/resolution/complications of diagnosis in detail with patient.    Medication risks/benefits/costs/interactions/alternatives discussed with patient.    Pt was given an after visit summary which includes diagnoses, current medications & vitals.    Pt expressed understanding with the diagnosis and plan

## 2020-07-22 DIAGNOSIS — K21.9 GASTROESOPHAGEAL REFLUX DISEASE WITHOUT ESOPHAGITIS: ICD-10-CM

## 2020-07-22 NOTE — TELEPHONE ENCOUNTER
Last Visit: VV 7/21/20 NP Ilsa Hampton  Next Appointment: Not scheduled  Previous Refill Encounter(s): 4/21/20  #90    Requested Prescriptions     Pending Prescriptions Disp Refills    omeprazole (PRILOSEC) 40 mg capsule 90 Cap 0     Sig: Take 1 Cap by mouth daily.

## 2020-07-23 RX ORDER — OMEPRAZOLE 40 MG/1
40 CAPSULE, DELAYED RELEASE ORAL DAILY
Qty: 90 CAP | Refills: 0 | Status: SHIPPED | OUTPATIENT
Start: 2020-07-23 | End: 2021-02-11 | Stop reason: SDUPTHER

## 2020-07-24 ENCOUNTER — HOSPITAL ENCOUNTER (OUTPATIENT)
Dept: PREADMISSION TESTING | Age: 45
Discharge: HOME | End: 2020-07-24
Payer: MEDICAID

## 2020-07-24 DIAGNOSIS — U07.1 COVID-19: ICD-10-CM

## 2020-07-24 PROCEDURE — 87635 SARS-COV-2 COVID-19 AMP PRB: CPT

## 2020-07-25 LAB — SARS-COV-2, COV2NT: NOT DETECTED

## 2020-07-28 ENCOUNTER — ANESTHESIA (OUTPATIENT)
Dept: ENDOSCOPY | Age: 45
End: 2020-07-28
Payer: MEDICAID

## 2020-07-28 ENCOUNTER — HOSPITAL ENCOUNTER (OUTPATIENT)
Age: 45
Setting detail: OUTPATIENT SURGERY
Discharge: HOME | End: 2020-07-28
Attending: INTERNAL MEDICINE | Admitting: INTERNAL MEDICINE
Payer: MEDICAID

## 2020-07-28 ENCOUNTER — ANESTHESIA EVENT (OUTPATIENT)
Dept: ENDOSCOPY | Age: 45
End: 2020-07-28
Payer: MEDICAID

## 2020-07-28 VITALS
HEART RATE: 107 BPM | RESPIRATION RATE: 14 BRPM | WEIGHT: 226.31 LBS | DIASTOLIC BLOOD PRESSURE: 68 MMHG | HEIGHT: 65 IN | TEMPERATURE: 98.9 F | BODY MASS INDEX: 37.7 KG/M2 | OXYGEN SATURATION: 95 % | SYSTOLIC BLOOD PRESSURE: 119 MMHG

## 2020-07-28 PROCEDURE — 74011000250 HC RX REV CODE- 250: Performed by: NURSE ANESTHETIST, CERTIFIED REGISTERED

## 2020-07-28 PROCEDURE — 76040000019: Performed by: INTERNAL MEDICINE

## 2020-07-28 PROCEDURE — 74011250636 HC RX REV CODE- 250/636: Performed by: NURSE ANESTHETIST, CERTIFIED REGISTERED

## 2020-07-28 PROCEDURE — 88305 TISSUE EXAM BY PATHOLOGIST: CPT

## 2020-07-28 PROCEDURE — 77030021593 HC FCPS BIOP ENDOSC BSC -A: Performed by: INTERNAL MEDICINE

## 2020-07-28 PROCEDURE — 76060000031 HC ANESTHESIA FIRST 0.5 HR: Performed by: INTERNAL MEDICINE

## 2020-07-28 RX ORDER — SODIUM CHLORIDE 9 MG/ML
50 INJECTION, SOLUTION INTRAVENOUS CONTINUOUS
Status: DISCONTINUED | OUTPATIENT
Start: 2020-07-28 | End: 2020-07-28 | Stop reason: HOSPADM

## 2020-07-28 RX ORDER — PROPOFOL 10 MG/ML
INJECTION, EMULSION INTRAVENOUS AS NEEDED
Status: DISCONTINUED | OUTPATIENT
Start: 2020-07-28 | End: 2020-07-28 | Stop reason: HOSPADM

## 2020-07-28 RX ORDER — MIDAZOLAM HYDROCHLORIDE 1 MG/ML
.25-5 INJECTION, SOLUTION INTRAMUSCULAR; INTRAVENOUS
Status: DISCONTINUED | OUTPATIENT
Start: 2020-07-28 | End: 2020-07-28 | Stop reason: HOSPADM

## 2020-07-28 RX ORDER — GLYCOPYRROLATE 0.2 MG/ML
INJECTION INTRAMUSCULAR; INTRAVENOUS AS NEEDED
Status: DISCONTINUED | OUTPATIENT
Start: 2020-07-28 | End: 2020-07-28 | Stop reason: HOSPADM

## 2020-07-28 RX ORDER — NALOXONE HYDROCHLORIDE 0.4 MG/ML
0.4 INJECTION, SOLUTION INTRAMUSCULAR; INTRAVENOUS; SUBCUTANEOUS
Status: DISCONTINUED | OUTPATIENT
Start: 2020-07-28 | End: 2020-07-28 | Stop reason: HOSPADM

## 2020-07-28 RX ORDER — EPINEPHRINE 0.1 MG/ML
1 INJECTION INTRACARDIAC; INTRAVENOUS
Status: DISCONTINUED | OUTPATIENT
Start: 2020-07-28 | End: 2020-07-28 | Stop reason: HOSPADM

## 2020-07-28 RX ORDER — FLUMAZENIL 0.1 MG/ML
0.2 INJECTION INTRAVENOUS
Status: DISCONTINUED | OUTPATIENT
Start: 2020-07-28 | End: 2020-07-28 | Stop reason: HOSPADM

## 2020-07-28 RX ORDER — FENTANYL CITRATE 50 UG/ML
25-200 INJECTION, SOLUTION INTRAMUSCULAR; INTRAVENOUS
Status: DISCONTINUED | OUTPATIENT
Start: 2020-07-28 | End: 2020-07-28 | Stop reason: HOSPADM

## 2020-07-28 RX ORDER — LIDOCAINE HYDROCHLORIDE 20 MG/ML
INJECTION, SOLUTION EPIDURAL; INFILTRATION; INTRACAUDAL; PERINEURAL AS NEEDED
Status: DISCONTINUED | OUTPATIENT
Start: 2020-07-28 | End: 2020-07-28 | Stop reason: HOSPADM

## 2020-07-28 RX ORDER — SODIUM CHLORIDE 0.9 % (FLUSH) 0.9 %
5-40 SYRINGE (ML) INJECTION AS NEEDED
Status: DISCONTINUED | OUTPATIENT
Start: 2020-07-28 | End: 2020-07-28 | Stop reason: HOSPADM

## 2020-07-28 RX ORDER — SODIUM CHLORIDE 0.9 % (FLUSH) 0.9 %
5-40 SYRINGE (ML) INJECTION EVERY 8 HOURS
Status: DISCONTINUED | OUTPATIENT
Start: 2020-07-28 | End: 2020-07-28 | Stop reason: HOSPADM

## 2020-07-28 RX ORDER — DEXTROMETHORPHAN/PSEUDOEPHED 2.5-7.5/.8
1.2 DROPS ORAL
Status: DISCONTINUED | OUTPATIENT
Start: 2020-07-28 | End: 2020-07-28 | Stop reason: HOSPADM

## 2020-07-28 RX ORDER — ATROPINE SULFATE 0.1 MG/ML
0.5 INJECTION INTRAVENOUS
Status: DISCONTINUED | OUTPATIENT
Start: 2020-07-28 | End: 2020-07-28 | Stop reason: HOSPADM

## 2020-07-28 RX ORDER — SODIUM CHLORIDE 9 MG/ML
INJECTION, SOLUTION INTRAVENOUS
Status: DISCONTINUED | OUTPATIENT
Start: 2020-07-28 | End: 2020-07-28 | Stop reason: HOSPADM

## 2020-07-28 RX ADMIN — PROPOFOL 50 MG: 10 INJECTION, EMULSION INTRAVENOUS at 15:19

## 2020-07-28 RX ADMIN — PROPOFOL 50 MG: 10 INJECTION, EMULSION INTRAVENOUS at 15:17

## 2020-07-28 RX ADMIN — PROPOFOL 50 MG: 10 INJECTION, EMULSION INTRAVENOUS at 15:22

## 2020-07-28 RX ADMIN — PROPOFOL 50 MG: 10 INJECTION, EMULSION INTRAVENOUS at 15:18

## 2020-07-28 RX ADMIN — SODIUM CHLORIDE: 900 INJECTION, SOLUTION INTRAVENOUS at 15:11

## 2020-07-28 RX ADMIN — PROPOFOL 50 MG: 10 INJECTION, EMULSION INTRAVENOUS at 15:27

## 2020-07-28 RX ADMIN — GLYCOPYRROLATE 0.2 MG: 0.2 INJECTION, SOLUTION INTRAMUSCULAR; INTRAVENOUS at 14:52

## 2020-07-28 RX ADMIN — LIDOCAINE HYDROCHLORIDE 50 MG: 20 INJECTION, SOLUTION EPIDURAL; INFILTRATION; INTRACAUDAL; PERINEURAL at 15:16

## 2020-07-28 RX ADMIN — PROPOFOL 50 MG: 10 INJECTION, EMULSION INTRAVENOUS at 15:31

## 2020-07-28 RX ADMIN — PROPOFOL 150 MG: 10 INJECTION, EMULSION INTRAVENOUS at 15:16

## 2020-07-28 RX ADMIN — PROPOFOL 50 MG: 10 INJECTION, EMULSION INTRAVENOUS at 15:20

## 2020-07-28 NOTE — PERIOP NOTES
Stood patient on side of bed. Pain 0 on scale of 0-10    Notified of pain level 0.   Ciro Nissen for DC

## 2020-07-28 NOTE — PROCEDURES
[address], [facility] [address]      Colonoscopy Operative Report    Joesph Delgadillo  [id number]  1975      Procedure Type:   Colonoscopy --diagnostic     Indications:    Diarrhea         Pre-operative Diagnosis: see indication above    Post-operative Diagnosis:  See findings below    :  Lorie Mckay. Sade Feliciano MD      Referring Provider: Mirna Castellanos NP      Sedation:  MAC anesthesia Propofol      Procedure Details:  After informed consent was obtained with all risks and benefits of procedure explained and preoperative exam completed, the patient was taken to the endoscopy suite and placed in the left lateral decubitus position. Upon sequential sedation as per above, a digital rectal exam was performed demonstrating internal hemorrhoids. The Olympus pediatric videocolonoscope  was inserted in the rectum and carefully advanced to the terminal ileum. The cecum was identified by the ileocecal valve and appendiceal orifice. The quality of preparation was good. The colonoscope was slowly withdrawn with careful evaluation between folds. Retroflexion in the rectum was completed . Findings:   Rectum: two sessile 2-3 mm polyps - removed with cold biopsy forceps  Sigmoid: normal  Descending Colon: normal  Transverse Colon: normal  Ascending Colon: normal  Cecum: normal  Terminal Ileum: normal    Random colon biopsies taken with cold biopsy forceps. Specimen Removed: 1. Random colon, 2. Rectal polyps    Complications: None. EBL:  None. Impression:    As above    Recommendations:  1. Follow up surgical pathology  2. If adenomatous, repeat colonoscopy in 5 years. If hyperplastic, repeat colonoscopy in 10 years  3. Office follow up in 3-4 weeks  4. High fiber diet education    Signed By: Lorie Mckay.  Sade Feliciano MD     7/28/2020  3:53 PM

## 2020-07-28 NOTE — H&P
[address]  [address] [facility], [address] [place] [address]      History and Physical       NAME:  Mehreen Link   :   1975   MRN:   [id number]             History of Present Illness:  Patient is a 39 y.o. who is seen for GERD and diarrhea. PMH:  Past Medical History:   Diagnosis Date    Anxiety     Callus of foot     Cervical cancer (HCC)     surgery    Chronic pain     fibromyalgia/cervical and thoracic and lower lumbar DDD    Diabetes ([address]     GERD (gastroesophageal reflux disease)     Headache     Hypertension     intercranial HTN    shunt    Insomnia     Mild intermittent asthma 2020    Diagnosed in her teens and again in her 29's, in adulthood flares about once a year/bronchitis    Recurrent kidney stones     Vertigo        PSH:  Past Surgical History:   Procedure Laterality Date    HX CHOLECYSTECTOMY      HX CSF SHUNT      HX HERNIA REPAIR      HX HYSTERECTOMY      HX LITHOTRIPSY      HX OOPHORECTOMY         Allergies: Allergies   Allergen Reactions    Iodinated Contrast Media Hives    Iodine And Iodide Containing Products Hives    Morphine Hives    Naprosyn [Naproxen] Hives    Nsaids (Non-Steroidal Anti-Inflammatory Drug) Swelling     Swelling all over.  Other Plant, Animal, Environmental Other (comments)     A medication given for vertigo caused headaches. Unsure of name. Home Medications:  Prior to Admission Medications   Prescriptions Last Dose Informant Patient Reported? Taking? Blood-Glucose Meter (OneTouch Verio Meter) misc   No No   Sig: CHECK BLOOD GLUCOSE TWICE A DAY   DULoxetine (Cymbalta) 60 mg capsule 2020 at Unknown time  No Yes   Sig: Take 1 Cap by mouth daily. GaviLyte-G 236-22.74-6.74 -5.86 gram suspension   Yes No   albuterol (PROVENTIL HFA, VENTOLIN HFA, PROAIR HFA) 90 mcg/actuation inhaler Unknown at Unknown time  No No   Sig: Take 2 Puffs by inhalation every six (6) hours as needed for Wheezing or Cough. busPIRone (BUSPAR) 7.5 mg tablet 7/27/2020 at Unknown time  No Yes   Sig: Take 1 Tab by mouth three (3) times daily. butalbital-acetaminophen-caffeine (FIORICET, ESGIC) -40 mg per tablet 7/21/2020 at Unknown time  No Yes   Sig: Take 1 tab every 12 hours as needed for migraine headaches   cyclobenzaprine (FLEXERIL) 5 mg tablet 7/27/2020 at Unknown time  Yes Yes   Sig: Take 5 mg by mouth every morning. cyclobenzaprine (FLEXERIL) 5 mg tablet 7/27/2020 at Unknown time  Yes Yes   Sig: Take 10 mg by mouth every evening. dimenhyDRINATE (DRAMAMINE) 50 mg tablet 7/27/2020 at Unknown time  Yes Yes   Sig: Take 100 mg by mouth nightly. diphenhydrAMINE (Benadryl Allergy) 25 mg tablet 7/28/2020 at Unknown time  Yes Yes   Sig: Take 75 mg by mouth nightly.   gabapentin (NEURONTIN) 600 mg tablet 7/28/2020 at Unknown time  No Yes   Sig: Take 2 Tabs by mouth three (3) times daily. Max Daily Amount: 3,600 mg.   glucose blood VI test strips (OneTouch Verio test strips) strip   No No   Sig: CHECK BLOOD GLUCOSE TWICE A DAY   glucose blood VI test strips (ReliOn Prime Test Strips) strip   No No   Sig: Check BG BID   hydroCHLOROthiazide (HYDRODIURIL) 25 mg tablet 7/28/2020 at Unknown time  Yes Yes   Sig: Take 25 mg by mouth every evening. lisinopriL (PRINIVIL, ZESTRIL) 10 mg tablet 7/28/2020 at Unknown time  No Yes   Sig: Take 1 Tab by mouth daily. metFORMIN (GLUCOPHAGE) 500 mg tablet 7/27/2020 at Unknown time  No Yes   Sig: Take 2 Tabs by mouth two (2) times daily (with meals). omeprazole (PRILOSEC) 40 mg capsule 7/27/2020 at Unknown time  No Yes   Sig: Take 1 Cap by mouth daily. ondansetron (ZOFRAN ODT) 4 mg disintegrating tablet 7/21/2020 at Unknown time  No Yes   Sig: Take 1 Tab by mouth every eight (8) hours as needed for Nausea. topiramate (TOPAMAX) 100 mg tablet 7/27/2020 at Unknown time  No Yes   Sig: Take 1 Tab by mouth two (2) times a day.    traZODone (DESYREL) 50 mg tablet 7/27/2020 at Unknown time  No Yes   Sig: Take 3 Tabs by mouth nightly. Indications: insomnia associated with depression   venlafaxine-SR (EFFEXOR-XR) 75 mg capsule 2020 at Unknown time  No Yes   Sig: TAKE ONE CAPSULE BY MOUTH DAILY      Facility-Administered Medications: None       Hospital Medications:  No current facility-administered medications for this encounter. Social History:  Social History     Tobacco Use    Smoking status: Former Smoker     Packs/day: 1.00     Years: 20.00     Pack years: 20.00     Types: Cigarettes     Last attempt to quit: 2018     Years since quittin.5    Smokeless tobacco: Never Used   Substance Use Topics    Alcohol use: Not Currently       Family History:  Family History   Problem Relation Age of Onset    Lung Cancer Mother     No Known Problems Father     Kidney Disease Maternal Grandmother     No Known Problems Child     No Known Problems Child     Other Paternal Grandmother         polio    Heart Disease Brother         \"loop in heart\"             Review of Systems:      Constitutional: negative fever, negative chills, negative weight loss  Eyes:   negative visual changes  ENT:   negative sore throat, tongue or lip swelling  Respiratory:  negative cough, negative dyspnea  Cards:  negative for chest pain, palpitations, lower extremity edema  GI:   See HPI  :  negative for frequency, dysuria  Integument:  negative for rash and pruritus  Heme:  negative for easy bruising and gum/nose bleeding  Musculoskel: negative for myalgias,  back pain and muscle weakness  Neuro: negative for headaches, dizziness, vertigo  Psych:  negative for feelings of anxiety, depression       Objective:   No data found. No intake/output data recorded. No intake/output data recorded.     EXAM:     NEURO-a&o   HEENT-wnl   LUNGS-clear    COR-regular rate and rhythym     ABD-soft , no tenderness, no rebound, good bs     EXT-no edema     Data Review     No results for input(s): WBC, HGB, HCT, PLT, HGBEXT, HCTEXT, PLTEXT in the last 72 hours. No results for input(s): NA, K, CL, CO2, BUN, CREA, GLU, PHOS, CA in the last 72 hours. No results for input(s): AP, TBIL, TP, ALB, GLOB, GGT, AML, LPSE in the last 72 hours. No lab exists for component: SGOT, GPT, AMYP, HLPSE  No results for input(s): INR, PTP, APTT, INREXT in the last 72 hours. Assessment:     · GERD  ·  Diarrhea     Patient Active Problem List   Diagnosis Code    Mild intermittent asthma J45.20    Vertigo R42    Recurrent kidney stones N20.0    Insomnia G47.00    Hypertension I10    Headache R51    GERD (gastroesophageal reflux disease) K21.9    Diabetes mellitus type 2, noninsulin dependent ([facility] E11.9    Cervical cancer ([facility] C53.9    Callus of foot L84    Anxiety F41.9     Plan:   · The patient was counseled at length about the risks of guadalupe Covid-19 in the jax-operative and post-operative states including the recovery window of their procedure. The patient was made aware that guadalupe Covid-19 after a surgical procedure may worsen their prognosis for recovering from the virus and lend to a higher morbidity and or mortality risk. The patient was given the options of postponing their procedure. All of the risks, benefits, and alternatives were discussed. The patient does wish to proceed with the procedure. · Endoscopic procedure with MAC     Signed By: Sam Coughlin.  Lucy Beauchamp MD     7/28/2020  2:49 PM

## 2020-07-28 NOTE — PERIOP NOTES
Dr. Harmeet Mcdowell came in to examine patient for left quadrant discomfort. Pain 4 on a scale of 0-10. Will watch patient longer to see if pain subsides.

## 2020-07-28 NOTE — DISCHARGE INSTRUCTIONS
[address]  [address] [facility], [address] [place] [address]    EGD/COLON DISCHARGE INSTRUCTIONS    Sharad Barnard  [id number]  1975    Discomfort:  Sore throat- throat lozenges or warm salt water gargle  redness at IV site- apply warm compress to area; if redness or soreness persist- contact your physician  Gaseous discomfort- walking, belching will help relieve any discomfort  You may not operate a vehicle for 12 hours  You may not engage in an occupation involving machinery or appliances for rest of today  You may not drink alcoholic beverages for at least 12 hours  Avoid making any critical decisions for at least 24 hour  DIET  You may resume your regular diet - however -  remember your colon is empty and a heavy meal will produce gas. Avoid these foods:  vegetables, fried / greasy foods, carbonated drinks    ACTIVITY  You may resume your normal daily activities   Spend the remainder of the day resting -  avoid any strenuous activity. CALL M.D. ANY SIGN OF   Increasing pain, nausea, vomiting  Abdominal distension (swelling)  New increased bleeding (oral or rectal)  Fever (chills)  Pain in chest area  Bloody discharge from nose or mouth  Shortness of breath    Follow-up Instructions:   Call Dr. Good Pina for any questions or problems. Telephone # [phone]    ENDOSCOPY FINDINGS:   Your endoscopy showed mild gastritis for which biopsies were taken. Your colonoscopy showed two small polyps, which were removed. Biopsies were taken of the colon to evaluate for causes of your diarrhea. My medical assistant will contact you about the pancreatic digestive enzymes that we discussed. Signed By: Sam Coughlin. Lucy Beauchamp MD     7/28/2020  3:48 PM         Learning About Coronavirus (COVID-19)  Coronavirus (COVID-19): Overview  What is coronavirus ([address])? The coronavirus disease (COVID-19) is caused by a virus. It is an illness that was first found in [place], [place], in December 2019.  It has since spread worldwide. The virus can cause fever, cough, and trouble breathing. In severe cases, it can cause pneumonia and make it hard to breathe without help. It can cause death. Coronaviruses are a large group of viruses. They cause the common cold. They also cause more serious illnesses like Middle East respiratory syndrome (MERS) and severe acute respiratory syndrome (SARS). COVID-19 is caused by a novel coronavirus. That means it's a new type that has not been seen in people before. This virus spreads person-to-person through droplets from coughing and sneezing. It can also spread when you are close to someone who is infected. And it can spread when you touch something that has the virus on it, such as a doorknob or a tabletop. What can you do to protect yourself from coronavirus (COVID-19)? The best way to protect yourself from getting sick is to:  · Avoid areas where there is an outbreak. · Avoid contact with people who may be infected. · Wash your hands often with soap or alcohol-based hand sanitizers. · Avoid crowds and try to stay at least 6 feet away from other people. · Wash your hands often, especially after you cough or sneeze. Use soap and water, and scrub for at least 20 seconds. If soap and water aren't available, use an alcohol-based hand . · Avoid touching your mouth, nose, and eyes. What can you do to avoid spreading the virus to others? To help avoid spreading the virus to others:  · Cover your mouth with a tissue when you cough or sneeze. Then throw the tissue in the trash. · Use a disinfectant to clean things that you touch often. · Stay home if you are sick or have been exposed to the virus. Don't go to school, work, or public areas. And don't use public transportation. · If you are sick:  ? Leave your home only if you need to get medical care. But call the doctor's office first so they know you're coming.  And wear a face mask, if you have one.  ? If you have a face mask, wear it whenever you're around other people. It can help stop the spread of the virus when you cough or sneeze. ? Clean and disinfect your home every day. Use household  and disinfectant wipes or sprays. Take special care to clean things that you grab with your hands. These include doorknobs, remote controls, phones, and handles on your refrigerator and microwave. And don't forget countertops, tabletops, bathrooms, and computer keyboards. When to call for help  Call 911 anytime you think you may need emergency care. For example, call if:  · You have severe trouble breathing. (You can't talk at all.)  · You have constant chest pain or pressure. · You are severely dizzy or lightheaded. · You are confused or can't think clearly. · Your face and lips have a blue color. · You pass out (lose consciousness) or are very hard to wake up. Call your doctor now if you develop symptoms such as:  · Shortness of breath. · Fever. · Cough. If you need to get care, call ahead to the doctor's office for instructions before you go. Make sure you wear a face mask, if you have one, to prevent exposing other people to the virus. Where can you get the latest information? The following health organizations are tracking and studying this virus. Their websites contain the most up-to-date information. Mar Dk also learn what to do if you think you may have been exposed to the virus. · U.S. Centers for Disease Control and Prevention (CDC): The CDC provides updated news about the disease and travel advice. The website also tells you how to prevent the spread of infection. www.cdc.gov  · World Health Organization [facility]): WHO offers information about the virus outbreaks. WHO also has travel advice. www.who.int  Current as of: April 1, 2020               Content Version: 12.4  © 4951-1772 [employer].    Care instructions adapted under license by your healthcare professional. If you have questions about a medical condition or this instruction, always ask your healthcare professional. [place] [address] any warranty or liability for your use of this information.

## 2020-07-28 NOTE — ANESTHESIA POSTPROCEDURE EVALUATION
Post-Anesthesia Evaluation and Assessment    Patient: Teo Yoo MRN: [id number]  SSN: [id number]    YOB: 1975  Age: 39 y.o. Sex: female      I have evaluated the patient and they are stable and ready for discharge from the PACU. Cardiovascular Function/Vital Signs  Visit Vitals  BP 98/45   Pulse (!) 105   Temp 37.2 °C (98.9 °F)   Resp 18   Ht 5' 5\" (1.651 m)   Wt 102.7 kg (226 lb 5 oz)   SpO2 99%   BMI 37.66 kg/m²       Patient is status post MAC anesthesia for Procedure(s):  ESOPHAGOGASTRODUODENOSCOPY (EGD), COLONOSCOPY  COLONOSCOPY     :-  ESOPHAGOGASTRODUODENAL (EGD) BIOPSY  COLON BIOPSY. Nausea/Vomiting: None    Postoperative hydration reviewed and adequate. Pain:  Pain Scale 1: Numeric (0 - 10) (07/28/20 1445)  Pain Intensity 1: 3 (07/28/20 1445)   Managed    Neurological Status: At baseline    Mental Status, Level of Consciousness: Alert and  oriented to person, place, and time    Pulmonary Status:   O2 Device: CO2 nasal cannula (07/28/20 1537)   Adequate oxygenation and airway patent    Complications related to anesthesia: None    Post-anesthesia assessment completed. No concerns    Signed By: Shalini Enciso MD     July 28, 2020              Procedure(s):  ESOPHAGOGASTRODUODENOSCOPY (EGD), COLONOSCOPY  COLONOSCOPY     :-  ESOPHAGOGASTRODUODENAL (EGD) BIOPSY  COLON BIOPSY. MAC    <BSHSIANPOST>    INITIAL Post-op Vital signs: No vitals data found for the desired time range.

## 2020-07-28 NOTE — ROUTINE PROCESS
Mehreen Link 1975 
[id number] Situation: 
Verbal report received from: April Hickey Procedure: Procedure(s): ESOPHAGOGASTRODUODENOSCOPY (EGD), COLONOSCOPY 
COLONOSCOPY     :- 
ESOPHAGOGASTRODUODENAL (EGD) BIOPSY COLON BIOPSY Background: 
 
Preoperative diagnosis: DIARRHEA, GERD Postoperative diagnosis: Gastritis 
diarrhea, rectal polyps, internal hemorrhoids :  Dr. Perez Huffman Assistant(s): Endoscopy Technician-1: Rock Wu Endoscopy RN-1: Charlette Lebron RN Specimens:  
ID Type Source Tests Collected by Time Destination 1 : duodenal bx Preservative Duodenum  Priya Giraldo MD 7/28/2020 1520 Pathology 2 : gastric bx Preservative Gastric  Priya Giraldo MD 7/28/2020 1523 Pathology 3 : random colon bx Preservative   Priya Giraldo MD 7/28/2020 1528 Pathology 4 : rectal polyps Preservative Rectum  Priya Giraldo MD 7/28/2020 1532 Pathology H. Pylori Assessment: 
Intra-procedure medications Anesthesia gave intra-procedure sedation and medications, see anesthesia flow sheet yes Intravenous fluids: NS@ [place] Vital signs stable yes Abdominal assessment: round and soft yes Recommendation: 
Discharge patient per MD order Return to floor Family or Friend yes Permission to share finding with family or friend yes

## 2020-07-28 NOTE — PROCEDURES
[address]  [address] [facility], [address] [facility] (EGD) Procedure Note    Mike Busing  1975  [id number]      Procedure: Endoscopic Gastroduodenoscopy --diagnostic, with biopsy    Indication:  GERD     Pre-operative Diagnosis: see indication above    Post-operative Diagnosis: see findings below    : Geraldine Wahl. Irving Mcclelland MD    Referring Provider: Sobia Barney NP      Anesthesia/Sedation:  MAC anesthesia Propofol        Procedure Details     After informed consent was obtained for the procedure, with all risks and benefits of procedure explained the patient was taken to the endoscopy suite and placed in the left lateral decubitus position. Following sequential administration of sedation as per above, the endoscope was inserted into the mouth and advanced under direct vision to second portion of the duodenum. A careful inspection was made as the gastroscope was withdrawn, including a retroflexed view of the proximal stomach; findings and interventions are described below. Findings:   Esophagus:normal  Stomach: mild patchy antral erythema - cold biopsies taken  Duodenum: normal to second portion - cold biopsies taken    Therapies:  As above    Specimens: 1. Duodenum, 2. Gastric         EBL: None      Complications:   None; patient tolerated the procedure well. Impression:    As above    Recommendations:  1. Follow up surgical pathology  2. Treat for H pylori, if positive  3. Avoid non-essential NSAID's, alcohol, and tobacco products  4. Continue PPI  5. Proceed to colonoscopy    Signed By: Geraldine Wahl.  Irving Mcclelland MD     7/28/2020  3:49 PM

## 2020-07-28 NOTE — PERIOP NOTES

## 2020-07-28 NOTE — ANESTHESIA PREPROCEDURE EVALUATION
Relevant Problems   No relevant active problems       Anesthetic History   No history of anesthetic complications            Review of Systems / Medical History  Patient summary reviewed, nursing notes reviewed and pertinent labs reviewed    Pulmonary              Pertinent negatives: No asthma     Neuro/Psych         Psychiatric history     Cardiovascular    Hypertension              Exercise tolerance: >4 METS     GI/Hepatic/Renal     GERD: poorly controlled           Endo/Other    Diabetes    Obesity     Other Findings   Comments:  shunt for intracranial htn         Physical Exam    Airway  Mallampati: II  TM Distance: 4 - 6 cm  Neck ROM: normal range of motion   Mouth opening: Normal     Cardiovascular  Regular rate and rhythm,  S1 and S2 normal,  no murmur, click, rub, or gallop             Dental  No notable dental hx       Pulmonary  Breath sounds clear to auscultation               Abdominal  GI exam deferred       Other Findings            Anesthetic Plan    ASA: 3  Anesthesia type: general          Induction: Intravenous  Anesthetic plan and risks discussed with: Patient

## 2020-07-30 DIAGNOSIS — R51.9 DAILY HEADACHE: Primary | ICD-10-CM

## 2020-07-31 ENCOUNTER — VIRTUAL VISIT (OUTPATIENT)
Dept: FAMILY MEDICINE CLINIC | Age: 45
End: 2020-07-31

## 2020-07-31 ENCOUNTER — DOCUMENTATION ONLY (OUTPATIENT)
Dept: NEUROLOGY | Age: 45
End: 2020-07-31

## 2020-07-31 DIAGNOSIS — M54.9 CHRONIC BACK PAIN, UNSPECIFIED BACK LOCATION, UNSPECIFIED BACK PAIN LATERALITY: ICD-10-CM

## 2020-07-31 DIAGNOSIS — I10 ESSENTIAL HYPERTENSION: ICD-10-CM

## 2020-07-31 DIAGNOSIS — G89.29 CHRONIC BACK PAIN, UNSPECIFIED BACK LOCATION, UNSPECIFIED BACK PAIN LATERALITY: ICD-10-CM

## 2020-07-31 DIAGNOSIS — F51.05 INSOMNIA DUE TO OTHER MENTAL DISORDER: ICD-10-CM

## 2020-07-31 DIAGNOSIS — E11.42 CONTROLLED TYPE 2 DIABETES MELLITUS WITH DIABETIC POLYNEUROPATHY, WITHOUT LONG-TERM CURRENT USE OF INSULIN (HCC): ICD-10-CM

## 2020-07-31 DIAGNOSIS — M79.7 FIBROMYALGIA: ICD-10-CM

## 2020-07-31 DIAGNOSIS — F99 INSOMNIA DUE TO OTHER MENTAL DISORDER: ICD-10-CM

## 2020-07-31 DIAGNOSIS — F41.1 GAD (GENERALIZED ANXIETY DISORDER): Primary | ICD-10-CM

## 2020-07-31 RX ORDER — TRAZODONE HYDROCHLORIDE 50 MG/1
150 TABLET ORAL
Qty: 270 TAB | Refills: 0 | Status: SHIPPED | OUTPATIENT
Start: 2020-07-31 | End: 2020-09-11 | Stop reason: SDUPTHER

## 2020-07-31 NOTE — PROGRESS NOTES
[facility] Note  Subjective:      Bryanna Remy is a 39 y.o. female who presents for an acute visit with the following chief complaints. Chief Complaint   Patient presents with    Other     Spot on belly.  Hypertension    Diabetes     I was in the office while conducting this encounter. Consent:  She and/or her healthcare decision maker is aware that this patient-initiated Telehealth encounter is a billable service, with coverage as determined by her insurance carrier. She is aware that she may receive a bill and has provided verbal consent to proceed: Yes    This virtual visit was conducted via [place]. Pursuant to the emergency declaration under the [facility], [facility] waiver authority and the [employer] Act, this Virtual  Visit was conducted to reduce the patient's risk of exposure to COVID-19 and provide continuity of care for an established patient. Services were provided through a video synchronous discussion virtually to substitute for in-person clinic visit. Due to this being a TeleHealth evaluation, many elements of the physical examination are unable to be assessed. Total Time: minutes: 11-20 minutes. Mental Health and Chronic pain   History of anxiety, insomnia, fibromyalgia. Denies depressed mood. Anxiety was elevated recently which included daily worry, trouble focusing, crying often. Current therapy: Cymbalta 60 mg daily, Trazodone 100 mg nightly. Buspar 7.5 mg TID was added 10 days ago which she reports is helpful. Neurologist added Effexor 75 mg daily a few months ago for chronic pain and headaches. No side effects to therapy. She does not want to be on so many medications. Not in counseling was referred on 7/21. Previously reported pain as manageable, today however, she reports increased pain. Pain is mostly of the back.  Reports history of fibromyalgia and arthritis of the back and neck. She is palak to due ADL's. Treatments: Gabapentin 600 mg TID, SNRI's as above, muscle relaxant PRN which she is taking BID. Taking all with continued pain. She is worried about possible umbilical hernia. She feels a hard spot, no pain. Having regular BM's. Advised that she needs in office visit and she reports she will schedule. Current Outpatient Medications   Medication Sig Dispense Refill    traZODone (DESYREL) 50 mg tablet Take 3 Tabs by mouth nightly. Indications: insomnia associated with depression 270 Tab 0    omeprazole (PRILOSEC) 40 mg capsule Take 1 Cap by mouth daily. 90 Cap 0    busPIRone (BUSPAR) 7.5 mg tablet Take 1 Tab by mouth three (3) times daily. 90 Tab 1    ondansetron (ZOFRAN ODT) 4 mg disintegrating tablet Take 1 Tab by mouth every eight (8) hours as needed for Nausea. 12 Tab 0    hydroCHLOROthiazide (HYDRODIURIL) 25 mg tablet Take 25 mg by mouth every evening.  diphenhydrAMINE (Benadryl Allergy) 25 mg tablet Take 75 mg by mouth nightly.  cyclobenzaprine (FLEXERIL) 5 mg tablet Take 5 mg by mouth two (2) times a day.  metFORMIN (GLUCOPHAGE) 500 mg tablet Take 2 Tabs by mouth two (2) times daily (with meals). 360 Tab 1    topiramate (TOPAMAX) 100 mg tablet Take 1 Tab by mouth two (2) times a day. 60 Tab 3    Blood-Glucose Meter (OneTouch Verio Meter) misc CHECK BLOOD GLUCOSE TWICE A DAY 1 Each 0    glucose blood VI test strips (OneTouch Verio test strips) strip CHECK BLOOD GLUCOSE TWICE A  Strip 1    lisinopriL (PRINIVIL, ZESTRIL) 10 mg tablet Take 1 Tab by mouth daily. 60 Tab 0    gabapentin (NEURONTIN) 600 mg tablet Take 2 Tabs by mouth three (3) times daily.  Max Daily Amount: 3,600 mg. 180 Tab 1    butalbital-acetaminophen-caffeine (FIORICET, ESGIC) -40 mg per tablet Take 1 tab every 12 hours as needed for migraine headaches 30 Tab 0    albuterol (PROVENTIL HFA, VENTOLIN HFA, PROAIR HFA) 90 mcg/actuation inhaler Take 2 Puffs by inhalation every six (6) hours as needed for Wheezing or Cough. 1 Inhaler 0    DULoxetine (Cymbalta) 60 mg capsule Take 1 Cap by mouth daily. 90 Cap 0     Allergies   Allergen Reactions    Iodinated Contrast Media Hives    Iodine And Iodide Containing Products Hives    Morphine Hives    Naprosyn [Naproxen] Hives    Nsaids (Non-Steroidal Anti-Inflammatory Drug) Swelling     Swelling all over.  Other Plant, Animal, Environmental Other (comments)     A medication given for vertigo caused headaches. Unsure of name. ROS:   Complete review of systems was reviewed with pertinent information listed in HPI. Review of Systems   Constitutional: Negative for chills, diaphoresis, fever, malaise/fatigue and weight loss. HENT: Negative for congestion, ear pain, hearing loss, sinus pain, sore throat and tinnitus. Eyes: Negative for blurred vision and double vision. Respiratory: Negative for shortness of breath. Cardiovascular: Negative for chest pain, palpitations and leg swelling. Gastrointestinal: Negative for abdominal pain, blood in stool, constipation, diarrhea, heartburn, melena, nausea and vomiting. Genitourinary: Negative for dysuria, frequency, hematuria and urgency. Chronic pain with urination - managed by [facility]    Musculoskeletal: Positive for back pain and myalgias. Negative for joint pain. Skin: Negative for itching and rash. Neurological: Negative for dizziness, tingling, weakness and headaches. Endo/Heme/Allergies: Negative for polydipsia. Psychiatric/Behavioral: Negative for depression, hallucinations, memory loss, substance abuse and suicidal ideas. The patient is nervous/anxious. The patient does not have insomnia. Objective:     Visit Vitals  LMP  (LMP Unknown)       Vitals and Nurse Documentation reviewed. Physical Exam  Constitutional:       General: She is not in acute distress. Appearance: Normal appearance.  She is well-developed, well-groomed and normal weight. She is not ill-appearing, toxic-appearing or diaphoretic. HENT:      Head: Normocephalic and atraumatic. Right Ear: Hearing normal.      Left Ear: Hearing normal.      Nose: No nasal deformity. Mouth/Throat:      Lips: Pink. No lesions. Mouth: Mucous membranes are moist.   Eyes:      General: Lids are normal.      Conjunctiva/sclera: Conjunctivae normal.   Pulmonary:      Effort: Pulmonary effort is normal.   Neurological:      Mental Status: She is alert and oriented to person, place, and time. Gait: Gait is intact. Psychiatric:         Attention and Perception: Attention normal.         Mood and Affect: Mood normal.         Speech: Speech normal.         Behavior: Behavior normal. Behavior is cooperative. Thought Content: Thought content normal.         Cognition and Memory: Cognition normal.         Judgment: Judgment normal.         Assessment/Plan:     Diagnoses and all orders for this visit:    1. DEANN (generalized anxiety disorder): Longstanding issue, moderately controlled. Reports improvement since starting Buspar 7.5 mg TID 10 days ago. On Cymbalta 60 mg daily and Effexor was recently added by neurologist. Maranda Rey to stop Effexor as this is duplicate therapy. Follow-up in 3-4 weeks, continue buspar, will increase Cymbalta if needed. Referred to CBT on 7/21 - encouraged she schedule. -     traZODone (DESYREL) 50 mg tablet; Take 3 Tabs by mouth nightly. Indications: insomnia associated with depression    2. Insomnia due to other mental disorder: Stable, controlled with trazodone. Continue current therapy. -     traZODone (DESYREL) 50 mg tablet; Take 3 Tabs by mouth nightly. Indications: insomnia associated with depression    3. Fibromyalgia: Longstanding history of reported fibromyalgia, chronic pain and reported ddd of spine. On SNRI, muscle relaxant, gabapentin with continued pain.  Advised she follow-up with PM&R with [facility] for further evaluation and treatment.   -     REFERRAL TO PHYSIATRY    4. Chronic back pain, unspecified back location, unspecified back pain laterality: see above  -     REFERRAL TO PHYSIATRY    5. Essential hypertension: Stable,  Continue current therapy. Advised in office visit in 2-3 weeks. 6. Controlled type 2 diabetes mellitus with diabetic polyneuropathy, without long-term current use of insulin ([address]): A1c at goal (goal <7%) in 4/2020, on metformin. Check A1c at next visit. Follow-up and Dispositions    · Return in about 2 weeks (around 8/14/2020) for Follow-up. Discussed expected course/resolution/complications of diagnosis in detail with patient.  Medication risks/benefits/costs/interactions/alternatives discussed with patient.  Pt expressed understanding with the diagnosis and plan

## 2020-07-31 NOTE — PROGRESS NOTES
I offered patient an appointment in office today, per Dr. Recinos Prior. Patient is unable to come in today. She agreed to call back next week and schedule an in office appointment.

## 2020-07-31 NOTE — PROGRESS NOTES
Chief Complaint   Patient presents with    Other     Spot on belly.  Hypertension    Diabetes     1. Have you been to the ER, urgent care clinic since your last visit? Hospitalized since your last visit? No    2. Have you seen or consulted any other health care providers outside of the [address] [place] [address] since your last visit? Include any pap smears or colon screening.  No

## 2020-08-03 ENCOUNTER — TELEPHONE (OUTPATIENT)
Dept: NEUROLOGY | Facility: CLINIC | Age: 45
End: 2020-08-03

## 2020-08-03 NOTE — TELEPHONE ENCOUNTER
Pre certification department for [facility] [place] calling needing clarification on orders.  Please call back

## 2020-08-03 NOTE — TELEPHONE ENCOUNTER
Spoke with pre cert in regards to patient's CT. Clarified as to why previously ordered MRI was canceled (patient has a shunt).

## 2020-08-07 ENCOUNTER — PATIENT MESSAGE (OUTPATIENT)
Dept: FAMILY MEDICINE CLINIC | Age: 45
End: 2020-08-07

## 2020-08-07 DIAGNOSIS — E27.8 ADRENAL INCIDENTALOMA (HCC): Primary | ICD-10-CM

## 2020-08-07 PROBLEM — D35.02 ADRENAL ADENOMA, LEFT: Status: ACTIVE | Noted: 2020-07-17

## 2020-08-10 ENCOUNTER — VIRTUAL VISIT (OUTPATIENT)
Dept: FAMILY MEDICINE CLINIC | Age: 45
End: 2020-08-10
Payer: MEDICAID

## 2020-08-10 DIAGNOSIS — F41.1 GAD (GENERALIZED ANXIETY DISORDER): Primary | ICD-10-CM

## 2020-08-10 PROCEDURE — 90791 PSYCH DIAGNOSTIC EVALUATION: CPT | Performed by: SOCIAL WORKER
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
## 2020-08-12 ENCOUNTER — TELEPHONE (OUTPATIENT)
Dept: FAMILY MEDICINE CLINIC | Age: 45
End: 2020-08-12

## 2020-08-12 NOTE — TELEPHONE ENCOUNTER
Chief Complaint   Patient presents with    Prior Auth     Prior Auth: [email or web address] Strips      Called and spoke to pharmacy r/t Strips denied and if there were any other  Test strips she could use that insurance would pay. Pharmacy Tech stated patient picked up a One Touch Meter and that the strips for that machine is payed by her insurance, so [employer] Strips were d/c and Strips for One Touch was order .

## 2020-08-13 DIAGNOSIS — G43.001 MIGRAINE WITHOUT AURA AND WITH STATUS MIGRAINOSUS, NOT INTRACTABLE: ICD-10-CM

## 2020-08-13 RX ORDER — CYCLOBENZAPRINE HCL 5 MG
TABLET ORAL
Qty: 90 TAB | Refills: 0 | Status: SHIPPED | OUTPATIENT
Start: 2020-08-13 | End: 2020-09-10

## 2020-08-13 RX ORDER — BUTALBITAL, ACETAMINOPHEN AND CAFFEINE 50; 325; 40 MG/1; MG/1; MG/1
TABLET ORAL
Qty: 30 TAB | Refills: 0 | Status: SHIPPED | OUTPATIENT
Start: 2020-08-13 | End: 2020-09-10

## 2020-08-14 DIAGNOSIS — M79.7 FIBROMYALGIA: ICD-10-CM

## 2020-08-14 RX ORDER — ALBUTEROL SULFATE 90 UG/1
2 AEROSOL, METERED RESPIRATORY (INHALATION)
Qty: 1 INHALER | Refills: 0 | Status: SHIPPED | OUTPATIENT
Start: 2020-08-14 | End: 2020-11-02 | Stop reason: SDUPTHER

## 2020-08-14 RX ORDER — GABAPENTIN 600 MG/1
1200 TABLET ORAL 3 TIMES DAILY
Qty: 180 TAB | Refills: 0 | Status: SHIPPED | OUTPATIENT
Start: 2020-08-14 | End: 2020-09-15 | Stop reason: SDUPTHER

## 2020-08-14 NOTE — TELEPHONE ENCOUNTER
Maddy Vazquez,    Request for new rx. Check . Thanks, Paulino Mancilla    Last Visit: VV 7/31/20  NP Tomasz Chacko  Next Appointment: Not scheduled  Previous Refill Encounter(s): 6/4/20  180 + 1 (2 month supply)    Requested Prescriptions     Pending Prescriptions Disp Refills    gabapentin (NEURONTIN) 600 mg tablet 180 Tab 1     Sig: Take 2 Tabs by mouth three (3) times daily. Max Daily Amount: 3,600 mg.

## 2020-08-18 ENCOUNTER — TELEPHONE (OUTPATIENT)
Dept: FAMILY MEDICINE CLINIC | Age: 45
End: 2020-08-18

## 2020-08-18 RX ORDER — GABAPENTIN 600 MG/1
1200 TABLET ORAL 3 TIMES DAILY
Qty: 180 TAB | Refills: 1 | OUTPATIENT
Start: 2020-08-18

## 2020-08-18 NOTE — TELEPHONE ENCOUNTER
Chief Complaint   Patient presents with    Other     Endocrinology- [place]-Requesting for Consult or Referral      Faxed and confirmed and scanned.

## 2020-08-27 ENCOUNTER — PATIENT MESSAGE (OUTPATIENT)
Dept: FAMILY MEDICINE CLINIC | Age: 45
End: 2020-08-27

## 2020-08-29 ENCOUNTER — TELEPHONE (OUTPATIENT)
Dept: FAMILY MEDICINE CLINIC | Age: 45
End: 2020-08-29

## 2020-08-29 NOTE — TELEPHONE ENCOUNTER
----- Message from Monse Flores sent at 8/24/2020  3:52 PM EDT -----  Regarding: RYAN Bran/telephone  Contact: [phone]  Caller's first and last name and relationship to patient (if not the patient): N/A  Best contact number: [phone]  Preferred date and time: ASAP in person  Scheduled appointment date and time: N/A  Reason for appointment: Pt advised that RYAN Faustin told her to schedule an appointment in person preferably this week.   Details to clarify the request: N/A

## 2020-09-09 DIAGNOSIS — G43.001 MIGRAINE WITHOUT AURA AND WITH STATUS MIGRAINOSUS, NOT INTRACTABLE: ICD-10-CM

## 2020-09-10 RX ORDER — BUTALBITAL, ACETAMINOPHEN AND CAFFEINE 50; 325; 40 MG/1; MG/1; MG/1
TABLET ORAL
Qty: 30 TAB | Refills: 0 | Status: SHIPPED | OUTPATIENT
Start: 2020-09-10 | End: 2020-10-14

## 2020-09-10 RX ORDER — CYCLOBENZAPRINE HCL 5 MG
TABLET ORAL
Qty: 90 TAB | Refills: 0 | Status: SHIPPED | OUTPATIENT
Start: 2020-09-10 | End: 2020-10-08

## 2020-09-11 DIAGNOSIS — F99 INSOMNIA DUE TO OTHER MENTAL DISORDER: ICD-10-CM

## 2020-09-11 DIAGNOSIS — F51.05 INSOMNIA DUE TO OTHER MENTAL DISORDER: ICD-10-CM

## 2020-09-11 DIAGNOSIS — F41.1 GAD (GENERALIZED ANXIETY DISORDER): ICD-10-CM

## 2020-09-11 NOTE — TELEPHONE ENCOUNTER
Last visit 07/31/2020 Virtual visit RYAN Stanton   Next appointment 09/15/2020 RYAN Stanton   Previous refill encounter(s) 07/31/2020 Desyrel #270     Requested Prescriptions     Pending Prescriptions Disp Refills    traZODone (DESYREL) 50 mg tablet 270 Tab 0     Sig: Take 3 Tabs by mouth nightly.  Indications: insomnia associated with depression

## 2020-09-13 DIAGNOSIS — F51.05 INSOMNIA DUE TO OTHER MENTAL DISORDER: ICD-10-CM

## 2020-09-13 DIAGNOSIS — I10 ESSENTIAL HYPERTENSION: ICD-10-CM

## 2020-09-13 DIAGNOSIS — F99 INSOMNIA DUE TO OTHER MENTAL DISORDER: ICD-10-CM

## 2020-09-13 DIAGNOSIS — F41.1 GAD (GENERALIZED ANXIETY DISORDER): ICD-10-CM

## 2020-09-13 DIAGNOSIS — M79.7 FIBROMYALGIA: ICD-10-CM

## 2020-09-13 RX ORDER — LISINOPRIL 10 MG/1
10 TABLET ORAL DAILY
Qty: 30 TAB | Refills: 0 | Status: CANCELLED | OUTPATIENT
Start: 2020-09-13

## 2020-09-13 RX ORDER — DULOXETIN HYDROCHLORIDE 60 MG/1
60 CAPSULE, DELAYED RELEASE ORAL DAILY
Qty: 90 CAP | Refills: 0 | Status: CANCELLED | OUTPATIENT
Start: 2020-09-13

## 2020-09-14 DIAGNOSIS — M79.7 FIBROMYALGIA: ICD-10-CM

## 2020-09-14 DIAGNOSIS — F41.1 GAD (GENERALIZED ANXIETY DISORDER): ICD-10-CM

## 2020-09-14 DIAGNOSIS — F51.05 INSOMNIA DUE TO OTHER MENTAL DISORDER: ICD-10-CM

## 2020-09-14 DIAGNOSIS — F99 INSOMNIA DUE TO OTHER MENTAL DISORDER: ICD-10-CM

## 2020-09-14 DIAGNOSIS — I10 ESSENTIAL HYPERTENSION: ICD-10-CM

## 2020-09-14 RX ORDER — DULOXETIN HYDROCHLORIDE 60 MG/1
60 CAPSULE, DELAYED RELEASE ORAL DAILY
Qty: 90 CAP | Refills: 0 | Status: CANCELLED | OUTPATIENT
Start: 2020-09-14

## 2020-09-14 RX ORDER — TRAZODONE HYDROCHLORIDE 50 MG/1
150 TABLET ORAL
Qty: 270 TAB | Refills: 0 | Status: CANCELLED | OUTPATIENT
Start: 2020-09-14

## 2020-09-14 RX ORDER — LISINOPRIL 10 MG/1
10 TABLET ORAL DAILY
Qty: 30 TAB | Refills: 0 | Status: CANCELLED | OUTPATIENT
Start: 2020-09-14

## 2020-09-14 NOTE — TELEPHONE ENCOUNTER
PCP: Bebeto Woo NP    Last appt: 7/31/2020  Future Appointments   Date Time Provider Lukasz Zapata   9/15/2020 10:00 AM Bebeto Woo NP [facility] AMB   9/29/2020  3:20 PM Colin Choudhury MD [facility] AMB       Requested Prescriptions     Pending Prescriptions Disp Refills    DULoxetine (Cymbalta) 60 mg capsule 90 Cap 0     Sig: Take 1 Cap by mouth daily.  traZODone (DESYREL) 50 mg tablet 270 Tab 0     Sig: Take 3 Tabs by mouth nightly. Indications: insomnia associated with depression    lisinopriL (PRINIVIL, ZESTRIL) 10 mg tablet 30 Tab 0     Sig: Take 1 Tab by mouth daily.

## 2020-09-14 NOTE — TELEPHONE ENCOUNTER
PCP: Meaghan Montanez NP    Last appt: 9/2/2020  Future Appointments   Date Time Provider Lukasz Zapata   9/15/2020 10:00 AM Meaghan Montanez NP [facility]   9/29/2020  3:20 PM [facility]       Requested Prescriptions     Pending Prescriptions Disp Refills    DULoxetine (Cymbalta) 60 mg capsule 90 Cap 0     Sig: Take 1 Cap by mouth daily.  lisinopriL (PRINIVIL, ZESTRIL) 10 mg tablet 30 Tab 0     Sig: Take 1 Tab by mouth daily.  traZODone (DESYREL) 50 mg tablet 270 Tab 0     Sig: Take 3 Tabs by mouth nightly.  Indications: insomnia associated with depression

## 2020-09-15 ENCOUNTER — OFFICE VISIT (OUTPATIENT)
Dept: FAMILY MEDICINE CLINIC | Age: 45
End: 2020-09-15
Payer: MEDICAID

## 2020-09-15 VITALS
RESPIRATION RATE: 18 BRPM | WEIGHT: 241.6 LBS | OXYGEN SATURATION: 96 % | SYSTOLIC BLOOD PRESSURE: 130 MMHG | HEIGHT: 65 IN | TEMPERATURE: 98.8 F | BODY MASS INDEX: 40.25 KG/M2 | DIASTOLIC BLOOD PRESSURE: 78 MMHG | HEART RATE: 101 BPM

## 2020-09-15 DIAGNOSIS — I10 ESSENTIAL HYPERTENSION: ICD-10-CM

## 2020-09-15 DIAGNOSIS — F51.05 INSOMNIA DUE TO OTHER MENTAL DISORDER: ICD-10-CM

## 2020-09-15 DIAGNOSIS — E66.01 CLASS 3 SEVERE OBESITY DUE TO EXCESS CALORIES WITH BODY MASS INDEX (BMI) OF 40.0 TO 44.9 IN ADULT, UNSPECIFIED WHETHER SERIOUS COMORBIDITY PRESENT (HCC): ICD-10-CM

## 2020-09-15 DIAGNOSIS — M79.7 FIBROMYALGIA: ICD-10-CM

## 2020-09-15 DIAGNOSIS — E11.9 DIABETES MELLITUS TYPE 2, NONINSULIN DEPENDENT (HCC): Primary | ICD-10-CM

## 2020-09-15 DIAGNOSIS — F99 INSOMNIA DUE TO OTHER MENTAL DISORDER: ICD-10-CM

## 2020-09-15 DIAGNOSIS — E27.8 ADRENAL INCIDENTALOMA (HCC): ICD-10-CM

## 2020-09-15 DIAGNOSIS — F41.1 GAD (GENERALIZED ANXIETY DISORDER): ICD-10-CM

## 2020-09-15 DIAGNOSIS — K21.9 GASTROESOPHAGEAL REFLUX DISEASE, ESOPHAGITIS PRESENCE NOT SPECIFIED: ICD-10-CM

## 2020-09-15 DIAGNOSIS — J45.20 MILD INTERMITTENT ASTHMA WITHOUT COMPLICATION: ICD-10-CM

## 2020-09-15 LAB — HBA1C MFR BLD HPLC: 6.3 %

## 2020-09-15 PROCEDURE — 83036 HEMOGLOBIN GLYCOSYLATED A1C: CPT | Performed by: NURSE PRACTITIONER

## 2020-09-15 PROCEDURE — 99214 OFFICE O/P EST MOD 30 MIN: CPT | Performed by: NURSE PRACTITIONER

## 2020-09-15 RX ORDER — MIRABEGRON 25 MG/1
TABLET, FILM COATED, EXTENDED RELEASE ORAL
COMMUNITY
Start: 2020-09-03 | End: 2022-01-26

## 2020-09-15 RX ORDER — TRAZODONE HYDROCHLORIDE 50 MG/1
150 TABLET ORAL
Qty: 270 TAB | Refills: 1 | Status: SHIPPED | OUTPATIENT
Start: 2020-09-15 | End: 2021-02-11 | Stop reason: SDUPTHER

## 2020-09-15 RX ORDER — DULOXETIN HYDROCHLORIDE 30 MG/1
30 CAPSULE, DELAYED RELEASE ORAL DAILY
Qty: 90 CAP | Refills: 1 | Status: SHIPPED | OUTPATIENT
Start: 2020-09-15 | End: 2021-02-11 | Stop reason: SDUPTHER

## 2020-09-15 RX ORDER — LISINOPRIL 10 MG/1
10 TABLET ORAL DAILY
Qty: 90 TAB | Refills: 1 | Status: SHIPPED | OUTPATIENT
Start: 2020-09-15 | End: 2020-12-17

## 2020-09-15 RX ORDER — TRAZODONE HYDROCHLORIDE 50 MG/1
150 TABLET ORAL
Qty: 90 TAB | Refills: 0 | Status: SHIPPED | OUTPATIENT
Start: 2020-09-15 | End: 2020-09-15 | Stop reason: SDUPTHER

## 2020-09-15 RX ORDER — TRAZODONE HYDROCHLORIDE 50 MG/1
150 TABLET ORAL
Qty: 270 TAB | Refills: 0 | OUTPATIENT
Start: 2020-09-15

## 2020-09-15 RX ORDER — GABAPENTIN 600 MG/1
1200 TABLET ORAL 3 TIMES DAILY
Qty: 180 TAB | Refills: 0 | Status: SHIPPED | OUTPATIENT
Start: 2020-09-15 | End: 2020-10-27 | Stop reason: SDUPTHER

## 2020-09-15 RX ORDER — PANCRELIPASE 36000; 180000; 114000 [USP'U]/1; [USP'U]/1; [USP'U]/1
CAPSULE, DELAYED RELEASE PELLETS ORAL
COMMUNITY
Start: 2020-09-14

## 2020-09-15 RX ORDER — DULOXETIN HYDROCHLORIDE 60 MG/1
60 CAPSULE, DELAYED RELEASE ORAL DAILY
Qty: 90 CAP | Refills: 1 | Status: SHIPPED | OUTPATIENT
Start: 2020-09-15 | End: 2021-02-11 | Stop reason: SDUPTHER

## 2020-09-15 NOTE — PATIENT INSTRUCTIONS
Starting a Weight Loss Plan: Care Instructions Your Care Instructions If you are thinking about losing weight, it can be hard to know where to start. Your doctor can help you set up a weight loss plan that best meets your needs. You may want to take a class on nutrition or exercise, or join a weight loss support group. If you have questions about how to make changes to your eating or exercise habits, ask your doctor about seeing a registered dietitian or an exercise specialist. 
It can be a big challenge to lose weight. But you do not have to make huge changes at once. Make small changes, and stick with them. When those changes become habit, add a few more changes. If you do not think you are ready to make changes right now, try to pick a date in the future. Make an appointment to see your doctor to discuss whether the time is right for you to start a plan. Follow-up care is a key part of your treatment and safety. Be sure to make and go to all appointments, and call your doctor if you are having problems. It's also a good idea to know your test results and keep a list of the medicines you take. How can you care for yourself at home? · Set realistic goals. Many people expect to lose much more weight than is likely. A weight loss of 5% to 10% of your body weight may be enough to improve your health. · Get family and friends involved to provide support. Talk to them about why you are trying to lose weight, and ask them to help. They can help by participating in exercise and having meals with you, even if they may be eating something different. · Find what works best for you. If you do not have time or do not like to cook, a program that offers meal replacement bars or shakes may be better for you. Or if you like to prepare meals, finding a plan that includes daily menus and recipes may be best. 
· Ask your doctor about other health professionals who can help you achieve your weight loss goals. ? A dietitian can help you make healthy changes in your diet. ? An exercise specialist or  can help you develop a safe and effective exercise program. 
? A counselor or psychiatrist can help you cope with issues such as depression, anxiety, or family problems that can make it hard to focus on weight loss. · Consider joining a support group for people who are trying to lose weight. Your doctor can suggest groups in your area. Where can you learn more? Go to http://greg-khalida.info/ Enter P511 in the search box to learn more about \"Starting a Weight Loss Plan: Care Instructions. \" Current as of: December 11, 2019               Content Version: 12.6 © 3438-0499 [employer]. Care instructions adapted under license by [employer] (which disclaims liability or warranty for this information). If you have questions about a medical condition or this instruction, always ask your healthcare professional. [address] any warranty or liability for your use of this information.

## 2020-09-15 NOTE — PROGRESS NOTES
[facility] [place] Note  Subjective:      Santos Whitley is a 39 y.o. female who presents for follow-up. Chief Complaint   Patient presents with    Medication Refill    Weight Management    Medication Evaluation     Add Iris CHEW      Mental Health and Chronic pain   History of anxiety, insomnia, fibromyalgia. Denies depressed mood. Anxiety was elevated recently which included daily worry, trouble focusing, crying often. Current therapy: Cymbalta 60 mg daily, Trazodone 150 mg nightly. Buspar 7.5 mg TID. No side effects to therapy. Not in counseling was referred on 7/21. History of chronic pain secondary to fibromyalgia. Pain is mostly of the back. Reports history of arthritis of the back and neck as well. She is palak to due ADL's. Treatments: Gabapentin 600 mg TID, SNRI's as above, muscle relaxant PRN which she is taking BID. She was referred to PM&R with [facility] for further evaluation and treatment for continued pain but missed appointment - she will call to reschedule. Cardiovascular Review:  The patient has hypertension, hyperlipidemia, obesity, DM. A1c 6.3% today. Diet and Lifestyle: not attempting to follow a low fat, low cholesterol diet, does not rigorously follow a diabetic diet, sedentary, nonsmoker. BG monitoring: None. Home BP Monitoring: is well controlled at home  Pertinent ROS: taking medications as instructed, no medication side effects noted, no TIA's, no chest pain on exertion, no dyspnea on exertion, no swelling of ankles, no palpitations. Had EGD and colonoscopy recently. Had stomach ulcer, symptoms resolved. Now on creon which has been helpful for diarrhea. Current Outpatient Medications   Medication Sig Dispense Refill    Creon 36,000-114,000- 180,000 unit cpDR capsule       Myrbetriq 25 mg ER tablet       lisinopriL (PRINIVIL, ZESTRIL) 10 mg tablet Take 1 Tab by mouth daily.  90 Tab 1    DULoxetine (Cymbalta) 60 mg capsule Take 1 Cap by mouth daily. Total daily dose 90 mg 90 Cap 1    traZODone (DESYREL) 50 mg tablet Take 3 Tabs by mouth nightly. Indications: insomnia associated with depression 270 Tab 1    gabapentin (NEURONTIN) 600 mg tablet Take 2 Tabs by mouth three (3) times daily. Max Daily Amount: 3,600 mg. 180 Tab 0    DULoxetine (CYMBALTA) 30 mg capsule Take 1 Cap by mouth daily. Total daily dose 90 mg. Indications: disorder characterized by stiff, tender & painful muscles 90 Cap 1    butalbital-acetaminophen-caffeine (FIORICET, ESGIC) -40 mg per tablet TAKE ONE TABLET BY MOUTH EVERY 12 HOURS AS NEEDED FOR HEADACHE 30 Tab 0    cyclobenzaprine (FLEXERIL) 5 mg tablet TAKE ONE TABLET BY MOUTH EVERY MORNING AND TAKE TWO TABLETS BY MOUTH EVERY NIGHT AT BEDTIME 90 Tab 0    albuterol (PROVENTIL HFA, VENTOLIN HFA, PROAIR HFA) 90 mcg/actuation inhaler Take 2 Puffs by inhalation every six (6) hours as needed for Wheezing or Cough. 1 Inhaler 0    omeprazole (PRILOSEC) 40 mg capsule Take 1 Cap by mouth daily. 90 Cap 0    busPIRone (BUSPAR) 7.5 mg tablet Take 1 Tab by mouth three (3) times daily. 90 Tab 1    hydroCHLOROthiazide (HYDRODIURIL) 25 mg tablet Take 25 mg by mouth every evening.  diphenhydrAMINE (Benadryl Allergy) 25 mg tablet Take 75 mg by mouth nightly.  metFORMIN (GLUCOPHAGE) 500 mg tablet Take 2 Tabs by mouth two (2) times daily (with meals). 360 Tab 1    Blood-Glucose Meter (OneTouch Verio Meter) misc CHECK BLOOD GLUCOSE TWICE A DAY 1 Each 0    glucose blood VI test strips (OneTouch Verio test strips) strip CHECK BLOOD GLUCOSE TWICE A  Strip 1    ondansetron (ZOFRAN ODT) 4 mg disintegrating tablet Take 1 Tab by mouth every eight (8) hours as needed for Nausea. 12 Tab 0    topiramate (TOPAMAX) 100 mg tablet Take 1 Tab by mouth two (2) times a day.  60 Tab 3     Allergies   Allergen Reactions    Iodinated Contrast Media Hives    Iodine And Iodide Containing Products Hives    Morphine Hives    Naprosyn [Naproxen] Hives    Nsaids (Non-Steroidal Anti-Inflammatory Drug) Swelling     Swelling all over.  Other Plant, Animal, Environmental Other (comments)     A medication given for vertigo caused headaches. Unsure of name. ROS:   Complete review of systems was reviewed with pertinent information listed in HPI. Review of Systems   Constitutional: Negative for chills, diaphoresis, fever, malaise/fatigue and weight loss. HENT: Negative for congestion, ear pain, hearing loss, sinus pain, sore throat and tinnitus. Eyes: Negative for blurred vision and double vision. Respiratory: Negative for shortness of breath. Cardiovascular: Negative for chest pain, palpitations and leg swelling. Gastrointestinal: Negative for abdominal pain, blood in stool, constipation, diarrhea, heartburn, melena, nausea and vomiting. Genitourinary: Negative for dysuria, flank pain, frequency, hematuria and urgency. Musculoskeletal: Positive for back pain. Negative for joint pain and myalgias. Skin: Negative for itching and rash. Neurological: Negative for dizziness, tingling, weakness and headaches. Endo/Heme/Allergies: Negative for polydipsia. Psychiatric/Behavioral: Negative for depression, hallucinations, memory loss, substance abuse and suicidal ideas. The patient is not nervous/anxious and does not have insomnia. Objective:     Visit Vitals  /78 (BP 1 Location: Left arm, BP Patient Position: Sitting)   Pulse (!) 101   Temp 98.8 °F (37.1 °C) (Oral)   Resp 18   Ht 5' 5\" (1.651 m)   Wt 241 lb 9.6 oz (109.6 kg)   LMP  (LMP Unknown)   SpO2 96%   BMI 40.20 kg/m²       Vitals and Nurse Documentation reviewed. Physical Exam  Vitals signs and nursing note reviewed. Constitutional:       General: She is not in acute distress. Appearance: Normal appearance. She is well-developed and well-groomed. She is obese. She is not ill-appearing, toxic-appearing or diaphoretic.    HENT:      Head: Normocephalic and atraumatic. Right Ear: Hearing normal.      Left Ear: Hearing normal.      Nose: No nasal deformity. Eyes:      General: Lids are normal.      Extraocular Movements: Extraocular movements intact. Conjunctiva/sclera: Conjunctivae normal.      Pupils: Pupils are equal, round, and reactive to light. Neck:      Musculoskeletal: Full passive range of motion without pain and neck supple. Cardiovascular:      Rate and Rhythm: Regular rhythm. Tachycardia present. Pulses: Normal pulses. Heart sounds: Normal heart sounds, S1 normal and S2 normal. No murmur. No friction rub. No gallop. Pulmonary:      Effort: Pulmonary effort is normal. No respiratory distress. Breath sounds: Normal breath sounds. No decreased breath sounds, wheezing, rhonchi or rales. Abdominal:      General: Bowel sounds are normal.      Palpations: Abdomen is soft. Tenderness: There is no abdominal tenderness. Musculoskeletal: Normal range of motion. General: No deformity. Cervical back: She exhibits tenderness. She exhibits normal range of motion, no bony tenderness, no swelling, no edema, no deformity and no laceration. Thoracic back: She exhibits tenderness. She exhibits normal range of motion, no bony tenderness, no swelling, no edema, no deformity and no laceration. Lumbar back: She exhibits tenderness. She exhibits normal range of motion, no bony tenderness, no swelling, no edema, no deformity and no spasm. Right lower leg: No edema. Left lower leg: No edema. Comments: Generalized tenderness to all areas of upper, mid and lower back, with light and deep palpation. Negative straight leg raise bilaterally. No saddle anesthesia. Lymphadenopathy:      Cervical: No cervical adenopathy. Skin:     General: Skin is warm and dry. Neurological:      Mental Status: She is alert and oriented to person, place, and time.       Cranial Nerves: Cranial nerves are intact. Sensory: Sensation is intact. Gait: Gait is intact. Gait normal.      Deep Tendon Reflexes: Reflexes are normal and symmetric. Psychiatric:         Attention and Perception: Attention normal.         Mood and Affect: Mood and affect normal.         Speech: Speech normal.         Behavior: Behavior normal. Behavior is cooperative. Thought Content: Thought content normal.         Cognition and Memory: Cognition and memory normal.         Judgment: Judgment normal.       Assessment/Plan:     Diagnoses and all orders for this visit:    1. Diabetes mellitus type 2, noninsulin dependent ([facility] [address]): Stable, A1c at goal. Continue current therapy. Diet, exercise and weight loss reviewed. -     AMB POC HEMOGLOBIN A1C    2. Essential hypertension: Stable, at goal. Continue current therapy. -     lisinopriL (PRINIVIL, ZESTRIL) 10 mg tablet; Take 1 Tab by mouth daily. 3. Mild intermittent asthma without complication: Stable, well controlled. continue current therapy. 4. Gastroesophageal reflux disease, esophagitis presence not specified: Stable, controlled. Continue current therapy. 5. Fibromyalgia: Uncontrolled, increase SNRI from 60 to 90 mg daily, advised follow-up with PM&R.   -     DULoxetine (Cymbalta) 60 mg capsule; Take 1 Cap by mouth daily. Total daily dose 90 mg  -     gabapentin (NEURONTIN) 600 mg tablet; Take 2 Tabs by mouth three (3) times daily. Max Daily Amount: 3,600 mg.    7. Insomnia due to other mental disorder: Stable, improved on trazodone. Continue current therapy. \  -     traZODone (DESYREL) 50 mg tablet; Take 3 Tabs by mouth nightly. Indications: insomnia associated with depression    8. Adrenal incidentaloma [facility]): Referral with endocrine pending. 9. DEANN (generalized anxiety disorder): Stable, improved but not well controlled. Increase SNRI. Advised CBT. -     DULoxetine (Cymbalta) 60 mg capsule; Take 1 Cap by mouth daily.  Total daily dose 90 mg  - traZODone (DESYREL) 50 mg tablet; Take 3 Tabs by mouth nightly. Indications: insomnia associated with depression    10. Class 3 severe obesity due to excess calories with body mass index (BMI) of 40.0 to 44.9 in adult, unspecified whether serious comorbidity present [facility]): I have reviewed/discussed the above normal BMI with the patient. I have recommended the following interventions: dietary management education, guidance, and counseling, encourage exercise, monitor weight and prescribed dietary intake. Given written instructions as well. Advised formal weight loss program - Weight watchers. Consider GLP-1 therapy.   -     REFERRAL TO DIETITIAN    Other orders  -     DULoxetine (CYMBALTA) 30 mg capsule; Take 1 Cap by mouth daily. Total daily dose 90 mg. Indications: disorder characterized by stiff, tender & painful muscles        Follow-up and Dispositions    · Return in about 4 weeks (around 10/13/2020) for Follow-up, weight check. Discussed expected course/resolution/complications of diagnosis in detail with patient.    Medication risks/benefits/costs/interactions/alternatives discussed with patient.    Pt was given an after visit summary which includes diagnoses, current medications & vitals.    Pt expressed understanding with the diagnosis and plan

## 2020-09-15 NOTE — PROGRESS NOTES
Chief Complaint   Patient presents with    Medication Refill    Weight Management    Medication Evaluation     Add Iris CHEW      1. Have you been to the ER, urgent care clinic since your last visit? Hospitalized since your last visit? No    2. Have you seen or consulted any other health care providers outside of the [address] [place] [address] since your last visit? Include any pap smears or colon screening.  Yes Where: [facility] 8/2020 for med

## 2020-09-16 NOTE — TELEPHONE ENCOUNTER
----- Message from [facility] pte sent at 9/11/2020  9:26 AM EDT -----  Regarding: RYAN Bran/Telephone  Caller's first and last name: Zully Deshpande with [facility]  Reason for call: Calling to check the status of medical records  Callback required yes/no and why: yes  Best contact number(s): phone [phone]  Details to clarify the request: fax [phone]

## 2020-09-25 DIAGNOSIS — F41.1 GAD (GENERALIZED ANXIETY DISORDER): ICD-10-CM

## 2020-09-25 NOTE — TELEPHONE ENCOUNTER
PCP: dAebayo Thapa NP    Last appt: 9/15/2020  Future Appointments   Date Time Provider Lukasz Zapata   9/29/2020  3:20 PM MD DAJA Cunha BS AMB   10/13/2020 10:00 AM Adebayo Thapa NP [facility] BS AMB       Requested Prescriptions     Pending Prescriptions Disp Refills    busPIRone (BUSPAR) 7.5 mg tablet 90 Tab 1     Sig: Take 1 Tab by mouth three (3) times daily.

## 2020-09-27 ENCOUNTER — PATIENT MESSAGE (OUTPATIENT)
Dept: FAMILY MEDICINE CLINIC | Age: 45
End: 2020-09-27

## 2020-09-27 DIAGNOSIS — E55.9 VITAMIN D INSUFFICIENCY: Primary | ICD-10-CM

## 2020-09-29 RX ORDER — CYANOCOBALAMIN (VITAMIN B-12) 500 MCG
800 TABLET ORAL DAILY
Qty: 180 TAB | Refills: 2 | Status: SHIPPED | OUTPATIENT
Start: 2020-09-29 | End: 2022-03-06 | Stop reason: SDUPTHER

## 2020-09-29 RX ORDER — BUSPIRONE HYDROCHLORIDE 7.5 MG/1
7.5 TABLET ORAL 3 TIMES DAILY
Qty: 90 TAB | Refills: 1 | Status: SHIPPED | OUTPATIENT
Start: 2020-09-29 | End: 2020-12-03 | Stop reason: SDUPTHER

## 2020-09-29 NOTE — TELEPHONE ENCOUNTER
From: Dixie Ravi  To: Ernie Law NP  Sent: 9/27/2020 7:00 PM EDT  Subject: Non-Urgent Medical Question    These were taken recently am having blood test taken my gastrointestinal Monday morning, maybe a prescription for vit d??

## 2020-10-02 ENCOUNTER — TRANSCRIBE ORDER (OUTPATIENT)
Dept: SCHEDULING | Age: 45
End: 2020-10-02

## 2020-10-02 DIAGNOSIS — R11.0 NAUSEA: Primary | ICD-10-CM

## 2020-10-08 ENCOUNTER — TRANSCRIBE ORDER (OUTPATIENT)
Dept: SCHEDULING | Age: 45
End: 2020-10-08

## 2020-10-08 DIAGNOSIS — R10.13 EPIGASTRIC PAIN: Primary | ICD-10-CM

## 2020-10-08 RX ORDER — CYCLOBENZAPRINE HCL 5 MG
TABLET ORAL
Qty: 90 TAB | Refills: 0 | Status: SHIPPED | OUTPATIENT
Start: 2020-10-08 | End: 2020-11-02

## 2020-10-13 ENCOUNTER — OFFICE VISIT (OUTPATIENT)
Dept: FAMILY MEDICINE CLINIC | Age: 45
End: 2020-10-13
Payer: MEDICAID

## 2020-10-13 ENCOUNTER — PATIENT MESSAGE (OUTPATIENT)
Dept: FAMILY MEDICINE CLINIC | Age: 45
End: 2020-10-13

## 2020-10-13 VITALS
WEIGHT: 235 LBS | OXYGEN SATURATION: 97 % | BODY MASS INDEX: 35.61 KG/M2 | DIASTOLIC BLOOD PRESSURE: 77 MMHG | HEART RATE: 98 BPM | RESPIRATION RATE: 18 BRPM | HEIGHT: 68 IN | TEMPERATURE: 98.2 F | SYSTOLIC BLOOD PRESSURE: 117 MMHG

## 2020-10-13 DIAGNOSIS — E66.01 SEVERE OBESITY (HCC): Primary | ICD-10-CM

## 2020-10-13 DIAGNOSIS — G89.29 CHRONIC BACK PAIN, UNSPECIFIED BACK LOCATION, UNSPECIFIED BACK PAIN LATERALITY: ICD-10-CM

## 2020-10-13 DIAGNOSIS — M54.9 CHRONIC BACK PAIN, UNSPECIFIED BACK LOCATION, UNSPECIFIED BACK PAIN LATERALITY: ICD-10-CM

## 2020-10-13 DIAGNOSIS — E11.9 DIABETES MELLITUS TYPE 2, NONINSULIN DEPENDENT (HCC): Primary | ICD-10-CM

## 2020-10-13 DIAGNOSIS — E11.9 DIABETES MELLITUS TYPE 2, NONINSULIN DEPENDENT (HCC): ICD-10-CM

## 2020-10-13 DIAGNOSIS — I10 ESSENTIAL HYPERTENSION: ICD-10-CM

## 2020-10-13 DIAGNOSIS — M79.7 FIBROMYALGIA: ICD-10-CM

## 2020-10-13 PROCEDURE — 99214 OFFICE O/P EST MOD 30 MIN: CPT | Performed by: NURSE PRACTITIONER

## 2020-10-13 RX ORDER — METFORMIN HYDROCHLORIDE 500 MG/1
TABLET ORAL
Qty: 360 TAB | Refills: 0
Start: 2020-10-13 | End: 2020-11-10 | Stop reason: SDUPTHER

## 2020-10-13 NOTE — PROGRESS NOTES
Chief Complaint   Patient presents with    Follow-up     Weight, and blood sugar   1. Have you been to the ER, urgent care clinic since your last visit? Hospitalized since your last visit? NO    2. Have you seen or consulted any other health care providers outside of the [address] [place] [address] since your last visit? Include any pap smears or colon screening.   NO

## 2020-10-13 NOTE — PROGRESS NOTES
[facility] [facility] Note  Subjective:      Damon Davidson is a 39 y.o. female who presents for follow-up. Chief Complaint   Patient presents with    Follow-up     Weight, and blood sugar     Cardiovascular Review:  The patient has hypertension, hyperlipidemia, obesity, DM. A1c 6.3% on 9/15/2020. Diet and Lifestyle:   Started weight watchers since last month, portion sizes have decreased, rarely goes over her daily points. She is down 6 pounds since starting. She has started to walk more frequently which was flaring back pain - see below. BG monitoring: in 's- 230's    Home BP Monitoring: is well controlled at home  Pertinent ROS: taking medications as instructed, no medication side effects noted, no TIA's, no chest pain on exertion, no dyspnea on exertion, no swelling of ankles, no palpitations.     Mental Health and Chronic pain   History of anxiety, insomnia, fibromyalgia. Denies depressed mood. Anxiety was elevated recently which included daily worry, trouble focusing, crying often. Current therapy: Cymbalta 60 mg daily, Trazodone 150 mg nightly. Buspar 7.5 mg TID. No side effects to therapy. Not in counseling was referred on 7/21/2020. History of chronic pain secondary to fibromyalgia. Pain is mostly of the back. Reports history of arthritis of the back and neck as well. She is able to due ADL's but has limitations with exercise including prolonged walking. Treatments: Gabapentin 600 mg TID, SNRI's as above, muscle relaxant PRN which she is taking BID. She was referred to PM&R with [facility] for further evaluation and treatment for continued pain but missed appointment - new apt on 10/21/2020.      Had EGD and colonoscopy recently. Had stomach ulcer, symptoms resolved. Now on creon which has been helpful for diarrhea. She is requesting victoza for DM and weight loss.      Current Outpatient Medications   Medication Sig Dispense Refill    liraglutide (VICTOZA) 0.6 mg/0.1 mL (18 mg/3 mL) pnij 0.6 mg subcutaneous once daily for 1 week, then increase to 1.2 mg once daily 4 Each 0    metFORMIN (GLUCOPHAGE) 500 mg tablet 2 tablets PO in AM and 1 tablet in  Tab 0    cyclobenzaprine (FLEXERIL) 5 mg tablet TAKE ONE TABLET BY MOUTH EVERY MORNING AND TAKE TWO TABLETS BY MOUTH EVERY NIGHT AT BEDTIME 90 Tab 0    busPIRone (BUSPAR) 7.5 mg tablet Take 1 Tab by mouth three (3) times daily. 90 Tab 1    cholecalciferol (VITAMIN D3) (400 Units /10 mcg) tab tablet Take 2 Tabs by mouth daily. Indications: prevention of vitamin D deficiency 180 Tab 2    Creon 36,000-114,000- 180,000 unit cpDR capsule       Myrbetriq 25 mg ER tablet       lisinopriL (PRINIVIL, ZESTRIL) 10 mg tablet Take 1 Tab by mouth daily. 90 Tab 1    DULoxetine (Cymbalta) 60 mg capsule Take 1 Cap by mouth daily. Total daily dose 90 mg 90 Cap 1    traZODone (DESYREL) 50 mg tablet Take 3 Tabs by mouth nightly. Indications: insomnia associated with depression 270 Tab 1    gabapentin (NEURONTIN) 600 mg tablet Take 2 Tabs by mouth three (3) times daily. Max Daily Amount: 3,600 mg. 180 Tab 0    DULoxetine (CYMBALTA) 30 mg capsule Take 1 Cap by mouth daily. Total daily dose 90 mg. Indications: disorder characterized by stiff, tender & painful muscles 90 Cap 1    albuterol (PROVENTIL HFA, VENTOLIN HFA, PROAIR HFA) 90 mcg/actuation inhaler Take 2 Puffs by inhalation every six (6) hours as needed for Wheezing or Cough. 1 Inhaler 0    omeprazole (PRILOSEC) 40 mg capsule Take 1 Cap by mouth daily. 90 Cap 0    ondansetron (ZOFRAN ODT) 4 mg disintegrating tablet Take 1 Tab by mouth every eight (8) hours as needed for Nausea. 12 Tab 0    hydroCHLOROthiazide (HYDRODIURIL) 25 mg tablet Take 25 mg by mouth every evening.  diphenhydrAMINE (Benadryl Allergy) 25 mg tablet Take 75 mg by mouth nightly.       Blood-Glucose Meter (OneTouch Verio Meter) misc CHECK BLOOD GLUCOSE TWICE A DAY 1 Each 0    glucose blood VI test strips (OneTouch Verio test strips) strip CHECK BLOOD GLUCOSE TWICE A  Strip 1    Insulin Needles, Disposable, 30 gauge x 1/3\" Use daily with victoza 1 Package 11    butalbital-acetaminophen-caffeine (FIORICET, ESGIC) -40 mg per tablet TAKE ONE TABLET BY MOUTH EVERY 12 HOURS AS NEEDED FOR HEADACHE 30 Tab 0    topiramate (TOPAMAX) 100 mg tablet Take 1 Tab by mouth two (2) times a day. 60 Tab 3     Allergies   Allergen Reactions    Diatrizoate Sodium Hives    Iodinated Contrast Media Hives    Iodine And Iodide Containing Products Hives    Morphine Hives    Naprosyn [Naproxen] Hives    Nsaids (Non-Steroidal Anti-Inflammatory Drug) Swelling     Swelling all over.  Other Plant, Animal, Environmental Other (comments)     A medication given for vertigo caused headaches. Unsure of name. ROS:   Complete review of systems was reviewed with pertinent information listed in HPI. Review of Systems   Constitutional: Negative for chills, diaphoresis, fever, malaise/fatigue and weight loss. HENT: Negative for congestion, ear pain, hearing loss, sinus pain, sore throat and tinnitus. Eyes: Negative for blurred vision and double vision. Respiratory: Negative for shortness of breath. Cardiovascular: Negative for chest pain, palpitations and leg swelling. Gastrointestinal: Negative for abdominal pain, blood in stool, constipation, diarrhea, heartburn, melena, nausea and vomiting. Genitourinary: Negative for dysuria, frequency, hematuria and urgency. Musculoskeletal: Positive for back pain. Negative for joint pain and myalgias. Skin: Negative for itching and rash. Neurological: Negative for dizziness, tingling, weakness and headaches. Endo/Heme/Allergies: Negative for polydipsia. Psychiatric/Behavioral: Negative.         Objective:     Visit Vitals  /77 (BP 1 Location: Right arm, BP Patient Position: Sitting)   Pulse 98   Temp 98.2 °F (36.8 °C) (Temporal)   Resp 18   Ht 5' 8\" (1.727 m)   Wt 235 lb (106.6 kg)   LMP  (LMP Unknown)   SpO2 97%   BMI 35.73 kg/m²       Vitals and Nurse Documentation reviewed. Physical Exam  Vitals signs and nursing note reviewed. Constitutional:       General: She is not in acute distress. Appearance: She is obese. She is not ill-appearing or diaphoretic. HENT:      Head: Normocephalic and atraumatic. Cardiovascular:      Rate and Rhythm: Normal rate and regular rhythm. Pulses: Normal pulses. Heart sounds: Normal heart sounds, S1 normal and S2 normal. No murmur. No friction rub. No gallop. Pulmonary:      Effort: Pulmonary effort is normal. No respiratory distress. Breath sounds: Normal breath sounds. No wheezing or rales. Chest:      Chest wall: No tenderness. Abdominal:      General: Bowel sounds are normal.      Palpations: Abdomen is soft. Tenderness: There is no abdominal tenderness. There is no right CVA tenderness, left CVA tenderness, guarding or rebound. Musculoskeletal:      Right lower leg: No edema. Left lower leg: No edema. Skin:     General: Skin is warm and dry. Capillary Refill: Capillary refill takes less than 2 seconds. Neurological:      Mental Status: She is alert and oriented to person, place, and time. Gait: Gait is intact. Psychiatric:         Mood and Affect: Mood and affect normal.         Behavior: Behavior normal.         Thought Content: Thought content normal.         Cognition and Memory: Memory normal.         Judgment: Judgment normal.         Assessment/Plan:     Diagnoses and all orders for this visit:    1. Severe obesity ([address]): 6 pound weight loss in 4 week since starting Weight watchers and routine exercise. She again today is requesting victoza for DM and weight loss. We reviewed in detail possible side effects include GI side effects to monitor for and she wishes to proceed with therapy. 4 week follow-up for med and weight check.    -     liraglutide ([address]) 0.6 mg/0.1 mL (18 mg/3 mL) pnij; 0.6 mg subcutaneous once daily for 1 week, then increase to 1.2 mg once daily    2. Diabetes mellitus type 2, noninsulin dependent ([address]): Stable, A1c at goal. Start enedelia for weight reduction. May consider decreasing metformin if needed. 4 week follow-up. -     liraglutide (VICTOZA) 0.6 mg/0.1 mL (18 mg/3 mL) pnij; 0.6 mg subcutaneous once daily for 1 week, then increase to 1.2 mg once daily  -     metFORMIN (GLUCOPHAGE) 500 mg tablet; 2 tablets PO in AM and 1 tablet in PM    3. Essential hypertension: Stable, at goal. Continue current therapy. 4. Fibromyalgia: Stable, Continue current therapy. Follow-up with PM&R     5. Chronic back pain, unspecified back location, unspecified back pain laterality:  Stable, Continue current therapy. Follow-up with PM&R     Follow-up and Dispositions    · Return in about 4 weeks (around 11/10/2020).        Discussed expected course/resolution/complications of diagnosis in detail with patient.    Medication risks/benefits/costs/interactions/alternatives discussed with patient.    Pt was given an after visit summary which includes diagnoses, current medications & vitals.    Pt expressed understanding with the diagnosis and plan

## 2020-10-14 DIAGNOSIS — G43.001 MIGRAINE WITHOUT AURA AND WITH STATUS MIGRAINOSUS, NOT INTRACTABLE: ICD-10-CM

## 2020-10-14 RX ORDER — BUTALBITAL, ACETAMINOPHEN AND CAFFEINE 50; 325; 40 MG/1; MG/1; MG/1
TABLET ORAL
Qty: 30 TAB | Refills: 0 | Status: SHIPPED | OUTPATIENT
Start: 2020-10-14 | End: 2020-11-02

## 2020-10-14 NOTE — TELEPHONE ENCOUNTER
From: Bisi Pabon  To: Alma Rosa Cavanaugh NP  Sent: 10/13/2020 7:06 PM EDT  Subject: Prescription Question    I need pen tips for my victoza the pharmacy won't give without prescription from you

## 2020-10-15 ENCOUNTER — TELEPHONE (OUTPATIENT)
Dept: FAMILY MEDICINE CLINIC | Age: 45
End: 2020-10-15

## 2020-10-15 DIAGNOSIS — E66.01 SEVERE OBESITY (HCC): ICD-10-CM

## 2020-10-15 DIAGNOSIS — G93.2 IIH (IDIOPATHIC INTRACRANIAL HYPERTENSION): Primary | ICD-10-CM

## 2020-10-15 DIAGNOSIS — E11.9 DIABETES MELLITUS TYPE 2, NONINSULIN DEPENDENT (HCC): Primary | ICD-10-CM

## 2020-10-15 NOTE — TELEPHONE ENCOUNTER
Pharmacy called stating they don't have 30 gauge Insulin Needles, Disposable, 30 gauge x 1/3\"  Pharmacy wants to know if we can switch it to [address] [place] [address] has been trying to reach us for the past couple of days     BCB# [phone]

## 2020-10-20 DIAGNOSIS — E11.9 DIABETES MELLITUS TYPE 2, NONINSULIN DEPENDENT (HCC): ICD-10-CM

## 2020-10-20 RX ORDER — PEN NEEDLE, DIABETIC 30 GX3/16"
NEEDLE, DISPOSABLE MISCELLANEOUS
Qty: 1 PACKAGE | Refills: 11 | Status: SHIPPED | OUTPATIENT
Start: 2020-10-20 | End: 2022-02-01 | Stop reason: SDUPTHER

## 2020-10-20 NOTE — TELEPHONE ENCOUNTER
Last Visit: 10/13/20  RYAN Kang  Next Appointment: 11/11/20  RYAN Kang  Previous Refill Encounter(s): 7/21/20  Historical provider    *request for metformin also but already sent on 10/13/20    Requested Prescriptions     Pending Prescriptions Disp Refills    hydroCHLOROthiazide (HYDRODIURIL) 25 mg tablet 90 Tab 1     Sig: Take 1 Tab by mouth every evening.

## 2020-10-21 RX ORDER — HYDROCHLOROTHIAZIDE 25 MG/1
25 TABLET ORAL EVERY EVENING
Qty: 90 TAB | Refills: 1 | Status: SHIPPED | OUTPATIENT
Start: 2020-10-21 | End: 2021-01-05

## 2020-10-26 ENCOUNTER — E-VISIT (OUTPATIENT)
Dept: FAMILY MEDICINE CLINIC | Age: 45
End: 2020-10-26

## 2020-10-26 ENCOUNTER — PATIENT MESSAGE (OUTPATIENT)
Dept: FAMILY MEDICINE CLINIC | Age: 45
End: 2020-10-26

## 2020-10-26 DIAGNOSIS — M79.7 FIBROMYALGIA: ICD-10-CM

## 2020-10-26 DIAGNOSIS — G89.29 CHRONIC BACK PAIN, UNSPECIFIED BACK LOCATION, UNSPECIFIED BACK PAIN LATERALITY: Primary | ICD-10-CM

## 2020-10-26 DIAGNOSIS — M54.9 CHRONIC BACK PAIN, UNSPECIFIED BACK LOCATION, UNSPECIFIED BACK PAIN LATERALITY: Primary | ICD-10-CM

## 2020-10-26 DIAGNOSIS — M51.36 DEGENERATIVE DISC DISEASE, LUMBAR: ICD-10-CM

## 2020-10-27 RX ORDER — GABAPENTIN 600 MG/1
1200 TABLET ORAL 3 TIMES DAILY
Qty: 180 TAB | Refills: 0 | Status: SHIPPED | OUTPATIENT
Start: 2020-10-27 | End: 2020-11-11 | Stop reason: SDUPTHER

## 2020-10-27 NOTE — TELEPHONE ENCOUNTER
From: Ibrahima Rashid To: Grazyna Herrera NP Sent: 10/26/2020 6:50 PM EDT Subject: Prescription Question I have been trying to get my Gabapentin refilled since Thursday, I sent several messages thru here, the refill option and thru the pharmacy I even called the office. I am currently out of my medication and am stuck in the bed because my nerve pain is so bad. Can we please refill this ASAP?!?!?! 
THANK YOU Ibrahima Rashid

## 2020-11-01 DIAGNOSIS — G43.001 MIGRAINE WITHOUT AURA AND WITH STATUS MIGRAINOSUS, NOT INTRACTABLE: ICD-10-CM

## 2020-11-02 RX ORDER — CYCLOBENZAPRINE HCL 5 MG
TABLET ORAL
Qty: 90 TAB | Refills: 0 | Status: SHIPPED | OUTPATIENT
Start: 2020-11-02 | End: 2020-11-30

## 2020-11-02 RX ORDER — BUTALBITAL, ACETAMINOPHEN AND CAFFEINE 50; 325; 40 MG/1; MG/1; MG/1
TABLET ORAL
Qty: 30 TAB | Refills: 0 | Status: SHIPPED | OUTPATIENT
Start: 2020-11-02 | End: 2020-11-30

## 2020-11-02 NOTE — TELEPHONE ENCOUNTER
Last visit E-visit NP Loida Crobin   Next appointment 11/11/2020 RYAN Corbin   Previous refill encounter(s)   Pro-Air INH HFA #1     Requested Prescriptions     Pending Prescriptions Disp Refills    albuterol (PROVENTIL HFA, VENTOLIN HFA, PROAIR HFA) 90 mcg/actuation inhaler 1 Inhaler 0     Sig: Take 2 Puffs by inhalation every six (6) hours as needed for Wheezing or Cough.

## 2020-11-03 RX ORDER — ALBUTEROL SULFATE 90 UG/1
2 AEROSOL, METERED RESPIRATORY (INHALATION)
Qty: 1 INHALER | Refills: 0 | Status: SHIPPED | OUTPATIENT
Start: 2020-11-03 | End: 2021-01-06 | Stop reason: SDUPTHER

## 2020-11-10 ENCOUNTER — PATIENT MESSAGE (OUTPATIENT)
Dept: FAMILY MEDICINE CLINIC | Age: 45
End: 2020-11-10

## 2020-11-10 DIAGNOSIS — E11.9 DIABETES MELLITUS TYPE 2, NONINSULIN DEPENDENT (HCC): ICD-10-CM

## 2020-11-10 DIAGNOSIS — Z91.09 ENVIRONMENTAL ALLERGIES: Primary | ICD-10-CM

## 2020-11-10 NOTE — TELEPHONE ENCOUNTER
NP Shirley Werner,    Rx sent 10/13/20 but was class: No Print. Updated to normal.  Please resend.   ThanksKatia    Previous Refill Encounter(s): 10/13/20  360    Requested Prescriptions     Pending Prescriptions Disp Refills    metFORMIN (GLUCOPHAGE) 500 mg tablet 360 Tab 0     Si tablets PO in AM and 1 tablet in PM

## 2020-11-11 ENCOUNTER — OFFICE VISIT (OUTPATIENT)
Dept: FAMILY MEDICINE CLINIC | Age: 45
End: 2020-11-11
Payer: MEDICAID

## 2020-11-11 VITALS
DIASTOLIC BLOOD PRESSURE: 70 MMHG | WEIGHT: 230.6 LBS | BODY MASS INDEX: 34.95 KG/M2 | TEMPERATURE: 97.3 F | SYSTOLIC BLOOD PRESSURE: 100 MMHG | HEIGHT: 68 IN | HEART RATE: 102 BPM | OXYGEN SATURATION: 95 % | RESPIRATION RATE: 18 BRPM

## 2020-11-11 DIAGNOSIS — M79.7 FIBROMYALGIA: ICD-10-CM

## 2020-11-11 DIAGNOSIS — M54.9 CHRONIC BACK PAIN, UNSPECIFIED BACK LOCATION, UNSPECIFIED BACK PAIN LATERALITY: ICD-10-CM

## 2020-11-11 DIAGNOSIS — I10 ESSENTIAL HYPERTENSION: ICD-10-CM

## 2020-11-11 DIAGNOSIS — E66.01 SEVERE OBESITY (HCC): ICD-10-CM

## 2020-11-11 DIAGNOSIS — E11.9 DIABETES MELLITUS TYPE 2, NONINSULIN DEPENDENT (HCC): Primary | ICD-10-CM

## 2020-11-11 DIAGNOSIS — G89.29 CHRONIC BACK PAIN, UNSPECIFIED BACK LOCATION, UNSPECIFIED BACK PAIN LATERALITY: ICD-10-CM

## 2020-11-11 DIAGNOSIS — M51.36 DEGENERATIVE DISC DISEASE, LUMBAR: ICD-10-CM

## 2020-11-11 PROBLEM — E11.40 TYPE 2 DIABETES MELLITUS WITH DIABETIC NEUROPATHY (HCC): Status: ACTIVE | Noted: 2020-11-11

## 2020-11-11 PROCEDURE — 99214 OFFICE O/P EST MOD 30 MIN: CPT | Performed by: NURSE PRACTITIONER

## 2020-11-11 RX ORDER — GABAPENTIN 600 MG/1
1200 TABLET ORAL 3 TIMES DAILY
Qty: 180 TAB | Refills: 1 | Status: SHIPPED | OUTPATIENT
Start: 2020-11-27 | End: 2021-02-08 | Stop reason: ALTCHOICE

## 2020-11-11 NOTE — PROGRESS NOTES
Identified pt with two pt identifiers(name and ). Reviewed record in preparation for visit and have obtained necessary documentation. Chief Complaint   Patient presents with    Weight Management    Diabetes     BS stable        Vitals:    20 0855   Weight: 230 lb 9.6 oz (104.6 kg)   Height: 5' 8\" (1.727 m)   PainSc:   3   PainLoc: Back       Health Maintenance Due   Topic    Pneumococcal 0-64 years (1 of 1 - PPSV23)    Foot Exam Q1     Eye Exam Retinal or Dilated     DTaP/Tdap/Td series (1 - Tdap)    PAP AKA CERVICAL CYTOLOGY        Coordination of Care Questionnaire:  :   1) Have you been to an emergency room, urgent care, or hospitalized since your last visit? If yes, where when, and reason for visit? no       2. Have seen or consulted any other health care provider since your last visit? If yes, where when, and reason for visit? NO      Patient is accompanied by self I have received verbal consent from Roneyghanistmarlyn to discuss any/all medical information while they are present in the room.

## 2020-11-11 NOTE — PROGRESS NOTES
[address] [facility] Note  Subjective:      Santa Houser is a 39 y.o. female who presents for follow-up. Chief Complaint   Patient presents with    Weight Management    Diabetes     BS stable     Cardiovascular Review:  The patient has hypertension, hyperlipidemia, obesity, DM. A1c 6.3% on 9/15/2020.   Diet and Lifestyle: Weight is down 5 pounds in 4 weeks, 11 pounds in the past 8 weeks. Started weight watchers 9/2020, started victoza 10/2020;  portion sizes have decreased, occasionally goes over her daily points. She has started to walk more frequently as tolerated with chronic pain - see below. BG monitoring: in 's    Home BP Monitoring: is well controlled at home - occasional SBP in 170's. Pertinent ROS: taking medications as instructed, no medication side effects noted, no TIA's, no chest pain on exertion, no dyspnea on exertion, no swelling of ankles, no palpitations.      Mental Health and Chronic pain   History of anxiety, insomnia, fibromyalgia. Denies depressed mood. Anxiety is stable as well today. Current therapy: Cymbalta 60 mg daily, Trazodone 150 mg nightly. Buspar 7.5 mg TID. No side effects to therapy.  Not in counseling was referred on 7/21/2020. History of chronic pain secondary to fibromyalgia, DDD of back and neck.  Pain is mostly of the back. She is able to due ADL's but has limitations with exercise including prolonged walking which has improved over the past 1-2 months. Treatments: Gabapentin 600 mg TID, SNRI's as above, muscle relaxant PRN which she is taking BID. She was referred to PM&R with [facility] for further evaluation and treatment for continued pain but missed appointment - new apt is pending.      Current Outpatient Medications   Medication Sig Dispense Refill    liraglutide (VICTOZA) 0.6 mg/0.1 mL (18 mg/3 mL) pnij 1.8 mg subcutaneous once daily for 2 weeks, then increase to 3 mg once daily 4 Each 2    [START ON 11/27/2020] gabapentin (NEURONTIN) 600 mg tablet Take 2 Tabs by mouth three (3) times daily. Max Daily Amount: 3,600 mg. 180 Tab 1    albuterol (PROVENTIL HFA, VENTOLIN HFA, PROAIR HFA) 90 mcg/actuation inhaler Take 2 Puffs by inhalation every six (6) hours as needed for Wheezing or Cough. 1 Inhaler 0    butalbital-acetaminophen-caffeine (FIORICET, ESGIC) -40 mg per tablet TAKE ONE TABLET BY MOUTH EVERY 12 HOURS AS NEEDED FOR HEADACHE 30 Tab 0    cyclobenzaprine (FLEXERIL) 5 mg tablet TAKE ONE TABLET BY MOUTH EVERY MORNING AND TAKE TWO TABLETS BY MOUTH EVERY NIGHT AT BEDTIME 90 Tab 0    hydroCHLOROthiazide (HYDRODIURIL) 25 mg tablet Take 1 Tab by mouth every evening. 90 Tab 1    Insulin Needles, Disposable, 31 gauge x 5/16\" ndle Use one daily. 1 Package 11    metFORMIN (GLUCOPHAGE) 500 mg tablet 2 tablets PO in AM and 1 tablet in  Tab 0    busPIRone (BUSPAR) 7.5 mg tablet Take 1 Tab by mouth three (3) times daily. 90 Tab 1    cholecalciferol (VITAMIN D3) (400 Units /10 mcg) tab tablet Take 2 Tabs by mouth daily. Indications: prevention of vitamin D deficiency 180 Tab 2    Creon 36,000-114,000- 180,000 unit cpDR capsule       Myrbetriq 25 mg ER tablet       lisinopriL (PRINIVIL, ZESTRIL) 10 mg tablet Take 1 Tab by mouth daily. 90 Tab 1    DULoxetine (Cymbalta) 60 mg capsule Take 1 Cap by mouth daily. Total daily dose 90 mg 90 Cap 1    traZODone (DESYREL) 50 mg tablet Take 3 Tabs by mouth nightly. Indications: insomnia associated with depression 270 Tab 1    DULoxetine (CYMBALTA) 30 mg capsule Take 1 Cap by mouth daily. Total daily dose 90 mg. Indications: disorder characterized by stiff, tender & painful muscles 90 Cap 1    omeprazole (PRILOSEC) 40 mg capsule Take 1 Cap by mouth daily.  90 Cap 0    Blood-Glucose Meter (OneTouch Verio Meter) misc CHECK BLOOD GLUCOSE TWICE A DAY 1 Each 0    glucose blood VI test strips (OneTouch Verio test strips) strip CHECK BLOOD GLUCOSE TWICE A  Strip 1    ondansetron (ZOFRAN ODT) 4 mg disintegrating tablet Take 1 Tab by mouth every eight (8) hours as needed for Nausea. 12 Tab 0    diphenhydrAMINE (Benadryl Allergy) 25 mg tablet Take 75 mg by mouth nightly.  topiramate (TOPAMAX) 100 mg tablet Take 1 Tab by mouth two (2) times a day. 60 Tab 3     Allergies   Allergen Reactions    Diatrizoate Sodium Hives    Iodinated Contrast Media Hives    Iodine And Iodide Containing Products Hives    Morphine Hives    Naprosyn [Naproxen] Hives    Nsaids (Non-Steroidal Anti-Inflammatory Drug) Swelling     Swelling all over.  Other Plant, Animal, Environmental Other (comments)     A medication given for vertigo caused headaches. Unsure of name. ROS:   Complete review of systems was reviewed with pertinent information listed in HPI. Review of Systems   Constitutional: Negative for chills, diaphoresis, fever, malaise/fatigue and weight loss. HENT: Negative for congestion, ear pain, hearing loss, sinus pain, sore throat and tinnitus. Eyes: Negative for blurred vision and double vision. Respiratory: Negative for shortness of breath. Cardiovascular: Negative for chest pain, palpitations and leg swelling. Gastrointestinal: Negative for abdominal pain, blood in stool, constipation, diarrhea, heartburn, melena, nausea and vomiting. Genitourinary: Negative for dysuria, frequency, hematuria and urgency. Musculoskeletal: Positive for back pain and myalgias. Negative for falls, joint pain and neck pain. Skin: Negative for itching and rash. Neurological: Negative for dizziness, tingling, weakness and headaches. Endo/Heme/Allergies: Negative for polydipsia. Psychiatric/Behavioral: Negative.         Objective:     Visit Vitals  /70 (BP 1 Location: Right arm, BP Patient Position: Sitting)   Pulse (!) 102   Temp 97.3 °F (36.3 °C) (Temporal)   Resp 18   Ht 5' 8\" (1.727 m)   Wt 230 lb 9.6 oz (104.6 kg)   LMP  (LMP Unknown)   SpO2 95%   BMI 35.06 kg/m² Vitals and Nurse Documentation reviewed. Physical Exam  Vitals signs and nursing note reviewed. Constitutional:       General: She is not in acute distress. Appearance: Normal appearance. She is obese. She is not ill-appearing, toxic-appearing or diaphoretic. HENT:      Head: Normocephalic and atraumatic. Neck:      Musculoskeletal: Normal range of motion and neck supple. Cardiovascular:      Rate and Rhythm: Normal rate and regular rhythm. Heart sounds: Normal heart sounds. No murmur. No friction rub. No gallop. Pulmonary:      Effort: Pulmonary effort is normal. No respiratory distress. Breath sounds: Normal breath sounds. No wheezing or rales. Chest:      Chest wall: No tenderness. Musculoskeletal:      Right lower leg: No edema. Left lower leg: No edema. Skin:     General: Skin is warm and dry. Capillary Refill: Capillary refill takes less than 2 seconds. Neurological:      Mental Status: She is alert and oriented to person, place, and time. Gait: Gait is intact. Psychiatric:         Mood and Affect: Mood and affect normal.         Behavior: Behavior normal.         Thought Content: Thought content normal.         Cognition and Memory: Memory normal.         Judgment: Judgment normal.         Assessment/Plan:     Diagnoses and all orders for this visit:    1. Diabetes mellitus type 2, noninsulin dependent (HCC)  -     liraglutide (VICTOZA) 0.6 mg/0.1 mL (18 mg/3 mL) pnij; 1.8 mg subcutaneous once daily for 2 weeks, then increase to 3 mg once daily    2. Severe obesity (HCC)  -     liraglutide (VICTOZA) 0.6 mg/0.1 mL (18 mg/3 mL) pnij; 1.8 mg subcutaneous once daily for 2 weeks, then increase to 3 mg once daily    3. Fibromyalgia  -     gabapentin (NEURONTIN) 600 mg tablet; Take 2 Tabs by mouth three (3) times daily.  Max Daily Amount: 3,600 mg.    4. Chronic back pain, unspecified back location, unspecified back pain laterality  -     gabapentin (NEURONTIN) 600 mg tablet; Take 2 Tabs by mouth three (3) times daily. Max Daily Amount: 3,600 mg.    5. Degenerative disc disease, lumbar  -     gabapentin (NEURONTIN) 600 mg tablet; Take 2 Tabs by mouth three (3) times daily. Max Daily Amount: 3,600 mg.    6. Essential hypertension      Follow-up and Dispositions    · Return in about 8 weeks (around 1/6/2021) for Follow-up, weight check.          Discussed expected course/resolution/complications of diagnosis in detail with patient.    Medication risks/benefits/costs/interactions/alternatives discussed with patient.    Pt was given an after visit summary which includes diagnoses, current medications & vitals.  Pt expressed understanding with the diagnosis and plan

## 2020-11-12 ENCOUNTER — HOSPITAL ENCOUNTER (OUTPATIENT)
Dept: NUCLEAR MEDICINE | Age: 45
Discharge: HOME | End: 2020-11-12
Attending: INTERNAL MEDICINE
Payer: MEDICAID

## 2020-11-12 DIAGNOSIS — R11.0 NAUSEA: ICD-10-CM

## 2020-11-12 PROCEDURE — 78264 GASTRIC EMPTYING IMG STUDY: CPT

## 2020-11-12 RX ORDER — METFORMIN HYDROCHLORIDE 500 MG/1
TABLET ORAL
Qty: 360 TAB | Refills: 0 | Status: SHIPPED | OUTPATIENT
Start: 2020-11-12 | End: 2022-01-26

## 2020-11-30 DIAGNOSIS — G43.001 MIGRAINE WITHOUT AURA AND WITH STATUS MIGRAINOSUS, NOT INTRACTABLE: ICD-10-CM

## 2020-11-30 RX ORDER — BUTALBITAL, ACETAMINOPHEN AND CAFFEINE 50; 325; 40 MG/1; MG/1; MG/1
TABLET ORAL
Qty: 30 TAB | Refills: 0 | Status: SHIPPED | OUTPATIENT
Start: 2020-11-30 | End: 2020-12-30

## 2020-11-30 RX ORDER — CYCLOBENZAPRINE HCL 5 MG
TABLET ORAL
Qty: 90 TAB | Refills: 0 | Status: SHIPPED | OUTPATIENT
Start: 2020-11-30 | End: 2021-01-04

## 2020-12-03 DIAGNOSIS — F41.1 GAD (GENERALIZED ANXIETY DISORDER): ICD-10-CM

## 2020-12-03 RX ORDER — BUSPIRONE HYDROCHLORIDE 7.5 MG/1
7.5 TABLET ORAL 3 TIMES DAILY
Qty: 90 TAB | Refills: 2 | Status: SHIPPED | OUTPATIENT
Start: 2020-12-03 | End: 2021-02-11 | Stop reason: SDUPTHER

## 2020-12-03 NOTE — TELEPHONE ENCOUNTER
Last Visit: 11/11/20  NP Laury Moreno  Next Appointment: 1/5/21 MD Castellano  Previous Refill Encounter(s): 9/29/20 90 + 1(2 month supply)    Requested Prescriptions     Pending Prescriptions Disp Refills    busPIRone (BUSPAR) 7.5 mg tablet 90 Tab 2     Sig: Take 1 Tab by mouth three (3) times daily.

## 2020-12-09 DIAGNOSIS — R11.0 NAUSEA: ICD-10-CM

## 2020-12-09 RX ORDER — ONDANSETRON 4 MG/1
4 TABLET, ORALLY DISINTEGRATING ORAL
Qty: 12 TAB | Refills: 0 | Status: CANCELLED | OUTPATIENT
Start: 2020-12-09

## 2020-12-10 ENCOUNTER — DOCUMENTATION ONLY (OUTPATIENT)
Dept: NEUROLOGY | Age: 45
End: 2020-12-10

## 2020-12-10 NOTE — PROGRESS NOTES
Ophthalmology records from Dr. Ghanshyam Ambrocio reviewed. On exam there was no identifiable disc edema or papilledema on 12/8/2020. It is possible that past papilledema resulted in RNFL loss making measurable disc edema less likely with increase in ICP. She was noted to have significant visual field defects on automated perimetry and patient was reporting decline in vision. She was advised to go to ED for increased ICP    Findings discussed with patient. Discussed the importance of evaluating the functionality of the shunt and possibly rechecking the opening pressure. This can only be done in hospital setting and she was advised to go to the emergency department.

## 2020-12-11 DIAGNOSIS — G93.2 IDIOPATHIC INTRACRANIAL HYPERTENSION: Primary | ICD-10-CM

## 2020-12-11 RX ORDER — FUROSEMIDE 20 MG/1
20 TABLET ORAL DAILY
Qty: 30 TAB | Refills: 1 | Status: SHIPPED | OUTPATIENT
Start: 2020-12-11 | End: 2021-01-05

## 2020-12-11 RX ORDER — ONDANSETRON 4 MG/1
4 TABLET, ORALLY DISINTEGRATING ORAL
Qty: 30 TAB | Refills: 0 | Status: SHIPPED | OUTPATIENT
Start: 2020-12-11 | End: 2020-12-29 | Stop reason: SDUPTHER

## 2020-12-13 ENCOUNTER — APPOINTMENT (OUTPATIENT)
Dept: GENERAL RADIOLOGY | Age: 45
End: 2020-12-13
Attending: EMERGENCY MEDICINE
Payer: MEDICAID

## 2020-12-13 ENCOUNTER — APPOINTMENT (OUTPATIENT)
Dept: CT IMAGING | Age: 45
End: 2020-12-13
Attending: EMERGENCY MEDICINE
Payer: MEDICAID

## 2020-12-13 ENCOUNTER — HOSPITAL ENCOUNTER (EMERGENCY)
Age: 45
Discharge: HOME | End: 2020-12-13
Attending: EMERGENCY MEDICINE
Payer: MEDICAID

## 2020-12-13 VITALS
TEMPERATURE: 98.6 F | HEART RATE: 105 BPM | RESPIRATION RATE: 17 BRPM | SYSTOLIC BLOOD PRESSURE: 100 MMHG | DIASTOLIC BLOOD PRESSURE: 64 MMHG | OXYGEN SATURATION: 97 %

## 2020-12-13 DIAGNOSIS — R51.9 NONINTRACTABLE HEADACHE, UNSPECIFIED CHRONICITY PATTERN, UNSPECIFIED HEADACHE TYPE: Primary | ICD-10-CM

## 2020-12-13 DIAGNOSIS — Z98.2 S/P VP SHUNT: ICD-10-CM

## 2020-12-13 LAB
ALBUMIN SERPL-MCNC: 4.4 G/DL (ref 3.5–5)
ALBUMIN/GLOB SERPL: 1.1 {RATIO} (ref 1.1–2.2)
ALP SERPL-CCNC: 90 U/L (ref 45–117)
ALT SERPL-CCNC: 37 U/L (ref 12–78)
ANION GAP SERPL CALC-SCNC: 6 MMOL/L (ref 5–15)
AST SERPL-CCNC: 15 U/L (ref 15–37)
BASOPHILS # BLD: 0.1 K/UL (ref 0–0.1)
BASOPHILS NFR BLD: 1 % (ref 0–1)
BILIRUB SERPL-MCNC: 0.3 MG/DL (ref 0.2–1)
BUN SERPL-MCNC: 16 MG/DL (ref 6–20)
BUN/CREAT SERPL: 21 (ref 12–20)
CALCIUM SERPL-MCNC: 10.4 MG/DL (ref 8.5–10.1)
CHLORIDE SERPL-SCNC: 102 MMOL/L (ref 97–108)
CO2 SERPL-SCNC: 28 MMOL/L (ref 21–32)
COMMENT, HOLDF: NORMAL
CREAT SERPL-MCNC: 0.78 MG/DL (ref 0.55–1.02)
DIFFERENTIAL METHOD BLD: NORMAL
EOSINOPHIL # BLD: 0.4 K/UL (ref 0–0.4)
EOSINOPHIL NFR BLD: 5 % (ref 0–7)
ERYTHROCYTE [DISTWIDTH] IN BLOOD BY AUTOMATED COUNT: 12 % (ref 11.5–14.5)
GLOBULIN SER CALC-MCNC: 4.1 G/DL (ref 2–4)
GLUCOSE SERPL-MCNC: 100 MG/DL (ref 65–100)
HCT VFR BLD AUTO: 42.7 % (ref 35–47)
HGB BLD-MCNC: 14.3 G/DL (ref 11.5–16)
IMM GRANULOCYTES # BLD AUTO: 0 K/UL (ref 0–0.04)
IMM GRANULOCYTES NFR BLD AUTO: 0 % (ref 0–0.5)
INR PPP: 0.9 (ref 0.9–1.1)
LYMPHOCYTES # BLD: 3.3 K/UL (ref 0.8–3.5)
LYMPHOCYTES NFR BLD: 41 % (ref 12–49)
MCH RBC QN AUTO: 29.9 PG (ref 26–34)
MCHC RBC AUTO-ENTMCNC: 33.5 G/DL (ref 30–36.5)
MCV RBC AUTO: 89.1 FL (ref 80–99)
MONOCYTES # BLD: 0.6 K/UL (ref 0–1)
MONOCYTES NFR BLD: 8 % (ref 5–13)
NEUTS SEG # BLD: 3.8 K/UL (ref 1.8–8)
NEUTS SEG NFR BLD: 45 % (ref 32–75)
NRBC # BLD: 0 K/UL (ref 0–0.01)
NRBC BLD-RTO: 0 PER 100 WBC
PLATELET # BLD AUTO: 372 K/UL (ref 150–400)
PMV BLD AUTO: 9.7 FL (ref 8.9–12.9)
POTASSIUM SERPL-SCNC: 4.4 MMOL/L (ref 3.5–5.1)
PROT SERPL-MCNC: 8.5 G/DL (ref 6.4–8.2)
PROTHROMBIN TIME: 9.9 SEC (ref 9–11.1)
RBC # BLD AUTO: 4.79 M/UL (ref 3.8–5.2)
SAMPLES BEING HELD,HOLD: NORMAL
SODIUM SERPL-SCNC: 136 MMOL/L (ref 136–145)
WBC # BLD AUTO: 8.2 K/UL (ref 3.6–11)

## 2020-12-13 PROCEDURE — 99285 EMERGENCY DEPT VISIT HI MDM: CPT

## 2020-12-13 PROCEDURE — 70250 X-RAY EXAM OF SKULL: CPT

## 2020-12-13 PROCEDURE — 74011250636 HC RX REV CODE- 250/636: Performed by: EMERGENCY MEDICINE

## 2020-12-13 PROCEDURE — 85025 COMPLETE CBC W/AUTO DIFF WBC: CPT

## 2020-12-13 PROCEDURE — 74011250637 HC RX REV CODE- 250/637: Performed by: EMERGENCY MEDICINE

## 2020-12-13 PROCEDURE — 70450 CT HEAD/BRAIN W/O DYE: CPT

## 2020-12-13 PROCEDURE — 93005 ELECTROCARDIOGRAM TRACING: CPT

## 2020-12-13 PROCEDURE — 96374 THER/PROPH/DIAG INJ IV PUSH: CPT

## 2020-12-13 PROCEDURE — 85610 PROTHROMBIN TIME: CPT

## 2020-12-13 PROCEDURE — 36415 COLL VENOUS BLD VENIPUNCTURE: CPT

## 2020-12-13 PROCEDURE — 96375 TX/PRO/DX INJ NEW DRUG ADDON: CPT

## 2020-12-13 PROCEDURE — 74011000250 HC RX REV CODE- 250: Performed by: EMERGENCY MEDICINE

## 2020-12-13 PROCEDURE — 96376 TX/PRO/DX INJ SAME DRUG ADON: CPT

## 2020-12-13 PROCEDURE — 80053 COMPREHEN METABOLIC PANEL: CPT

## 2020-12-13 RX ORDER — ONDANSETRON 2 MG/ML
INJECTION INTRAMUSCULAR; INTRAVENOUS
Status: DISCONTINUED
Start: 2020-12-13 | End: 2020-12-13 | Stop reason: HOSPADM

## 2020-12-13 RX ORDER — ONDANSETRON 2 MG/ML
4 INJECTION INTRAMUSCULAR; INTRAVENOUS
Status: COMPLETED | OUTPATIENT
Start: 2020-12-13 | End: 2020-12-13

## 2020-12-13 RX ORDER — HYDROMORPHONE HYDROCHLORIDE 1 MG/ML
1 INJECTION, SOLUTION INTRAMUSCULAR; INTRAVENOUS; SUBCUTANEOUS
Status: COMPLETED | OUTPATIENT
Start: 2020-12-13 | End: 2020-12-13

## 2020-12-13 RX ADMIN — ONDANSETRON 4 MG: 2 INJECTION INTRAMUSCULAR; INTRAVENOUS at 14:27

## 2020-12-13 RX ADMIN — LIDOCAINE HYDROCHLORIDE 40 ML: 20 SOLUTION ORAL; TOPICAL at 14:43

## 2020-12-13 RX ADMIN — HYDROMORPHONE HYDROCHLORIDE 1 MG: 1 INJECTION, SOLUTION INTRAMUSCULAR; INTRAVENOUS; SUBCUTANEOUS at 14:27

## 2020-12-13 RX ADMIN — HYDROMORPHONE HYDROCHLORIDE 1 MG: 1 INJECTION, SOLUTION INTRAMUSCULAR; INTRAVENOUS; SUBCUTANEOUS at 16:09

## 2020-12-13 RX ADMIN — SODIUM CHLORIDE 500 ML: 900 INJECTION, SOLUTION INTRAVENOUS at 15:52

## 2020-12-13 RX ADMIN — HYDROMORPHONE HYDROCHLORIDE 1 MG: 1 INJECTION, SOLUTION INTRAMUSCULAR; INTRAVENOUS; SUBCUTANEOUS at 13:22

## 2020-12-13 RX ADMIN — ONDANSETRON 4 MG: 2 INJECTION INTRAMUSCULAR; INTRAVENOUS at 13:22

## 2020-12-13 NOTE — DISCHARGE INSTRUCTIONS
Please call Dr. Dash Moffett office first thing in the morning. Per the on call neurologist Dr. Anyi Donald, they can set you up with an LP by IR sometime in the next few days if needed.   Return to the ED for any new or worsening symptoms

## 2020-12-13 NOTE — ED PROVIDER NOTES
Patient is a 82-year-old female with past medical history significant for diabetes, GERD, intercranial hypertension status post  shunt in 2010, kidney stones and intermittent asthma who presents emergency department with 2 to 3 months of increasing headaches and blurred vision. She was recently seen by her ophthalmologist and neuro-ophthalmologist on 8 December Dr. Angela Stephens. She apparently cannot take acetazolamide due to allergy or Topamax. She states she was placed on Lasix again by her neurologist Dr. Niraj Garibay and has had 2 days of dosing of this. On exam apparently there was no identified disc edema or papilledema however she had significant visual field deficits on automated perimetry. She was recommended to come to the emergency department for evaluation of possible increased ICP. Dr. Niraj Garibay contacted the patient and told her that she needed to come into the emergency department for evaluation of her shunt. Patient denies any fevers, chills, known sick contacts, rash or focal deficits. Past Medical History:   Diagnosis Date    Adrenal adenoma, left 07/17/2020    Per [facility] urology records 4 cm left adrenal adenoma.      Anxiety     Callus of foot     Cervical cancer (HCC)     surgery    Chronic pain     fibromyalgia/cervical and thoracic and lower lumbar DDD    Diabetes ([address]     GERD (gastroesophageal reflux disease)     Headache     Hepatic steatosis     Hypertension     intercranial HTN    shunt    Insomnia     Mild intermittent asthma 5/20/2020    Diagnosed in her teens and again in her 29's, in adulthood flares about once a year/bronchitis    Recurrent kidney stones     Vertigo        Past Surgical History:   Procedure Laterality Date    COLONOSCOPY N/A 7/28/2020    COLONOSCOPY     :- [facility] ENDOSCOPY    HX CHOLECYSTECTOMY      HX CSF SHUNT      HX HERNIA REPAIR      HX HYSTERECTOMY  2000    HX LITHOTRIPSY      [address] Family History:   Problem Relation Age of Onset    Lung Cancer Mother     No Known Problems Father     Kidney Disease Maternal Grandmother     No Known Problems Child     No Known Problems Child     Other Paternal Grandmother         polio    Heart Disease Brother         \"loop in heart\"       Social History     Socioeconomic History    Marital status:      Spouse name: Not on file    Number of children: Not on file    Years of education: Not on file    Highest education level: Not on file   Occupational History    Not on file   Social Needs    Financial resource strain: Not on file    Food insecurity     Worry: Not on file     Inability: Not on file    Transportation needs     Medical: Not on file     Non-medical: Not on file   Tobacco Use    Smoking status: Former Smoker     Packs/day: 1.00     Years: 20.00     Pack years: 20.00     Types: Cigarettes     Last attempt to quit: 2018     Years since quittin.9    Smokeless tobacco: Never Used   Substance and Sexual Activity    Alcohol use: Not Currently    Drug use: Never    Sexual activity: Yes     Partners: Male     Birth control/protection: Surgical     Comment: Hysterectomy   Lifestyle    Physical activity     Days per week: Not on file     Minutes per session: Not on file    Stress: Not on file   Relationships    Social connections     Talks on phone: Not on file     Gets together: Not on file     Attends Alevism service: Not on file     Active member of club or organization: Not on file     Attends meetings of clubs or organizations: Not on file     Relationship status: Not on file    Intimate partner violence     Fear of current or ex partner: Not on file     Emotionally abused: Not on file     Physically abused: Not on file     Forced sexual activity: Not on file   Other Topics Concern    Not on file   Social History Narrative    Moved from [place], for job opportunity for .      Lives at home with  and 2 children. Filed for disability. ALLERGIES: Diatrizoate sodium; Iodinated contrast media; Iodine and iodide containing products; Morphine; Naprosyn [naproxen]; Nsaids (non-steroidal anti-inflammatory drug); and Other plant, animal, environmental    Review of Systems   Constitutional: Negative for fever. HENT: Negative for congestion, drooling and nosebleeds. Eyes: Positive for visual disturbance. Respiratory: Negative for shortness of breath. Cardiovascular: Negative for chest pain. Gastrointestinal: Positive for nausea. Negative for abdominal pain. Genitourinary: Negative for difficulty urinating. Musculoskeletal: Positive for neck pain. Negative for back pain. Skin: Negative for rash. Neurological: Positive for tremors (Patient reports history of intermittent benign essential tremor) and headaches. Negative for seizures, speech difficulty, weakness and numbness. Hematological: Does not bruise/bleed easily. Psychiatric/Behavioral: Negative. Vitals:    12/13/20 1053   BP: 110/74   Pulse: (!) 102   Resp: 18   Temp: 98.5 °F (36.9 °C)   SpO2: 98%            Physical Exam  Vitals signs and nursing note reviewed. Constitutional:       Appearance: Normal appearance. She is not ill-appearing, toxic-appearing or diaphoretic. HENT:      Head: Normocephalic and atraumatic. Right Ear: External ear normal.      Left Ear: External ear normal.      Nose:      Comments: Mask in place  Eyes:      General: No scleral icterus. Neck:      Musculoskeletal: Neck supple. Cardiovascular:      Rate and Rhythm: Normal rate. Pulses: Normal pulses. Pulmonary:      Effort: Pulmonary effort is normal.      Breath sounds: Normal breath sounds. Skin:     General: Skin is warm and dry. Findings: No rash. Neurological:      General: No focal deficit present. Mental Status: She is alert and oriented to person, place, and time.    Psychiatric:         Mood and Affect: Mood normal.         Behavior: Behavior normal.         Thought Content: Thought content normal.         Judgment: Judgment normal.          MDM  Number of Diagnoses or Management Options  Nonintractable headache, unspecified chronicity pattern, unspecified headache type:   S/P  shunt:   Diagnosis management comments: Appreciate conversation with Dr. Trini Oswald, on call for Dr. Alda Chicas. Due to chronicity of symptoms and after her review of notes and imaging today, she did not feel that pt needed an LP in the ED and felt that this could be set up as an outpt if needed. She states she will send a note to Dr. Alda Chicas and speak with him as well. Pt notes pain has resolved after meds and feels comfortable following up as an outpt. Strict return precautions given. Pt reports to the nurse that she has a h/o resting tachycardia and that her normal resting HR is around the low 100s. Amount and/or Complexity of Data Reviewed  Clinical lab tests: reviewed  Tests in the radiology section of CPT®: reviewed  Tests in the medicine section of CPT®: reviewed  Discuss the patient with other providers: yes           Procedures        EKG obtained at 1438 showing sinus tachycardia, rate 107, ovals, inferior Q waves are mild, age undetermined, no prior EKG available for comparison.

## 2020-12-13 NOTE — ED TRIAGE NOTES
Patient comes to the ER sent by neurologist for blurry vision to both eyes and headache.    + shunt   +ICHypertension

## 2020-12-14 DIAGNOSIS — G93.2 PSEUDOTUMOR CEREBRI: Primary | ICD-10-CM

## 2020-12-14 LAB
ATRIAL RATE: 107 BPM
CALCULATED P AXIS, ECG09: 51 DEGREES
CALCULATED R AXIS, ECG10: 21 DEGREES
CALCULATED T AXIS, ECG11: 24 DEGREES
DIAGNOSIS, 93000: NORMAL
P-R INTERVAL, ECG05: 164 MS
Q-T INTERVAL, ECG07: 340 MS
QRS DURATION, ECG06: 84 MS
QTC CALCULATION (BEZET), ECG08: 453 MS
VENTRICULAR RATE, ECG03: 107 BPM

## 2020-12-16 DIAGNOSIS — Z98.2 S/P VP SHUNT: ICD-10-CM

## 2020-12-16 DIAGNOSIS — G93.2 IDIOPATHIC INTRACRANIAL HYPERTENSION: Primary | ICD-10-CM

## 2020-12-17 ENCOUNTER — OFFICE VISIT (OUTPATIENT)
Dept: FAMILY MEDICINE CLINIC | Age: 45
End: 2020-12-17
Payer: MEDICAID

## 2020-12-17 VITALS
HEART RATE: 110 BPM | RESPIRATION RATE: 16 BRPM | SYSTOLIC BLOOD PRESSURE: 96 MMHG | TEMPERATURE: 98.1 F | WEIGHT: 226 LBS | BODY MASS INDEX: 37.65 KG/M2 | HEIGHT: 65 IN | OXYGEN SATURATION: 96 % | DIASTOLIC BLOOD PRESSURE: 68 MMHG

## 2020-12-17 DIAGNOSIS — G93.2 IDIOPATHIC INTRACRANIAL HYPERTENSION: ICD-10-CM

## 2020-12-17 DIAGNOSIS — E66.01 SEVERE OBESITY (HCC): ICD-10-CM

## 2020-12-17 DIAGNOSIS — E11.9 DIABETES MELLITUS TYPE 2, NONINSULIN DEPENDENT (HCC): ICD-10-CM

## 2020-12-17 DIAGNOSIS — D35.02 ADRENAL ADENOMA, LEFT: ICD-10-CM

## 2020-12-17 DIAGNOSIS — Z98.2 S/P VP SHUNT: ICD-10-CM

## 2020-12-17 DIAGNOSIS — I95.1 ORTHOSTATIC HYPOTENSION: Primary | ICD-10-CM

## 2020-12-17 DIAGNOSIS — G90.A POSTURAL ORTHOSTATIC TACHYCARDIA SYNDROME: ICD-10-CM

## 2020-12-17 PROCEDURE — 99214 OFFICE O/P EST MOD 30 MIN: CPT | Performed by: FAMILY MEDICINE

## 2020-12-17 NOTE — PATIENT INSTRUCTIONS
Dr. Lady Gregory Specializes in autonomic dysfunction [facility] Neurology [facility] [address] 5th floor [address] [facility] Phone: [phone] Fax: [phone]

## 2020-12-17 NOTE — PROGRESS NOTES
Cathy Sutton is a 39 y.o. female  Chief Complaint   Patient presents with    Irregular Heart Beat    Dizziness    Nausea     Health Maintenance Due   Topic Date Due    Pneumococcal 0-64 years (1 of 1 - PPSV23) 01/14/1983    Foot Exam Q1  01/14/1987    Eye Exam Retinal or Dilated  01/14/1987    DTaP/Tdap/Td series (1 - Tdap) 01/14/1998    PAP AKA CERVICAL CYTOLOGY  01/14/1998     Visit Vitals  BP 96/68 (BP 1 Location: Left arm, BP Patient Position: Sitting)   Pulse (!) 110   Temp 98.1 °F (36.7 °C) (Temporal)   Resp 16   Ht 5' 8\" (1.727 m)   Wt 226 lb (102.5 kg)   SpO2 96%   BMI 34.36 kg/m²     1. Have you been to the ER, urgent care clinic since your last visit? Hospitalized since your last visit? Yes, Monday ,  shunt in right side of brain and has a lumbar  scheduled tomorrow. 2. Have you seen or consulted any other health care providers outside of the [address] [place] [address] since your last visit? Include any pap smears or colon screening.  No

## 2020-12-17 NOTE — PROGRESS NOTES
Jimenez Stahl [facility]  Jm Montero. [facility], [address]  [phone]             Date of visit: 12/17/20   Subjective:      History obtained from:  the patient. Oj Ho is a 39 y.o. female who presents today for   Chief Complaint   Patient presents with    Irregular Heart Beat    Dizziness    Nausea     Headaches, dizzy, nauseated  For a long time but worse in recent months. Dx pseudotumor cerebri in 2009  Got  shunt in 2010  HA much worse in past 2 years    [facility] as they were worried her intracranial pressure was high    On hctz and lisinopril for HTN    Thinks she has POTS  Showed me pictures of her vitals she took at home last night:  bp laying down 128/78,   Standing 96/51,    At 83/71  after 5 minutes standing up      This past year has been bad with her health  More forgetful  Can't work because if she stands for long she hurts in right hip, both legs    DM well controlled as of 3 mo ago.     Wants me to sign form in support of weight loss surgery  Plans to get in a few months  Has tried numerous diet, exercise plans but not able to lose  Would help her intracranial pressure  However, surgery complicated somewhat by her having a  shunt    Patient Active Problem List    Diagnosis Date Noted    Idiopathic intracranial hypertension 12/18/2020    S/P  shunt 12/18/2020    Chronic migraine 12/18/2020    Type 2 diabetes mellitus with diabetic neuropathy ([address] 11/11/2020    Severe obesity ([address] 10/13/2020    Adrenal adenoma, left 07/17/2020    Mild intermittent asthma 05/20/2020    Vertigo     Recurrent kidney stones     Insomnia     Hypertension     Headache     GERD (gastroesophageal reflux disease)     Diabetes mellitus type 2, noninsulin dependent (HCC)     Cervical cancer ([address]     Callus of foot     Anxiety      Current Outpatient Medications   Medication Sig Dispense Refill    furosemide (LASIX) 20 mg tablet Take 1 Tab by mouth daily. 30 Tab 1    ondansetron (ZOFRAN ODT) 4 mg disintegrating tablet Take 1 Tab by mouth every eight (8) hours as needed for Nausea or Vomiting. 30 Tab 0    busPIRone (BUSPAR) 7.5 mg tablet Take 1 Tab by mouth three (3) times daily. 90 Tab 2    butalbital-acetaminophen-caffeine (FIORICET, ESGIC) -40 mg per tablet TAKE ONE TABLET BY MOUTH EVERY 12 HOURS AS NEEDED FOR HEADACHE 30 Tab 0    cyclobenzaprine (FLEXERIL) 5 mg tablet TAKE ONE TABLET BY MOUTH EVERY MORNING AND TAKE TWO TABLETS BY MOUTH EVERY NIGHT AT BEDTIME 90 Tab 0    metFORMIN (GLUCOPHAGE) 500 mg tablet 2 tablets PO in AM and 1 tablet in  Tab 0    liraglutide (VICTOZA) 0.6 mg/0.1 mL (18 mg/3 mL) pnij 1.8 mg subcutaneous once daily for 2 weeks, then increase to 3 mg once daily 4 Each 2    gabapentin (NEURONTIN) 600 mg tablet Take 2 Tabs by mouth three (3) times daily. Max Daily Amount: 3,600 mg. 180 Tab 1    albuterol (PROVENTIL HFA, VENTOLIN HFA, PROAIR HFA) 90 mcg/actuation inhaler Take 2 Puffs by inhalation every six (6) hours as needed for Wheezing or Cough. 1 Inhaler 0    hydroCHLOROthiazide (HYDRODIURIL) 25 mg tablet Take 1 Tab by mouth every evening. 90 Tab 1    Insulin Needles, Disposable, 31 gauge x 5/16\" ndle Use one daily. 1 Package 11    cholecalciferol (VITAMIN D3) (400 Units /10 mcg) tab tablet Take 2 Tabs by mouth daily. Indications: prevention of vitamin D deficiency 180 Tab 2    Creon 36,000-114,000- 180,000 unit cpDR capsule       Myrbetriq 25 mg ER tablet       DULoxetine (Cymbalta) 60 mg capsule Take 1 Cap by mouth daily. Total daily dose 90 mg 90 Cap 1    traZODone (DESYREL) 50 mg tablet Take 3 Tabs by mouth nightly. Indications: insomnia associated with depression 270 Tab 1    DULoxetine (CYMBALTA) 30 mg capsule Take 1 Cap by mouth daily. Total daily dose 90 mg.   Indications: disorder characterized by stiff, tender & painful muscles 90 Cap 1    omeprazole (PRILOSEC) 40 mg capsule Take 1 Cap by mouth daily. 90 Cap 0    ondansetron (ZOFRAN ODT) 4 mg disintegrating tablet Take 1 Tab by mouth every eight (8) hours as needed for Nausea. 12 Tab 0    diphenhydrAMINE (Benadryl Allergy) 25 mg tablet Take 75 mg by mouth nightly.  Blood-Glucose Meter (OneTouch Verio Meter) misc CHECK BLOOD GLUCOSE TWICE A DAY 1 Each 0    glucose blood VI test strips (OneTouch Verio test strips) strip CHECK BLOOD GLUCOSE TWICE A  Strip 1     Allergies   Allergen Reactions    Diatrizoate Sodium Hives    Iodinated Contrast Media Hives    Iodine And Iodide Containing Products Hives    Morphine Hives    Naprosyn [Naproxen] Hives    Nsaids (Non-Steroidal Anti-Inflammatory Drug) Swelling     Swelling all over.  Other Plant, Animal, Environmental Other (comments)     A medication given for vertigo caused headaches. Unsure of name. Past Medical History:   Diagnosis Date    Adrenal adenoma, left 07/17/2020    Per [facility] urology records 4 cm left adrenal adenoma.      Anxiety     Callus of foot     Cervical cancer (HCC)     surgery    Chronic pain     fibromyalgia/cervical and thoracic and lower lumbar DDD    Diabetes ([facility] [address]     GERD (gastroesophageal reflux disease)     Headache     Hepatic steatosis     Hypertension     intercranial HTN    shunt    Insomnia     Mild intermittent asthma 5/20/2020    Diagnosed in her teens and again in her 29's, in adulthood flares about once a year/bronchitis    Recurrent kidney stones     Vertigo      Past Surgical History:   Procedure Laterality Date    COLONOSCOPY N/A 7/28/2020    COLONOSCOPY     :- performed by Chepe Kunz MD at [facility] ENDOSCOPY    HX CHOLECYSTECTOMY      HX CSF SHUNT      HX HERNIA REPAIR      HX HYSTERECTOMY  2000    HX LITHOTRIPSY      HX OOPHORECTOMY  1999     Family History   Problem Relation Age of Onset    Lung Cancer Mother     No Known Problems Father     Kidney Disease Maternal Grandmother     No Known Problems Child     No Known Problems Child     Other Paternal Grandmother         polio    Heart Disease Brother         \"loop in heart\"     Social History     Tobacco Use    Smoking status: Former Smoker     Packs/day: 1.00     Years: 20.00     Pack years: 20.00     Types: Cigarettes     Quit date:      Years since quittin.9    Smokeless tobacco: Never Used   Substance Use Topics    Alcohol use: Not Currently      Social History     Social History Narrative    Moved from [place], for job opportunity for . Lives at home with  and 2 children. Filed for disability. Review of Systems  Gen: denies fever  pulm denies cough         Objective:     Vitals:    20 1328   BP: 96/68   Pulse: (!) 110   Resp: 16   Temp: 98.1 °F (36.7 °C)   TempSrc: Temporal   SpO2: 96%   Weight: 226 lb (102.5 kg)   Height: 5' 5\" (1.651 m)     Body mass index is 37.61 kg/m². General: stated age, obese and in NAD  Neck: supple, symmetrical, trachea midline, no adenopathy and thyroid: not enlarged, symmetric, no tenderness/mass/nodules  Lungs:  clear to auscultation w/o rales, rhonchi, wheezes w/normal effort and no use of accessory muscles of respiration   Heart: regular rate and rhythm, S1, S2 normal, no murmur, click, rub or gallop  Abdomen: soft, nontender  Neuro: CN 2-12 intact and gait normal  Ext:  No edema noted. Lymph: no cervical adenopathy appreciated  Skin:  Normal. and no rash or abnormalities   Psych: alert and oriented to person, place, time and situation and Speech: appropriate quality, quantity and organization of sentences     Assessment/Plan:       ICD-10-CM ICD-9-CM    1. Orthostatic hypotension  I95.1 458.0    2. Postural orthostatic tachycardia syndrome  I49.8 427.89    3. Diabetes mellitus type 2, noninsulin dependent (HCC)  E11.9 250.00    4. Severe obesity ([facility] [address]  E66.01 278.01    5.  Idiopathic intracranial hypertension  G93.2 348.2    6. S/P  shunt  Z98.2 V45.2    7. Chronic migraine  G43.709 346.70    8. Adrenal adenoma, left  D35.02 227.0         No orders of the defined types were placed in this encounter. Definitely is orthostatic and that is giving her many symptoms  Does also have chronic migraine  To get an LP tomorrow in case of increased intracranial pressure again or  shunt not working well  I want to find out more about the adrenal adenoma noted in her problem list. Can't find any imaging studies. Will reach out to her neurologist and get his suggestions  Stop lisinopril for now; BPs not too high. Needs the hctz/lasix for her intracranial pressures; wonder if she could do ok off the lasix and if that would help her orthostasis  Encouraged her to stay hydrated  Will continue to follow up on portal as her situation is complex and may need to involve several specialists    Discussed the diagnosis and plan and she expressed understanding. Follow-up and Dispositions    · Return in about 3 months (around 3/17/2021).          Antonio Doty MD

## 2020-12-18 ENCOUNTER — HOSPITAL ENCOUNTER (OUTPATIENT)
Dept: GENERAL RADIOLOGY | Age: 45
Discharge: HOME | End: 2020-12-18
Attending: PSYCHIATRY & NEUROLOGY
Payer: MEDICAID

## 2020-12-18 VITALS
HEART RATE: 93 BPM | SYSTOLIC BLOOD PRESSURE: 115 MMHG | DIASTOLIC BLOOD PRESSURE: 73 MMHG | BODY MASS INDEX: 36.99 KG/M2 | TEMPERATURE: 99 F | OXYGEN SATURATION: 96 % | HEIGHT: 65 IN | WEIGHT: 222 LBS

## 2020-12-18 DIAGNOSIS — G93.2 PSEUDOTUMOR CEREBRI: ICD-10-CM

## 2020-12-18 PROBLEM — Z98.2 S/P VP SHUNT: Status: ACTIVE | Noted: 2020-12-18

## 2020-12-18 LAB
APPEARANCE CSF: CLEAR
COLOR CSF: COLORLESS
COMMENT, HOLDF: NORMAL
GLUCOSE CSF-MCNC: 58 MG/DL (ref 40–70)
PROT CSF-MCNC: 70 MG/DL (ref 15–45)
RBC # CSF: 0 /CU MM
SAMPLES BEING HELD,HOLD: NORMAL
TUBE # CSF: 1
TUBE # CSF: 1
TUBE # CSF: 3
WBC # CSF: 3 /CU MM (ref 0–5)

## 2020-12-18 PROCEDURE — 62270 DX LMBR SPI PNXR: CPT

## 2020-12-18 PROCEDURE — 83916 OLIGOCLONAL BANDS: CPT

## 2020-12-18 PROCEDURE — 89050 BODY FLUID CELL COUNT: CPT

## 2020-12-18 PROCEDURE — 83873 ASSAY OF CSF PROTEIN: CPT

## 2020-12-18 PROCEDURE — 82945 GLUCOSE OTHER FLUID: CPT

## 2020-12-18 PROCEDURE — 84157 ASSAY OF PROTEIN OTHER: CPT

## 2020-12-18 RX ORDER — LIDOCAINE HYDROCHLORIDE 10 MG/ML
5 INJECTION, SOLUTION EPIDURAL; INFILTRATION; INTRACAUDAL; PERINEURAL
Status: DISPENSED | OUTPATIENT
Start: 2020-12-18 | End: 2020-12-18

## 2020-12-18 RX ORDER — SODIUM BICARBONATE 42 MG/ML
10 INJECTION, SOLUTION INTRAVENOUS
Status: DISPENSED | OUTPATIENT
Start: 2020-12-18 | End: 2020-12-18

## 2020-12-18 NOTE — ROUTINE PROCESS
Pt. Discharged to home and transported to d/c lot via w/c. Post LP instructions given and pt. verbalized understanding of instructions.

## 2020-12-18 NOTE — DISCHARGE INSTRUCTIONS
POST LUMBAR PUNCTURE DISCHARGE INSTRUCTIONS    General Information:    Lumbar Puncture: A LP is done to help diagnose several disorders, like pseudo tumor, migraines, meningitis, and multiple sclerosis. It involves a puncture (usually in the lower spine) into the sac that protects the spinal column. A sample of the fluid in that space is removed and tested in the lab. Call If:     You should call your Physician and/or the Radiology Nurse if you develop a headache that is not relieved by Tylenol, and worsens when you stand and eases when you lie down, you need to call. You may have developed what is referred to as a spinal headache. Our physician's will probably advise you to be on strict bed rest for 24 hours, to drink lots of fluids and caffeine. If this does not help the head pain, call again the next day. You should call if you have bleeding other than a small spot on your bandage. You should call if you have any numbness, tingling, weakness, fever, chills, urinary retention, severe itching, rash, welts, swelling, or confusion. Follow-Up Instructions: See the doctor who ordered your procedure as he/she has instructed. If you had a Lumbar Puncture, your results should be available to your ordering doctor in 3-5 business days. You can remove your dressing in 24 hours and shower regularly. Do not bathe or swim for 72 hours. To Reach Us:    Should you experience any significant changes, please call [phone] between the hours of 7:30 am and 10 pm or [phone] after hours.  After hours, ask the  to page the [address] [place] [address] Technologist, and describe the problem to the technologist.        Patient Signature:  Date: 12/18/2020  Discharging Nurse: Piedad Patel RN

## 2020-12-21 ENCOUNTER — TELEPHONE (OUTPATIENT)
Dept: NEUROLOGY | Age: 45
End: 2020-12-21

## 2020-12-21 LAB — MBP CSF-MCNC: 7.2 NG/ML (ref 0–3.7)

## 2020-12-21 NOTE — TELEPHONE ENCOUNTER
Lab is calling regarding the patients labwork. It is kind of important to speak with the nurse about it.

## 2020-12-21 NOTE — TELEPHONE ENCOUNTER
Elan Elkins, with lab, stated Oligloclonal Bands was done.  Patient did not have blood drawn.  (FYI)

## 2020-12-21 NOTE — PROGRESS NOTES
I gave patient this information. Patient asked Roma Hamilton about my blood work and the other tests looking for MS? \" Please advise.

## 2020-12-22 LAB — OLIGOCLONAL BANDS.IT SER+CSF QL: NORMAL

## 2020-12-23 ENCOUNTER — OFFICE VISIT (OUTPATIENT)
Dept: NEUROLOGY | Age: 45
End: 2020-12-23
Payer: MEDICAID

## 2020-12-23 VITALS
RESPIRATION RATE: 18 BRPM | SYSTOLIC BLOOD PRESSURE: 126 MMHG | OXYGEN SATURATION: 98 % | DIASTOLIC BLOOD PRESSURE: 84 MMHG | HEART RATE: 109 BPM

## 2020-12-23 DIAGNOSIS — M48.061 SPINAL STENOSIS OF LUMBAR REGION, UNSPECIFIED WHETHER NEUROGENIC CLAUDICATION PRESENT: ICD-10-CM

## 2020-12-23 DIAGNOSIS — G43.001 MIGRAINE WITHOUT AURA AND WITH STATUS MIGRAINOSUS, NOT INTRACTABLE: Primary | ICD-10-CM

## 2020-12-23 DIAGNOSIS — G90.A POTS (POSTURAL ORTHOSTATIC TACHYCARDIA SYNDROME): ICD-10-CM

## 2020-12-23 DIAGNOSIS — Z98.2 S/P VP SHUNT: ICD-10-CM

## 2020-12-23 DIAGNOSIS — M79.7 FIBROMYALGIA: ICD-10-CM

## 2020-12-23 DIAGNOSIS — G93.2 IDIOPATHIC INTRACRANIAL HYPERTENSION: ICD-10-CM

## 2020-12-23 PROCEDURE — 99215 OFFICE O/P EST HI 40 MIN: CPT | Performed by: PSYCHIATRY & NEUROLOGY

## 2020-12-23 NOTE — PROGRESS NOTES
No chief complaint on file. HISTORY OF PRESENT ILLNESS  Temi Ruiz is a 39 y.o. female came back for follow-up. Her recent ophthalmology evaluation raised the concern for occult disc edema. It was suspected that her chronic disc edema resulted in retinal neuroma of fiber layer loss, making the edema less obvious. Peripheral visual field deficits were noted on perimetry. She underwent lumbar puncture which revealed an opening pressure of 8.6 cm of water  She also underwent x-ray shunt series which revealed a patent shunt. Her symptoms overall are unchanged. She complains of aches and pains all over and is wondering if this could be related to fibromyalgia. She also has degenerative cervical and lumbar spine disease. She reports symptoms of neurogenic claudication. If she stands for more than 15 minutes or walks for too long her back and legs will start to hurt and she gets a tingling sensation in her legs. Her work-up and imaging was done in [place]  She also has radicular type of thoracic pain that starts at about T9-T10 levels and radiates to the sides. She has been complaining of intermittent dizziness, blurring of vision and orthostatic vitals at her PCP office revealed orthostatic hypotension with tachycardia. An underlying postural tachycardia syndrome was a concern. She denies any palpitations or syncopal events. She does have diabetes. Currently she is taking high-dose of gabapentin at 3600 mg/day along with Cymbalta 90 mg/day to help with neuropathic pain and fibromyalgia    She was exposed to COVID-19 infection back in May and recovered well. She also has tremors in her hands which are mainly related to activity and this has been present for many years    Has chronic migraines. Doing relatively well from a migraine standpoint.   Takes muscle relaxers/cyclobenzaprine as needed      RECAP  She has history of idiopathic intracranial hypertension and had a  shunt implantation in 2010 at [facility]. She was having a lot of headaches and shunt helped significantly. Also has a history of cluster headaches and migraines but those have not occurred in years. Has fibromyalgia for which she takes gabapentin and Cymbalta. Current Outpatient Medications   Medication Sig    furosemide (LASIX) 20 mg tablet Take 1 Tab by mouth daily.  ondansetron (ZOFRAN ODT) 4 mg disintegrating tablet Take 1 Tab by mouth every eight (8) hours as needed for Nausea or Vomiting.  busPIRone (BUSPAR) 7.5 mg tablet Take 1 Tab by mouth three (3) times daily.  butalbital-acetaminophen-caffeine (FIORICET, ESGIC) -40 mg per tablet TAKE ONE TABLET BY MOUTH EVERY 12 HOURS AS NEEDED FOR HEADACHE    cyclobenzaprine (FLEXERIL) 5 mg tablet TAKE ONE TABLET BY MOUTH EVERY MORNING AND TAKE TWO TABLETS BY MOUTH EVERY NIGHT AT BEDTIME    metFORMIN (GLUCOPHAGE) 500 mg tablet 2 tablets PO in AM and 1 tablet in PM    liraglutide (VICTOZA) 0.6 mg/0.1 mL (18 mg/3 mL) pnij 1.8 mg subcutaneous once daily for 2 weeks, then increase to 3 mg once daily    gabapentin (NEURONTIN) 600 mg tablet Take 2 Tabs by mouth three (3) times daily. Max Daily Amount: 3,600 mg.    albuterol (PROVENTIL HFA, VENTOLIN HFA, PROAIR HFA) 90 mcg/actuation inhaler Take 2 Puffs by inhalation every six (6) hours as needed for Wheezing or Cough.  hydroCHLOROthiazide (HYDRODIURIL) 25 mg tablet Take 1 Tab by mouth every evening.  Insulin Needles, Disposable, 31 gauge x 5/16\" ndle Use one daily.  cholecalciferol (VITAMIN D3) (400 Units /10 mcg) tab tablet Take 2 Tabs by mouth daily. Indications: prevention of vitamin D deficiency    Creon 36,000-114,000- 180,000 unit cpDR capsule     Myrbetriq 25 mg ER tablet     DULoxetine (Cymbalta) 60 mg capsule Take 1 Cap by mouth daily. Total daily dose 90 mg    traZODone (DESYREL) 50 mg tablet Take 3 Tabs by mouth nightly.  Indications: insomnia associated with depression    DULoxetine (CYMBALTA) 30 mg capsule Take 1 Cap by mouth daily. Total daily dose 90 mg. Indications: disorder characterized by stiff, tender & painful muscles    omeprazole (PRILOSEC) 40 mg capsule Take 1 Cap by mouth daily.  ondansetron (ZOFRAN ODT) 4 mg disintegrating tablet Take 1 Tab by mouth every eight (8) hours as needed for Nausea.  diphenhydrAMINE (Benadryl Allergy) 25 mg tablet Take 75 mg by mouth nightly.  Blood-Glucose Meter (OneTouch Verio Meter) misc CHECK BLOOD GLUCOSE TWICE A DAY    glucose blood VI test strips (OneTouch Verio test strips) strip CHECK BLOOD GLUCOSE TWICE A DAY     No current facility-administered medications for this visit. PHYSICAL EXAMINATION:    Visit Vitals  /84   Pulse (!) 109   Resp 18   SpO2 98%       Exam:  NEUROLOGICAL EXAM:  General: Awake, alert, oriented x 3  CN: EOMI, facial strength normal and symmetric, hearing is intact. Fundus exam reveals mildly obscured disc margins. Motor: AG x 4  Reflexes: deferred  Coordination: No ataxia.   Sensation: LT intact throughout  Gait: Steady    Orthostatic vitals lying down: 120/80, heart rate 82  Standing up: 100/78, heart rate 117    LABS:    CSF was unremarkable except for elevated total protein at 70 and elevated myelin basic protein, WBC 3, RBC 0, glucose 58, oligoclonal bands 0    Lab Results   Component Value Date/Time    WBC 8.2 12/13/2020 01:07 PM    HGB 14.3 12/13/2020 01:07 PM    HCT 42.7 12/13/2020 01:07 PM    PLATELET 784 75/75/9612 01:07 PM    MCV 89.1 12/13/2020 01:07 PM     Lab Results   Component Value Date/Time    Sodium 136 12/13/2020 01:07 PM    Potassium 4.4 12/13/2020 01:07 PM    Chloride 102 12/13/2020 01:07 PM    CO2 28 12/13/2020 01:07 PM    Anion gap 6 12/13/2020 01:07 PM    Glucose 100 12/13/2020 01:07 PM    BUN 16 12/13/2020 01:07 PM    Creatinine 0.78 12/13/2020 01:07 PM    BUN/Creatinine ratio 21 (H) 12/13/2020 01:07 PM    GFR est AA >60 12/13/2020 01:07 PM    GFR est non-AA >60 12/13/2020 01:07 PM    Calcium 10.4 (H) 12/13/2020 01:07 PM    Bilirubin, total 0.3 12/13/2020 01:07 PM    Alk. phosphatase 90 12/13/2020 01:07 PM    Protein, total 8.5 (H) 12/13/2020 01:07 PM    Albumin 4.4 12/13/2020 01:07 PM    Globulin 4.1 (H) 12/13/2020 01:07 PM    A-G Ratio 1.1 12/13/2020 01:07 PM    ALT (SGPT) 37 12/13/2020 01:07 PM    AST (SGOT) 15 12/13/2020 01:07 PM       ASSESSMENT    ICD-10-CM ICD-9-CM    1. Migraine without aura and with status migrainosus, not intractable  G43.001 346.12    2. Idiopathic intracranial hypertension  G93.2 348.2    3. S/P  shunt  Z98.2 V45.2    4. Fibromyalgia  M79.7 729.1    5. Spinal stenosis of lumbar region, unspecified whether neurogenic claudication present  M48.061 724.02 EMG NCV MOTOR WO F/WAVE PER NERVE      REFERRAL TO PHYSICAL THERAPY   6. POTS (postural orthostatic tachycardia syndrome)  I49.8 427.89 REFERRAL TO NEUROLOGY       DISCUSSION  Ms. Bisi Pabno has a remote history of idiopathic intracranial hypertension and is status post ventriculoperitoneal shunt. She was recently complaining of for decline in her vision and had eye exam raise the concern for occult disc edema and possible increased intracranial pressure.   However, her lumbar puncture revealed a low normal opening pressure which would be expected in a functioning shunt  CSF revealed mild elevation of total protein which is likely related to the history of procedure/shunt and by itself is of unclear clinical significance  She was advised to make a follow-up appointment with ophthalmology    Also been complaining of postural dizziness and blurring of vision and her orthostatic vitals revealed a 20 point drop in her systolic blood pressure old tachycardia at 117 bpm.  She may have POTS or an underlying autonomic insufficiency/small fiber neuropathy due to diabetes  I have recommended autonomic nervous system testing for further diagnostic clarification  We will also check EMG/NCV to evaluate for peripheral neuropathy  She was encouraged to stay well-hydrated, increase salt intake and do regular exercises  Deferring any pharmacologic therapy to help with hypertension for now, as she is not very symptomatic    She also has chronic lumbar spinal stenosis with neurogenic claudication and she was referred for a course of physical therapy  We may consider repeat imaging of the lumbar spine depending upon clinical course    Continue Cymbalta and gabapentin for fibromyalgia      Krishan Ferguson MD  Diplomate, American Board of Psychiatry & Neurology (Neurology)  Diplomate, American Board of Psychiatry & Neurology (Clinical Neurophysiology)  Diplomate, American Board of Electrodiagnostic Medicine

## 2020-12-23 NOTE — PATIENT INSTRUCTIONS
PRESCRIPTION REFILL POLICY [address] [place] [address] Statement to Patients April 1, 2014 In an effort to ensure the large volume of patient prescription refills is processed in the most efficient and expeditious manner, we are asking our patients to assist us by calling your Pharmacy for all prescription refills, this will include also your  Mail Order Pharmacy. The pharmacy will contact our office electronically to continue the refill process. Please do not wait until the last minute to call your pharmacy. We need at least 48 hours (2days) to fill prescriptions. We also encourage you to call your pharmacy before going to  your prescription to make sure it is ready. With regard to controlled substance prescription refill requests (narcotic refills) that need to be picked up at our office, we ask your cooperation by providing us with at least 72 hours (3days) notice that you will need a refill. We will not refill narcotic prescription refill requests after 4:00pm on any weekday, Monday through Thursday, or after 2:00pm on Fridays, or on the weekends. We encourage everyone to explore another way of getting your prescription refill request processed using [employer], our patient web portal through our electronic medical record system. [employer] is an efficient and effective way to communicate your medication request directly to the office and  downloadable as an puneet on your smart phone . [employer] also features a review functionality that allows you to view your medication list as well as leave messages for your physician. Are you ready to get connected? If so please review the attatched instructions or speak to any of our staff to get you set up right away! Thank you so much for your cooperation. Should you have any questions please contact our Practice Administrator. The Physicians and Staff,  [address] [place] [address]

## 2020-12-26 DIAGNOSIS — G43.001 MIGRAINE WITHOUT AURA AND WITH STATUS MIGRAINOSUS, NOT INTRACTABLE: ICD-10-CM

## 2020-12-26 DIAGNOSIS — G93.2 IDIOPATHIC INTRACRANIAL HYPERTENSION: ICD-10-CM

## 2020-12-29 DIAGNOSIS — G93.2 IDIOPATHIC INTRACRANIAL HYPERTENSION: ICD-10-CM

## 2020-12-29 RX ORDER — ONDANSETRON 4 MG/1
4 TABLET, ORALLY DISINTEGRATING ORAL
Qty: 60 TAB | Refills: 1 | Status: SHIPPED | OUTPATIENT
Start: 2020-12-29 | End: 2021-01-05 | Stop reason: SDUPTHER

## 2020-12-30 RX ORDER — BUTALBITAL, ACETAMINOPHEN AND CAFFEINE 50; 325; 40 MG/1; MG/1; MG/1
TABLET ORAL
Qty: 30 TAB | Refills: 0 | Status: SHIPPED | OUTPATIENT
Start: 2020-12-30 | End: 2021-01-20

## 2020-12-30 RX ORDER — ONDANSETRON 4 MG/1
TABLET, ORALLY DISINTEGRATING ORAL
Qty: 30 TAB | Refills: 0 | Status: SHIPPED | OUTPATIENT
Start: 2020-12-30 | End: 2021-02-11 | Stop reason: SDUPTHER

## 2021-01-04 PROBLEM — M48.062 SPINAL STENOSIS OF LUMBAR REGION WITH NEUROGENIC CLAUDICATION: Status: ACTIVE | Noted: 2021-01-04

## 2021-01-04 NOTE — PROGRESS NOTES
[facility]  [place], [address]  [phone]             Date of visit: 1/5/21      Subjective:      History obtained from:  the patient. David Estrella is a 39 y.o. female who presents today for   Chief Complaint   Patient presents with    Pain (Chronic)     When I saw her a couple weeks ago she was having headaches, dizziness, and nausea  She was found to be very orthostatic and her lisinopril was stopped  She also stopped her hctz (which was for intracranial hypertension)  Her dizziness is better  Blood pressures ok when checked  Not nearly as dizzy when she stands up    Also on lasix, mainly for intracranial hypertension as well as regular hypertension   Has stopped her hctz  Also has  shunt. Recently had LP and opening pressure was normal, indicating shunt functioning well. CT head was ok last month. (Her eye doctor had been concerned that she could have increased pressure, due to findings on retina and decreased peripheral vision)    Having daily headaches, having to take 2 fioricet most days. If not taking that taking tylenol. Taking 4 tylenol at a time (2000mg) and understands that is not good for her. Sometimes chocolate/caffeine help; used to be on LOTS of Diet Mt Dew and not wanting to get back into soda habit  HA dx as migraine     Scheduled for EMG/NCV to eval for small fiber/autonomic neuropathy, given her POTS symptoms. On 1/18    She also had a bit of protein in her spinal fluid, thought likely secondary to the shunt  Myelin was slightly high; she had no oligoclonal bands to suggest MS    Very intolerant to heat. Keeps thermostat at 61 or 65 or is in pool of sweat and can't breathe  Went through menopause with bilateral oopherectomy in [address] off HRT because it made her gain weight    Also has history of adrenal incidentaloma, maybe [facility]?  Trying to get records  Last CT in August with [facility], had 4cm left aderenal mass   Was referred by PCP at that time to endorine at [facility]  She did seen endo, (Pelon Willson at [facility]) , they ordered labs and gave her a med to take before the lab, but she doesn't know where to go for the labs   We figured that out during her appointment when she checked her portal.  Not sure if they ordered thyroid tests. Has seen [facility] Jessica Thrasher about kidney stones, gross hematuria   Had lots of stones when she stopped soda and went to water  Doesn't think she has has had any stones pass recently, hasn't seen blood. Had a bladder biopsy done. On mybetriq as she had so much urinary frequency (going to bathroom 20-30x daily). It does help. All over pain of fibromyalgia hurts in muscles, hurts to the touch  Recently tends to hurt in neck, upper shoulders, low back  Burning pain in lower back, and radiates around to the front  Hurts most in her sacrum, midline, goes down legs, sometimes tingling. Numb in lower legs  Much worse with prolonged standing. Like today was standing at [place] for 15 minutes was near tears, hurt so bad in sacrum, low back, right lateral hip    Also has chronic lumbar spinal stenosis with neurogenic claudication; was referred by neuro Dr. Eder Elizondo for PT   Plans to get PT scheduled but hasn't yet. Has not found it helpful in the past but willing to try it again. I was unable to find her MRI in her medical records from [address] in [place]   Has been on high dose gabapentin 1200mg TID for a long time. Can tell it definitely helps some. If she misses it her whole body is nerves on fire  Doesn't make her groggy. Also on cymbalta 90mg daily for a long time. Has been on effexor in the past. Thinks cymbalta the best of ones she tried. Thinks she was on nortriptyline in the past, too but stopped taking it because she thought maybe it was causing anxiety. Doesn't think now that is what it was.   Needs neurosurgeon in the room if she does an MRI (brain MRI was ordered by neuro here)  An MRI could reset her programable shunt so not safe for her to do without neurosurgeon there who could reprogram it    Can't work now--too much pain and also just too many doctors appointments  Feels like the pain is what is really keeping her from working  Would like to get a part time job at least  Has applied for disability; did an appeal    DM well controlled with a1c 6.3 in Sept. On metformin, victoza; better than it has been for a long time  However victoza hasn't helped weight loss  Looking into weight loss surgery and really hopes to get that soon    History of cervical cancer with hysterectomy in 1999 (was very low stage and they got it all so didn't need to keep doing paps)  Last pap in 1999    Pneumovax (DM) declined  TDAP not covered by her insurance  Flu shot declined    Patient Active Problem List    Diagnosis Date Noted    Spinal stenosis of lumbar region with neurogenic claudication 01/04/2021    Idiopathic intracranial hypertension 12/18/2020    S/P  shunt 12/18/2020    Chronic migraine 12/18/2020    Type 2 diabetes mellitus with diabetic neuropathy ([facility] 11/11/2020    Severe obesity ([facility] 10/13/2020    Adrenal adenoma, left 07/17/2020    Mild intermittent asthma 05/20/2020    Vertigo     Recurrent kidney stones     Insomnia     Hypertension     Headache     GERD (gastroesophageal reflux disease)     Cervical cancer (HCC)     Callus of foot     Anxiety      Current Outpatient Medications   Medication Sig Dispense Refill    amitriptyline (ELAVIL) 50 mg tablet Take 1 Tab by mouth nightly. To prevent migraine 30 Tab 3    promethazine (PHENERGAN) 25 mg tablet Take 1 Tab by mouth every six (6) hours as needed (migraine).  120 Tab 0    cyclobenzaprine (FLEXERIL) 5 mg tablet TAKE ONE TABLET BY MOUTH EVERY MORNING AND TAKE TWO TABLETS BY MOUTH EVERY NIGHT AT BEDTIME 90 Tab 3    ondansetron (ZOFRAN ODT) 4 mg disintegrating tablet DISSOLVE ONE TABLET BY MOUTH EVERY 8 HOURS AS NEEDED FOR NAUSEA AND VOMITING 30 Tab 0    butalbital-acetaminophen-caffeine (FIORICET, ESGIC) -40 mg per tablet TAKE ONE TABLET BY MOUTH EVERY 12 HOURS AS NEEDED FOR HEADACHE 30 Tab 0    busPIRone (BUSPAR) 7.5 mg tablet Take 1 Tab by mouth three (3) times daily. 90 Tab 2    metFORMIN (GLUCOPHAGE) 500 mg tablet 2 tablets PO in AM and 1 tablet in PM (Patient taking differently: 1 tabs twice daily) 360 Tab 0    liraglutide (VICTOZA) 0.6 mg/0.1 mL (18 mg/3 mL) pnij 1.8 mg subcutaneous once daily for 2 weeks, then increase to 3 mg once daily 4 Each 2    gabapentin (NEURONTIN) 600 mg tablet Take 2 Tabs by mouth three (3) times daily. Max Daily Amount: 3,600 mg. 180 Tab 1    albuterol (PROVENTIL HFA, VENTOLIN HFA, PROAIR HFA) 90 mcg/actuation inhaler Take 2 Puffs by inhalation every six (6) hours as needed for Wheezing or Cough. 1 Inhaler 0    Insulin Needles, Disposable, 31 gauge x 5/16\" ndle Use one daily. 1 Package 11    cholecalciferol (VITAMIN D3) (400 Units /10 mcg) tab tablet Take 2 Tabs by mouth daily. Indications: prevention of vitamin D deficiency 180 Tab 2    Creon 36,000-114,000- 180,000 unit cpDR capsule       Myrbetriq 25 mg ER tablet       DULoxetine (Cymbalta) 60 mg capsule Take 1 Cap by mouth daily. Total daily dose 90 mg 90 Cap 1    DULoxetine (CYMBALTA) 30 mg capsule Take 1 Cap by mouth daily. Total daily dose 90 mg. Indications: disorder characterized by stiff, tender & painful muscles 90 Cap 1    diphenhydrAMINE (Benadryl Allergy) 25 mg tablet Take 75 mg by mouth nightly.  Blood-Glucose Meter (OneTouch Verio Meter) misc CHECK BLOOD GLUCOSE TWICE A DAY 1 Each 0    glucose blood VI test strips (OneTouch Verio test strips) strip CHECK BLOOD GLUCOSE TWICE A  Strip 1    traZODone (DESYREL) 50 mg tablet Take 3 Tabs by mouth nightly. Indications: insomnia associated with depression 270 Tab 1    omeprazole (PRILOSEC) 40 mg capsule Take 1 Cap by mouth daily. 90 Cap 0     Allergies   Allergen Reactions    Diatrizoate Sodium Hives    Iodinated Contrast Media Hives    Iodine And Iodide Containing Products Hives    Morphine Hives    Naprosyn [Naproxen] Hives    Nsaids (Non-Steroidal Anti-Inflammatory Drug) Swelling     Swelling all over.  Other Plant, Animal, Environmental Other (comments)     A medication given for vertigo caused headaches. Unsure of name. Past Medical History:   Diagnosis Date    Adrenal adenoma, left 07/17/2020    Per [place] [facility] records 4 cm left adrenal adenoma.      Anxiety     Callus of foot     Cervical cancer (HCC)     surgery    Chronic pain     fibromyalgia/cervical and thoracic and lower lumbar DDD    Diabetes ([address]     GERD (gastroesophageal reflux disease)     Headache     Hepatic steatosis     Hypertension     intercranial HTN    shunt    Insomnia     Mild intermittent asthma 5/20/2020    Diagnosed in her teens and again in her 29's, in adulthood flares about once a year/bronchitis    Recurrent kidney stones     Vertigo      Past Surgical History:   Procedure Laterality Date    COLONOSCOPY N/A 7/28/2020    COLONOSCOPY     :- [facility] END[facility]    HX [address] Left 08/02/2018    at [address] Right 09/13/2018    at [address] HX CSF SHUNT      HX HERNIA REPAIR      HX HYSTERECTOMY  2000    for early stage cervical cancer, was told paps not needed    HX LITHOTRIPSY      HX OOPHORECTOMY Bilateral 1999    HX ORTHOPAEDIC Right 09/13/2018    trigger finger release at [facility] in [facility]     Family History   Problem Relation Age of Onset    Lung Cancer Mother     No Known Problems Father     Kidney Disease Maternal Grandmother     No Known Problems Child     No Known Problems Child     Other Paternal Grandmother         polio    Heart Disease Brother         \"loop in heart\"     Social History     Tobacco Use    Smoking status: Former Smoker     Packs/day: 1.00     Years: 20.00     Pack years: 20.00     Types: Cigarettes     Quit date: 2018     Years since quitting: 3.0    Smokeless tobacco: Never Used   Substance Use Topics    Alcohol use: Not Currently      Social History     Social History Narrative    Moved from [place], for job opportunity for . Lives at home with  and 2 children. Filed for disability. Review of Systems  Gen: denies fever  pulm denies cough        Objective:     Vitals:    01/05/21 0921   BP: 130/78   Pulse: 87   Resp: 18   Temp: 97.4 °F (36.3 °C)   TempSrc: Temporal   SpO2: 99%   Weight: 234 lb 6.4 oz (106.3 kg)   Height: 5' 5\" (1.651 m)   sitting: /86   Laying down: /80 HR 80  Body mass index is 39.01 kg/m². General: stated age, obese and in NAD  Neck: supple, symmetrical, trachea midline, no adenopathy and thyroid: not enlarged, symmetric, no tenderness/mass/nodules  Lungs:  clear to auscultation w/o rales, rhonchi, wheezes w/normal effort and no use of accessory muscles of respiration   Heart: regular rate and rhythm, S1, S2 normal, no murmur, click, rub or gallop  Abdomen: soft, nontender, no masses  Ext:  No edema noted. Lymph: no cervical adenopathy appreciated  Skin:  Normal. and no rash or abnormalities   Psych: alert and oriented to person, place, time and situation and Speech: appropriate quality, quantity and organization of sentences  MSK: exquisitely tender to light touch of many muscles in back, upper extremities. Also tender spinous processes thoracic and lumbar and especially sacral.  Positive straight leg raise bilaterally. Neuro: CN 2-12 intact, strength 5/5, patellar reflexes 2+ bilaterally, sensation intact to light touch bilaterally, gait normal     Assessment/Plan:       ICD-10-CM ICD-9-CM    1. Orthostatic hypotension  I95.1 458.0    2. Chronic migraine  G43.709 346.70    3.  Fibromyalgia M79.7 729.1    4. Diabetes mellitus type 2, noninsulin dependent (HCC)  E11.9 250.00    5. Idiopathic intracranial hypertension  G93.2 348.2    6. Analgesic rebound headache  G44.40 339.3     T39.95XA E935.9    7. Chronic back pain, unspecified back location, unspecified back pain laterality  M54.9 724.5     G89.29 338.29    8. Insomnia due to other mental disorder  F51.05 300.9     F99 327.02    9. Essential hypertension  I10 401.9    10. Severe obesity (HCC)  E66.01 278.01    11. Spinal stenosis of lumbar region with neurogenic claudication  M48.062 724.03 REFERRAL TO NEUROSURGERY   12. S/P  shunt  Z98.2 V45.2 REFERRAL TO NEUROSURGERY   13. Hematuria, gross  R31.0 599.71 URINALYSIS W/ RFLX MICROSCOPIC      URINALYSIS W/ RFLX MICROSCOPIC        Orders Placed This Encounter    URINE CULTURE HOLD SAMPLE    URINALYSIS W/ RFLX MICROSCOPIC    REFERRAL TO NEUROSURGERY    diph,Kim,Yadi,,Tet Vac-PF (ADACEL) 2 Lf-(2.5-5-3-5 mcg)-5Lf/0.5 mL susp    amitriptyline (ELAVIL) 50 mg tablet    promethazine (PHENERGAN) 25 mg tablet       orthostasis much improved off lisinopril/hctz, and her BP is not too high  Continue to monitor  Advised to try off the lasix. Shouldn't need it for her intracranial pressure, as she has a functioning  shunt anyway  Keep us updated on portal    The migraines are in a rebound pattern; this was explained. Needs to detox off tylenol/fioricet for 2 weeks. See plan below  She is willing to try this. I do think she needs MRI brain and l-spine, maybe t-spine  Neurosurgeon needs to be present for safety with programmable  shunt. Refer [facility] neurosurgery for help.     Does also have fibromyalgia  Encouraged daily exercise  Add amitriptyline (more for headaches than fibro but might help both)    Did not discuss this today but I am thinking she might benefit from a pain psychologist to help her through all these painful health problems    Cut out or at least cut back on trazodone for chronic insomnia since starting amitriptyline    Checking urine tests to make sure hematuria resolved  myrbetric really helps her urinary frequency. Might try off it later after on amitripytyline, might not need both? Patient Instructions     Ok to try off the lasix and see how you feel    For your migraines  Try off tylenol, fioricet and caffeine for 2 full weeks  Then, you can take them 2 days out of the week if you need to  Use phenergan (promethazine) as needed for headache, hopefully you will sleep a lot  We will also put you on amitriptyline 50mg nightly for migraine prevention  I think you won't need trazodone with this, or maybe only 50mg    Try to get into physical therapy asap    I will try to get your endocrine records so I know what labs you need for your adrenal gland and thyroid   Discussed the diagnosis and plan and she expressed understanding. Follow-up and Dispositions    · Return in about 4 weeks (around 2/2/2021) for Follow up.        Cris Rosen MD

## 2021-01-05 ENCOUNTER — OFFICE VISIT (OUTPATIENT)
Dept: FAMILY MEDICINE CLINIC | Age: 46
End: 2021-01-05
Payer: MEDICAID

## 2021-01-05 VITALS
HEART RATE: 87 BPM | SYSTOLIC BLOOD PRESSURE: 130 MMHG | HEIGHT: 65 IN | OXYGEN SATURATION: 99 % | BODY MASS INDEX: 39.05 KG/M2 | WEIGHT: 234.4 LBS | RESPIRATION RATE: 18 BRPM | TEMPERATURE: 97.4 F | DIASTOLIC BLOOD PRESSURE: 78 MMHG

## 2021-01-05 DIAGNOSIS — M79.7 FIBROMYALGIA: ICD-10-CM

## 2021-01-05 DIAGNOSIS — R31.0 HEMATURIA, GROSS: ICD-10-CM

## 2021-01-05 DIAGNOSIS — I10 ESSENTIAL HYPERTENSION: ICD-10-CM

## 2021-01-05 DIAGNOSIS — E66.01 SEVERE OBESITY (HCC): ICD-10-CM

## 2021-01-05 DIAGNOSIS — M54.9 CHRONIC BACK PAIN, UNSPECIFIED BACK LOCATION, UNSPECIFIED BACK PAIN LATERALITY: ICD-10-CM

## 2021-01-05 DIAGNOSIS — M48.062 SPINAL STENOSIS OF LUMBAR REGION WITH NEUROGENIC CLAUDICATION: ICD-10-CM

## 2021-01-05 DIAGNOSIS — F51.05 INSOMNIA DUE TO OTHER MENTAL DISORDER: ICD-10-CM

## 2021-01-05 DIAGNOSIS — Z98.2 S/P VP SHUNT: ICD-10-CM

## 2021-01-05 DIAGNOSIS — T39.95XA ANALGESIC REBOUND HEADACHE: ICD-10-CM

## 2021-01-05 DIAGNOSIS — G93.2 IDIOPATHIC INTRACRANIAL HYPERTENSION: ICD-10-CM

## 2021-01-05 DIAGNOSIS — G89.29 CHRONIC BACK PAIN, UNSPECIFIED BACK LOCATION, UNSPECIFIED BACK PAIN LATERALITY: ICD-10-CM

## 2021-01-05 DIAGNOSIS — F99 INSOMNIA DUE TO OTHER MENTAL DISORDER: ICD-10-CM

## 2021-01-05 DIAGNOSIS — E11.9 DIABETES MELLITUS TYPE 2, NONINSULIN DEPENDENT (HCC): ICD-10-CM

## 2021-01-05 DIAGNOSIS — I95.1 ORTHOSTATIC HYPOTENSION: Primary | ICD-10-CM

## 2021-01-05 DIAGNOSIS — G44.40 ANALGESIC REBOUND HEADACHE: ICD-10-CM

## 2021-01-05 LAB
APPEARANCE UR: CLEAR
BILIRUB UR QL: NEGATIVE
COLOR UR: NORMAL
GLUCOSE UR STRIP.AUTO-MCNC: NEGATIVE MG/DL
HGB UR QL STRIP: NEGATIVE
KETONES UR QL STRIP.AUTO: NEGATIVE MG/DL
LEUKOCYTE ESTERASE UR QL STRIP.AUTO: NEGATIVE
NITRITE UR QL STRIP.AUTO: NEGATIVE
PH UR STRIP: 7 [PH] (ref 5–8)
PROT UR STRIP-MCNC: NEGATIVE MG/DL
SP GR UR REFRACTOMETRY: 1.01 (ref 1–1.03)
UR CULT HOLD, URHOLD: NORMAL
UROBILINOGEN UR QL STRIP.AUTO: 0.2 EU/DL (ref 0.2–1)

## 2021-01-05 PROCEDURE — 99214 OFFICE O/P EST MOD 30 MIN: CPT | Performed by: FAMILY MEDICINE

## 2021-01-05 RX ORDER — AMITRIPTYLINE HYDROCHLORIDE 50 MG/1
50 TABLET, FILM COATED ORAL
Qty: 30 TAB | Refills: 3 | Status: SHIPPED | OUTPATIENT
Start: 2021-01-05 | End: 2021-01-09

## 2021-01-05 RX ORDER — PROMETHAZINE HYDROCHLORIDE 25 MG/1
25 TABLET ORAL
Qty: 120 TAB | Refills: 0 | Status: SHIPPED | OUTPATIENT
Start: 2021-01-05 | End: 2021-01-09 | Stop reason: SDUPTHER

## 2021-01-05 NOTE — LETTER
2021 10:45 AM 
 
RE:    Jany [place] [address] [facility] [address] [facility] [address] [id number] 
 [address] Regla Ontiveros [phone] Dear Dr. Regla Ontiveros or first available, Thank you for agreeing to see [facility] I am referring my patient to you for evaluation of needing MRI's (brain, l-spine, possibly t-spine) but has a programmable  shunt from  and needs neurosurgeon available during MRI (from what she has been told). I think it would be good for her to have regular check ups with you about her shunt. She has a history of intracranial hypertension. Recent LP done by her neurologist, Dr. Valorie Enciso, had normal opening pressure. She has rebound migraines, fibromyalgia, and known lumbar spinal stenosis with neurogenic claudication. I appreciate your assistance in Ms[facility] care  and look forward to your findings and recommendations. Sincerely, Amaya Simmons MD

## 2021-01-05 NOTE — LETTER
2021 10:37 AM 
 
RE:    [place] [address] [address] [facility] [id number]  [address] Leeann Sánchez [phone] Dear Dr. Leeann Sánchez, Thank you for agreeing to see [place] I am seeking to coordinate care with you. I am also referring her to neurosurgery with [facility] (to manage her  shunt and to help her get MRIs safely related to her chronic headaches and back pain). Could your office please fax your note to us at [phone]? I know Ms. Elicia Stauffer is planning to get her labs done soon, and I hope to see those results as well. I appreciate your assistance in Ms. Cheyenne Bray care  and look forward to your findings and recommendations. Sincerely, Kayley Sánchez MD

## 2021-01-05 NOTE — PROGRESS NOTES
Chief Complaint   Patient presents with    Pain (Chronic)     1. Have you been to the ER, urgent care clinic since your last visit? Hospitalized since your last visit? No    2. Have you seen or consulted any other health care providers outside of the [address] [place] [address] since your last visit? Include any pap smears or colon screening.  No

## 2021-01-05 NOTE — PATIENT INSTRUCTIONS
Ok to try off the lasix and see how you feel For your migraines Try off tylenol, fioricet and caffeine for 2 full weeks Then, you can take them 2 days out of the week if you need to Use phenergan (promethazine) as needed for headache, hopefully you will sleep a lot We will also put you on amitriptyline 50mg nightly for migraine prevention I think you won't need trazodone with this, or maybe only 50mg Try to get into physical therapy asap I will try to get your endocrine records so I know what labs you need for your adrenal gland and thyroid Headache: Care Instructions Your Care Instructions Headaches have many possible causes. Most headaches aren't a sign of a more serious problem, and they will get better on their own. Home treatment may help you feel better faster. The doctor has checked you carefully, but problems can develop later. If you notice any problems or new symptoms, get medical treatment right away. Follow-up care is a key part of your treatment and safety. Be sure to make and go to all appointments, and call your doctor if you are having problems. It's also a good idea to know your test results and keep a list of the medicines you take. How can you care for yourself at home? · Do not drive if you have taken a prescription pain medicine. · Rest in a quiet, dark room until your headache is gone. Close your eyes and try to relax or go to sleep. Don't watch TV or read. · Put a cold, moist cloth or cold pack on the painful area for 10 to 20 minutes at a time. Put a thin cloth between the cold pack and your skin. · Use a warm, moist towel or a heating pad set on low to relax tight shoulder and neck muscles. · Have someone gently massage your neck and shoulders. · Take pain medicines exactly as directed. ? If the doctor gave you a prescription medicine for pain, take it as prescribed. ? If you are not taking a prescription pain medicine, ask your doctor if you can take an over-the-counter medicine. · Be careful not to take pain medicine more often than the instructions allow, because you may get worse or more frequent headaches when the medicine wears off. · Do not ignore new symptoms that occur with a headache, such as a fever, weakness or numbness, vision changes, or confusion. These may be signs of a more serious problem. To prevent headaches · Keep a headache diary so you can figure out what triggers your headaches. Avoiding triggers may help you prevent headaches. Record when each headache began, how long it lasted, and what the pain was like (throbbing, aching, stabbing, or dull). Write down any other symptoms you had with the headache, such as nausea, flashing lights or dark spots, or sensitivity to bright light or loud noise. Note if the headache occurred near your period. List anything that might have triggered the headache, such as certain foods (chocolate, cheese, wine) or odors, smoke, bright light, stress, or lack of sleep. · Find healthy ways to deal with stress. Headaches are most common during or right after stressful times. Take time to relax before and after you do something that has caused a headache in the past. 
· Try to keep your muscles relaxed by keeping good posture. Check your jaw, face, neck, and shoulder muscles for tension, and try relaxing them. When sitting at a desk, change positions often, and stretch for 30 seconds each hour. · Get plenty of sleep and exercise. · Eat regularly and well. Long periods without food can trigger a headache. · Treat yourself to a massage. Some people find that regular massages are very helpful in relieving tension. · Limit caffeine by not drinking too much coffee, tea, or soda. But don't quit caffeine suddenly, because that can also give you headaches. · Reduce eyestrain from computers by blinking frequently and looking away from the computer screen every so often. Make sure you have proper eyewear and that your monitor is set up properly, about an arm's length away. · Seek help if you have depression or anxiety. Your headaches may be linked to these conditions. Treatment can both prevent headaches and help with symptoms of anxiety or depression. When should you call for help? Call 911 anytime you think you may need emergency care. For example, call if: 
  · You have signs of a stroke. These may include: 
? Sudden numbness, paralysis, or weakness in your face, arm, or leg, especially on only one side of your body. ? Sudden vision changes. ? Sudden trouble speaking. ? Sudden confusion or trouble understanding simple statements. ? Sudden problems with walking or balance. ? A sudden, severe headache that is different from past headaches. Call your doctor now or seek immediate medical care if: 
  · You have a new or worse headache.  
  · Your headache gets much worse. Where can you learn more? Go to http://www.taylor.com/ Enter M271 in the search box to learn more about \"Headache: Care Instructions. \" Current as of: November 20, 2019               Content Version: 12.6 © 6074-3299 [employer]. Care instructions adapted under license by [employer] (which disclaims liability or warranty for this information). If you have questions about a medical condition or this instruction, always ask your healthcare professional. [place] [address] any warranty or liability for your use of this information.

## 2021-01-06 RX ORDER — ALBUTEROL SULFATE 90 UG/1
2 AEROSOL, METERED RESPIRATORY (INHALATION)
Qty: 1 INHALER | Refills: 0 | Status: SHIPPED | OUTPATIENT
Start: 2021-01-06 | End: 2021-03-23 | Stop reason: SDUPTHER

## 2021-01-06 NOTE — TELEPHONE ENCOUNTER
Last visit 01/05/2021 MD Castellano   Next appointment 4 weeks (02/2021) - Nothing scheduled   Previous refill encounter(s)   11/03/2020 Pro-Air HFA INH #1     Requested Prescriptions     Pending Prescriptions Disp Refills    albuterol (PROVENTIL HFA, VENTOLIN HFA, PROAIR HFA) 90 mcg/actuation inhaler 1 Inhaler 0     Sig: Take 2 Puffs by inhalation every six (6) hours as needed for Wheezing or Cough.

## 2021-01-09 RX ORDER — AMITRIPTYLINE HYDROCHLORIDE 50 MG/1
100 TABLET, FILM COATED ORAL
COMMUNITY
Start: 2021-01-09 | End: 2021-01-22 | Stop reason: SDUPTHER

## 2021-01-09 RX ORDER — PROMETHAZINE HYDROCHLORIDE 25 MG/1
TABLET ORAL
COMMUNITY
Start: 2021-01-09

## 2021-01-11 DIAGNOSIS — I10 ESSENTIAL HYPERTENSION: ICD-10-CM

## 2021-01-11 RX ORDER — LISINOPRIL 10 MG/1
10 TABLET ORAL DAILY
COMMUNITY
Start: 2021-01-11 | End: 2021-01-15

## 2021-01-15 DIAGNOSIS — E66.01 SEVERE OBESITY (HCC): Primary | ICD-10-CM

## 2021-01-15 DIAGNOSIS — M48.062 SPINAL STENOSIS OF LUMBAR REGION WITH NEUROGENIC CLAUDICATION: ICD-10-CM

## 2021-01-15 DIAGNOSIS — R42 VERTIGO: ICD-10-CM

## 2021-01-15 DIAGNOSIS — I10 ESSENTIAL HYPERTENSION: ICD-10-CM

## 2021-01-15 RX ORDER — LISINOPRIL 10 MG/1
5 TABLET ORAL DAILY
COMMUNITY
Start: 2021-01-15 | End: 2021-03-23 | Stop reason: SDUPTHER

## 2021-01-18 ENCOUNTER — OFFICE VISIT (OUTPATIENT)
Dept: NEUROLOGY | Age: 46
End: 2021-01-18
Payer: MEDICAID

## 2021-01-18 ENCOUNTER — TELEPHONE (OUTPATIENT)
Dept: FAMILY MEDICINE CLINIC | Age: 46
End: 2021-01-18

## 2021-01-18 VITALS
SYSTOLIC BLOOD PRESSURE: 130 MMHG | OXYGEN SATURATION: 98 % | WEIGHT: 234 LBS | BODY MASS INDEX: 38.99 KG/M2 | HEART RATE: 78 BPM | DIASTOLIC BLOOD PRESSURE: 80 MMHG | HEIGHT: 65 IN | RESPIRATION RATE: 16 BRPM

## 2021-01-18 DIAGNOSIS — M79.661 PAIN IN BOTH LOWER LEGS: Primary | ICD-10-CM

## 2021-01-18 DIAGNOSIS — M79.662 PAIN IN BOTH LOWER LEGS: Primary | ICD-10-CM

## 2021-01-18 DIAGNOSIS — M79.661 PAIN IN BOTH LOWER LEGS: ICD-10-CM

## 2021-01-18 DIAGNOSIS — M48.061 SPINAL STENOSIS OF LUMBAR REGION, UNSPECIFIED WHETHER NEUROGENIC CLAUDICATION PRESENT: ICD-10-CM

## 2021-01-18 DIAGNOSIS — M79.662 PAIN IN BOTH LOWER LEGS: ICD-10-CM

## 2021-01-18 PROCEDURE — 95910 NRV CNDJ TEST 7-8 STUDIES: CPT | Performed by: PSYCHIATRY & NEUROLOGY

## 2021-01-18 PROCEDURE — 95886 MUSC TEST DONE W/N TEST COMP: CPT | Performed by: PSYCHIATRY & NEUROLOGY

## 2021-01-18 RX ORDER — CYCLOBENZAPRINE HCL 10 MG
10 TABLET ORAL
Qty: 90 TAB | Refills: 1 | Status: SHIPPED | OUTPATIENT
Start: 2021-01-18 | End: 2021-03-30

## 2021-01-18 NOTE — TELEPHONE ENCOUNTER
Pt came today and requesting a new Rx for walker . Pt said that she lost the walker between and she is requesting a new RX for walker. Pt can be reached at [phone].  Thank you

## 2021-01-18 NOTE — PROGRESS NOTES
[address] [facility] Neurology [facility]  [address]  Phone [phone] Fax [phone]  Test Date:  2021    Patient: Khoa Velasquez : 1975 Physician: Alejandro Israel MD   Sex: Male Height: 5' 5\" Ref Phys: Alejandro Israel md   ID#: [id number] Weight: 234 lbs. Technician: Olga Pierre     Patient Complaints:  BLE    Patient History / Exam:  81-year-old female was being evaluated for pain in her lower extremities after she stand or walks for more than 10 to 15 minutes i.e. neurogenic claudication. No numbness or weakness in the lower extremities. On exam: Alert and fully oriented for cranial nerves II through XII intact with muscle tone and bulk normal per strength normal in both upper and lower extremities. DTRs 2/2 and symmetric. Toes downgoing. Sensation intact. Gait normal.      NCV & EMG Findings:  All nerve conduction studies were within normal limits. All left vs. right side differences were within normal limits. All F Wave latencies were within normal limits. All F Wave left vs. right side latency differences were within normal limits. All examined muscles (as indicated in the following table) showed no evidence of electrical instability. Impression:    The electrodiagnostic testing is normal.  There is no evidence to suggest a large fiber peripheral neuropathy or a lumbosacral radiculopathy. Recommendations:  Patient symptoms may be due to spinal canal stenosis.   Consider imaging of the lumbar spine.    ___________________________  Alejandro Israel MD        Nerve Conduction Studies  Anti Sensory Summary Table     Stim Site NR Peak (ms) Norm Peak (ms) P-T Amp (µV) Norm P-T Amp Onset (ms) Site1 Site2 Delta-P (ms) Dist (cm) Carlos A (m/s) Norm Carlos A (m/s)   Left Sup Peroneal Anti Sensory (Ant Lat Mall)  30.2°C   14 cm    2.5 <4.4 10.1 >5.0 2.1 14 cm Ant Lat Mall 2.5 14.0 56 >32   Right Sup Peroneal Anti Sensory (Ant Lat Mall)  30.1°C   14 cm    2.7 <4.4 8.7 >5.0 2.0 14 cm Ant Lat Mall 2.7 14.0 52 >32   Left Sural Anti Sensory (Lat Mall)  29.3°C   Calf    3.3 <4.0 8.6 >5.0 2.8 Calf Lat Mall 3.3 14.0 42 >35   Right Sural Anti Sensory (Lat Mall)  29.8°C   Calf    3.7 <4.0 11.3 >5.0 3.2 Calf Lat Mall 3.7 14.0 38 >35     Motor Summary Table     Stim Site NR Onset (ms) Norm Onset (ms) O-P Amp (mV) Norm O-P Amp Site1 Site2 Delta-0 (ms) Dist (cm) Carlos A (m/s) Norm Carlos A (m/s)   Left Peroneal Motor (Ext Dig Brev)  30°C   Ankle    3.4 <6.1 5.0 >2.5 B Fib Ankle 5.7 30.0 53 >38   B Fib    9.1  4.3  Poplt B Fib 2.1 10.0 48 >40   Poplt    11.2  3.9          Right Peroneal Motor (Ext Dig Brev)  29.4°C   Ankle    3.6 <6.1 6.8 >2.5 B Fib Ankle 5.7 28.0 49 >38   B Fib    9.3  5.6  Poplt B Fib 1.6 10.0 63 >40   Poplt    10.9  5.4          Left Tibial Motor (Abd Sanchez Brev)  30°C   Ankle    3.2 <6.1 9.1 >3.0 Knee Ankle 7.3 38.0 52 >35   Knee    10.5  5.7          Right Tibial Motor (Abd Sanchez Brev)  29.6°C   Ankle    3.6 <6.1 10.7 >3.0 Knee Ankle 6.2 38.0 61 >35   Knee    9.8  5.9            F Wave Studies     NR F-Lat (ms) Lat Norm (ms) L-R F-Lat (ms) L-R Lat Norm   Left Tibial (Mrkrs) (Abd Hallucis)  30°C      48.48 <61 0.00 <5.7   Right Tibial (Mrkrs) (Abd Hallucis)  29.8°C      48.48 <61 0.00 <5.7     EMG     Side Muscle Nerve Root Ins Act Fibs Psw Amp Dur Poly Recrt Int Pat Comment   Left AntTibialis Dp Br Peronel L4-5 Nml Nml Nml Nml Nml 0 Nml Nml    Left Peroneus Long Sup Br Peronel L5-S1 Nml Nml Nml Nml Nml 0 Nml Nml    Left Gastroc Tibial S1-2 Nml Nml Nml Nml Nml 0 Nml Nml    Left Flex Dig Long Tibial L5-S2 Nml Nml Nml Nml Nml 0 Nml Nml    Left VastusLat Femoral L2-4 Nml Nml Nml Nml Nml 0 Nml Nml    Right AntTibialis Dp Br Peronel L4-5 Nml Nml Nml Nml Nml 0 Nml Nml    Right Peroneus Long Sup Br Peronel L5-S1 Nml Nml Nml Nml Nml 0 Nml Nml    Right Gastroc Tibial S1-2 Nml Nml Nml Nml Nml 0 Nml Nml    Right Flex Dig Long Tibial L5-S2 Nml Nml Nml Nml Nml 0 Nml Nml    Right VastusLat Femoral L2-4 Nml Nml Nml Nml Nml 0 Nml Nml    Right Lumbo Parasp Low Rami L5-S1 Nml Nml Nml               Waveforms:

## 2021-01-19 ENCOUNTER — HOSPITAL ENCOUNTER (OUTPATIENT)
Dept: PHYSICAL THERAPY | Age: 46
Discharge: HOME | End: 2021-01-19
Payer: MEDICAID

## 2021-01-19 PROCEDURE — 97162 PT EVAL MOD COMPLEX 30 MIN: CPT | Performed by: PHYSICAL THERAPIST

## 2021-01-19 NOTE — PROGRESS NOTES
Physical Therapy at [facility],   a part of  [facility]  [facility] [address] [facility], [address] [place] [address] Drive  Phone: [phone]  Fax: [phone]    Plan of Care/ Statement of Necessity for Physical Therapy Services 2-15    Patient name: Catarino Zuniga  : 1975  Provider#: [id number]  Referral source: Harper Robertson MD      Medical/Treatment Diagnosis: Spinal stenosis, lumbar region without neurogenic claudication [M48.061]     Prior Hospitalization: see medical history     Comorbidities: POTS, FM, obesity  Prior Level of Function: see initial eval  Medications: Verified on Patient Summary List    Start of Care: 21      Onset Date:        The Plan of Care and following information is based on the information from the initial evaluation. Assessment/ key information: Patient presents with chronic back pain that is complicated by PMH including POTS and FM. She has significant limitations in functional tasks including prolonged standing and walking and has been unable to work for the past 11 months due to the back pain. Evaluation Complexity History MEDIUM  Complexity : 1-2 comorbidities / personal factors will impact the outcome/ POC ; Examination MEDIUM Complexity : 3 Standardized tests and measures addressing body structure, function, activity limitation and / or participation in recreation  ;Presentation MEDIUM Complexity : Evolving with changing characteristics  ; Clinical Decision Making MEDIUM Complexity : FOTO score of 26-74  Overall Complexity Rating: MEDIUM    Problem List: pain affecting function, decrease ROM, decrease strength, impaired gait/ balance, decrease ADL/ functional abilitiies, decrease activity tolerance, decrease flexibility/ joint mobility and decrease transfer abilities   Treatment Plan may include any combination of the following: Therapeutic exercise, Therapeutic activities, Neuromuscular re-education, Physical agent/modality, Gait/balance training, Manual therapy, Patient education, Self Care training, Functional mobility training, Home safety training and Stair training  Patient / Family readiness to learn indicated by: asking questions, trying to perform skills and interest  Persons(s) to be included in education: patient (P)  Barriers to Learning/Limitations: None  Patient Goal (s): I want to be able to walk with less pain.   Patient Self Reported Health Status: excellent  Rehabilitation Potential: excellent    Short Term Goals: To be accomplished in 4 weeks:  1. Patient will be able to stand for 15 minutes with <3/10 back pain. 2. Patient will be able to walk two blocks with <3/10 back pain. 3. Patient will be able to bend forward and tie her shoes without pain or limitation. Long Term Goals: To be accomplished in 12 weeks:  1. Patient will be able to stand for 30 minutes without back pain or limitation. 2. Patient will be able to walk 1/4 mile without back pain or limitation. 3. Patient will be able to bend forward and  10# from the floor without pain or limitation. Frequency / Duration: Patient to be seen 2 times per week for 12 weeks. Patient/ Caregiver education and instruction: self care, activity modification and exercises    [x]  Plan of care has been reviewed with ELIAS Bruno, PT 1/19/2021     ________________________________________________________________________    I certify that the above Therapy Services are being furnished while the patient is under my care. I agree with the treatment plan and certify that this therapy is necessary.     [de-identified] Signature:____________________  Date:____________Time: _________

## 2021-01-19 NOTE — PROGRESS NOTES
PT INITIAL EVALUATION NOTE 2-15    Patient Name: Catarino Zuniga  Date:2021  : 1975  [x]  Patient  Verified  Payor: Cassy Benson / Plan: [facility] CCCP / Product Type: Managed Care Medicaid /    In time:10:15 AM  Out time:11:00 AM  Total Treatment Time (min): 45  Visit #: 1     Treatment Area: Spinal stenosis, lumbar region without neurogenic claudication [M48.061]    SUBJECTIVE  Pain Level (0-10 scale): 5/10  Any medication changes, allergies to medications, adverse drug reactions, diagnosis change, or new procedure performed?: [] No    [x] Yes (see summary sheet for update)  Subjective:     Thoracic/lumbar back pain  PLOF: No limitations with walking, standing, bending forward  Mechanism of Injury: Insidious, over the past year. The pain is concentrated at the tailbone, mid-back, and upper trapezius. Previous Treatment/Compliance: She has tried a home stretching program without any relief. PMHx/Surgical Hx: FM, POTS, obesity  Work Hx: Currently unemployed. Previously worked as a . She had to stop working in 2020 when the pain became too much to work through. Living Situation: lives in a two story home with   Pt Goals: to reduce pain and return to working  Barriers: chronicity, PMH  Motivation: motivated  Substance use: n/a   FABQ Score: n/a  Cognition: A & O x 4        OBJECTIVE/EXAMINATION  Gait and Functional Mobility:    Gait: Slow speed, antalgic  Transfers: Requires UE support, slow speed  Bed mobility: Painful rolling in either direction  Palpation: TTP along B upper trapezius, T4-T6 paraspinals, T10-L1, L5-S2  Joint Mobility:NT        Lumbar AROM:        R  L  Flexion    Lacking 24\" from floor        Extension   p!         Side Bending   Lacking 6\" from knee B        Rotation   WNL          Aberrant movements:  Painful arc: [x] Yes  [] No  Instability catch: [] Yes  [x] No  Difficulty returning from flexion: [x] Yes  [] No  Reversal of lumbopelvic rhythm: [] Yes  [x] No      Strength: All lumbar myotomes: >4/5  Neurological: Sensation: Intact  Special Tests:        Slump:  Positive B   SLR: Positive B        Modality rationale: decrease pain and increase tissue extensibility to improve the patients ability to walk without pain   Min Type Additional Details    [] Estim: []Att   []Unatt        []TENS instruct                  []IFC  []Premod   []NMES                     []Other:  []w/US   []w/ice   []w/heat  Position:  Location:    []  Traction: [] Cervical       []Lumbar                       [] Prone          []Supine                       []Intermittent   []Continuous Lbs:  [] before manual  [] after manual  []w/heat    []  Ultrasound: []Continuous   [] Pulsed at:                            []1MHz   []3MHz Location:  W/cm2:    []  Paraffin         Location:  []w/heat   10 []  Ice     [x]  Heat  []  Ice massage Position: seated  Location:neck, back    []  Laser  []  Other: Position:  Location:    []  Vasopneumatic Device Pressure:       [] lo [] med [] hi   Temperature:    [x] Skin assessment post-treatment:  [x]intact []redness- no adverse reaction    []redness  adverse reaction:           With   [] TE   [] TA   [] Neuro   [] SC   [] other: Patient Education: [x] Review HEP    [] Progressed/Changed HEP based on:   [] positioning   [] body mechanics   [] transfers   [] heat/ice application    [] other:        Other Objective/Functional Measures:  BP, HR:    Sittin/94, 110   Supine: 126/84, 94   Supine->sit: 136/94, 118    Pain Level (0-10 scale) post treatment: 3      ASSESSMENT:      [x]  See Plan of Care      General Med, PT , DPT, OCS, Cert.  DN   2021

## 2021-01-20 DIAGNOSIS — G89.29 CHRONIC BACK PAIN, UNSPECIFIED BACK LOCATION, UNSPECIFIED BACK PAIN LATERALITY: ICD-10-CM

## 2021-01-20 DIAGNOSIS — M79.7 FIBROMYALGIA: ICD-10-CM

## 2021-01-20 DIAGNOSIS — M54.9 CHRONIC BACK PAIN, UNSPECIFIED BACK LOCATION, UNSPECIFIED BACK PAIN LATERALITY: ICD-10-CM

## 2021-01-20 DIAGNOSIS — M51.36 DEGENERATIVE DISC DISEASE, LUMBAR: ICD-10-CM

## 2021-01-20 NOTE — TELEPHONE ENCOUNTER
RYAN Bran,    Request for new rx for gabapentin. Check . Thanks, Mike Landin    Last Visit: 1/5/21 MD Castellano  Next Appointment: Not scheduled  Previous Refill Encounter(s): 11/27/20 180 + 1    Requested Prescriptions     Pending Prescriptions Disp Refills    gabapentin (NEURONTIN) 600 mg tablet 180 Tab 1     Sig: Take 2 Tabs by mouth three (3) times daily. Max Daily Amount: 3,600 mg.

## 2021-01-20 NOTE — TELEPHONE ENCOUNTER
Call placed to patient name and  verified. Advised patient RX was reprinted. She requested I fax it to [facility] [address] FARRAH RODGERS on [facility] in [facility]. Order and demo sheet printed.

## 2021-01-25 RX ORDER — AMITRIPTYLINE HYDROCHLORIDE 50 MG/1
100 TABLET, FILM COATED ORAL
Qty: 60 TAB | Refills: 3 | Status: SHIPPED | OUTPATIENT
Start: 2021-01-25 | End: 2022-01-12 | Stop reason: SDUPTHER

## 2021-01-28 ENCOUNTER — HOSPITAL ENCOUNTER (OUTPATIENT)
Dept: PHYSICAL THERAPY | Age: 46
Discharge: HOME | End: 2021-01-28
Payer: MEDICAID

## 2021-01-28 PROCEDURE — 97110 THERAPEUTIC EXERCISES: CPT | Performed by: PHYSICAL MEDICINE & REHABILITATION

## 2021-01-28 NOTE — PROGRESS NOTES
PT DAILY TREATMENT NOTE - [facility] 2-15    Patient Name: Tresa Smith  Date:2021  : 1975  [x]  Patient  Verified  Payor: Kimberly Parents / Plan: [facility] [address] [facility] CCCP / Product Type: Managed Care Medicaid /    In time:955  Out time:1055  Total Treatment Time (min): 60  Total Timed Codes (min): 50  1:1 Treatment Time ( only):    Visit #: 2      Treatment Area: Spinal stenosis, lumbar region without neurogenic claudication [M48.061]    SUBJECTIVE  Pain Level (0-10 scale): 4  Any medication changes, allergies to medications, adverse drug reactions, diagnosis change, or new procedure performed?: [x] No    [] Yes (see summary sheet for update)  Subjective functional status/changes:   [] No changes reported  Patient reports back hasn't been feeling well lately, bothers her after standing for more than a few minutes. OBJECTIVE    Modality rationale: decrease pain and increase tissue extensibility to improve the patients ability to perform ADLs, sit, stand for prolonged periods, ambulate without pain, transfer, lift, carry.    Min Type Additional Details    [] Estim: []Att   []Unatt        []TENS instruct                  []IFC  []Premod   []NMES                     []Other:  []w/US   []w/ice   []w/heat  Position:  Location:    []  Traction: [] Cervical       []Lumbar                       [] Prone          []Supine                       []Intermittent   []Continuous Lbs:  [] before manual  [] after manual  []w/heat    []  Ultrasound: []Continuous   [] Pulsed at:                           []1MHz   []3MHz Location:  W/cm2:    [] Paraffin         Location:   []w/heat   10 []  Ice     [x]  Heat  []  Ice massage Position: seated  Location: neck, low back    []  Laser  []  Other: Position:  Location:      []  Vasopneumatic Device Pressure:       [] lo [] med [] hi   Temperature:      [x] Skin assessment post-treatment:  [x]intact []redness- no adverse reaction    []redness - adverse reaction:     50 min Therapeutic Exercise:  [x] See flow sheet :   Rationale: increase ROM and increase strength to improve the patients ability to perform ADLs, sit, stand for prolonged periods, ambulate without pain, transfer, lift, carry. With   [x] TE   [] TA   [] neuro   [] other: Patient Education: [x] Review HEP    [] Progressed/Changed HEP based on:   [] positioning   [] body mechanics   [] transfers   [] heat/ice application    [] other:      Other Objective/Functional Measures: Patient was able to tolerate all additions to exercises and stretches. Started patient with LAQs but were not challenging, so progressed to mini squats and she was able to tolerate 2 sets with mod fatigue and no pain. Patient had some discomfort with R LS stretch with a shunt in that area, but was able to still perform the stretch with reduced range of the stretch. Pain Level (0-10 scale) post treatment: 4    ASSESSMENT/Changes in Function:     Patient will continue to benefit from skilled PT services to modify and progress therapeutic interventions, address functional mobility deficits, address ROM deficits, address strength deficits, analyze and address soft tissue restrictions, analyze and cue movement patterns, analyze and modify body mechanics/ergonomics and assess and modify postural abnormalities to attain remaining goals. []  See Plan of Care  []  See progress note/recertification  []  See Discharge Summary         Progress towards goals / Updated goals:  Patient is demonstrating independence with HEP and will continue to progress with further skilled PT to focus on improving strength, pain, standing/walking tolerance and pain.     PLAN  [x]  Upgrade activities as tolerated     [x]  Continue plan of care  [x]  Update interventions per flow sheet       []  Discharge due to:_  []  Other:_      MACIE Vicente 1/28/2021   3 TE

## 2021-02-08 DIAGNOSIS — M79.661 PAIN IN BOTH LOWER LEGS: ICD-10-CM

## 2021-02-08 DIAGNOSIS — M79.7 FIBROMYALGIA: Primary | ICD-10-CM

## 2021-02-08 DIAGNOSIS — M79.662 PAIN IN BOTH LOWER LEGS: ICD-10-CM

## 2021-02-08 RX ORDER — PREGABALIN 150 MG/1
200 CAPSULE ORAL 3 TIMES DAILY
Qty: 90 CAP | Refills: 1 | Status: SHIPPED | OUTPATIENT
Start: 2021-02-08 | End: 2021-04-09

## 2021-02-11 ENCOUNTER — APPOINTMENT (OUTPATIENT)
Dept: PHYSICAL THERAPY | Age: 46
End: 2021-02-11

## 2021-02-11 DIAGNOSIS — G93.2 IDIOPATHIC INTRACRANIAL HYPERTENSION: ICD-10-CM

## 2021-02-11 DIAGNOSIS — F41.1 GAD (GENERALIZED ANXIETY DISORDER): ICD-10-CM

## 2021-02-11 DIAGNOSIS — K21.9 GASTROESOPHAGEAL REFLUX DISEASE WITHOUT ESOPHAGITIS: ICD-10-CM

## 2021-02-11 DIAGNOSIS — F51.05 INSOMNIA DUE TO OTHER MENTAL DISORDER: ICD-10-CM

## 2021-02-11 DIAGNOSIS — M79.7 FIBROMYALGIA: ICD-10-CM

## 2021-02-11 DIAGNOSIS — F99 INSOMNIA DUE TO OTHER MENTAL DISORDER: ICD-10-CM

## 2021-02-12 DIAGNOSIS — I10 ESSENTIAL HYPERTENSION: ICD-10-CM

## 2021-02-12 RX ORDER — BUSPIRONE HYDROCHLORIDE 7.5 MG/1
7.5 TABLET ORAL 3 TIMES DAILY
Qty: 90 TAB | Refills: 1 | Status: SHIPPED | OUTPATIENT
Start: 2021-02-12 | End: 2021-12-27 | Stop reason: SDUPTHER

## 2021-02-12 RX ORDER — OMEPRAZOLE 40 MG/1
40 CAPSULE, DELAYED RELEASE ORAL DAILY
Qty: 90 CAP | Refills: 1 | Status: SHIPPED | OUTPATIENT
Start: 2021-02-12 | End: 2022-01-26

## 2021-02-12 RX ORDER — TRAZODONE HYDROCHLORIDE 50 MG/1
150 TABLET ORAL
Qty: 270 TAB | Refills: 1 | Status: SHIPPED | OUTPATIENT
Start: 2021-02-12 | End: 2022-01-12 | Stop reason: SDUPTHER

## 2021-02-12 RX ORDER — DULOXETIN HYDROCHLORIDE 60 MG/1
60 CAPSULE, DELAYED RELEASE ORAL DAILY
Qty: 90 CAP | Refills: 1 | Status: SHIPPED | OUTPATIENT
Start: 2021-02-12 | End: 2022-01-12 | Stop reason: SDUPTHER

## 2021-02-12 RX ORDER — DULOXETIN HYDROCHLORIDE 30 MG/1
30 CAPSULE, DELAYED RELEASE ORAL DAILY
Qty: 90 CAP | Refills: 1 | Status: SHIPPED | OUTPATIENT
Start: 2021-02-12 | End: 2022-01-12 | Stop reason: SDUPTHER

## 2021-02-12 RX ORDER — ONDANSETRON 4 MG/1
4 TABLET, ORALLY DISINTEGRATING ORAL
Qty: 30 TAB | Refills: 0 | Status: SHIPPED | OUTPATIENT
Start: 2021-02-12 | End: 2021-04-21

## 2021-02-12 NOTE — TELEPHONE ENCOUNTER
Requests also for duloxetine 30mg & 60mg. (already pending)    Last Visit: 21 MD Castellano  Next Appointment: Not scheduled  Previous Refill Encounter(s): 21 120     Requested Prescriptions     Pending Prescriptions Disp Refills    promethazine (PHENERGAN) 25 mg tablet 120 Tab 0     Si-2 tabs PO every 6 hours prn migraine

## 2021-02-19 DIAGNOSIS — G93.2 IDIOPATHIC INTRACRANIAL HYPERTENSION: ICD-10-CM

## 2021-02-19 DIAGNOSIS — G43.001 MIGRAINE WITHOUT AURA AND WITH STATUS MIGRAINOSUS, NOT INTRACTABLE: ICD-10-CM

## 2021-02-19 RX ORDER — FUROSEMIDE 20 MG/1
TABLET ORAL
Qty: 30 TAB | Refills: 0 | Status: SHIPPED | OUTPATIENT
Start: 2021-02-19 | End: 2021-09-24 | Stop reason: SDUPTHER

## 2021-02-19 RX ORDER — BUTALBITAL, ACETAMINOPHEN AND CAFFEINE 50; 325; 40 MG/1; MG/1; MG/1
TABLET ORAL
Qty: 30 TAB | Refills: 0 | Status: SHIPPED | OUTPATIENT
Start: 2021-02-19 | End: 2021-03-22

## 2021-02-23 DIAGNOSIS — G95.19 NEUROGENIC CLAUDICATION (HCC): Primary | ICD-10-CM

## 2021-02-24 DIAGNOSIS — I10 ESSENTIAL HYPERTENSION: ICD-10-CM

## 2021-02-24 NOTE — TELEPHONE ENCOUNTER
Last Visit: 21 MD Castellano  Next Appointment: Not scheduled  Previous Refill Encounter(s):   Lisinopril-  Historical provider  Promethazine historical provider     Requested Prescriptions     Pending Prescriptions Disp Refills    lisinopriL (PRINIVIL, ZESTRIL) 10 mg tablet 45 Tab 0     Sig: Take 0.5 Tabs by mouth daily.     promethazine (PHENERGAN) 25 mg tablet 60 Tab 0     Si-2 tabs PO every 6 hours prn migraine

## 2021-02-26 ENCOUNTER — TELEPHONE (OUTPATIENT)
Dept: NEUROLOGY | Age: 46
End: 2021-02-26

## 2021-02-26 NOTE — TELEPHONE ENCOUNTER
Spoke with patient and she stated she has gotten imaging with [facility] in the past and plans to get it through them.

## 2021-03-23 DIAGNOSIS — I10 ESSENTIAL HYPERTENSION: ICD-10-CM

## 2021-03-23 NOTE — TELEPHONE ENCOUNTER
Please route to covering provider. Last visit 01/05/2021 MD Castellano   Next appointment 04/14/2021 RYAN Hyde   Previous refill encounter(s)   01/06/2021 Albuterol HFA INH #1,   01/15/2021 Prinivil #45  Requested Prescriptions     Pending Prescriptions Disp Refills    albuterol (PROVENTIL HFA, VENTOLIN HFA, PROAIR HFA) 90 mcg/actuation inhaler 1 Inhaler 0     Sig: Take 2 Puffs by inhalation every six (6) hours as needed for Wheezing or Cough.  lisinopriL (PRINIVIL, ZESTRIL) 10 mg tablet 45 Tab 0     Sig: Take 0.5 Tabs by mouth daily.

## 2021-03-25 RX ORDER — ALBUTEROL SULFATE 90 UG/1
2 AEROSOL, METERED RESPIRATORY (INHALATION)
Qty: 1 INHALER | Refills: 0 | Status: SHIPPED | OUTPATIENT
Start: 2021-03-25 | End: 2022-04-01 | Stop reason: SDUPTHER

## 2021-03-25 RX ORDER — LISINOPRIL 10 MG/1
5 TABLET ORAL DAILY
Qty: 45 TAB | Refills: 0 | Status: SHIPPED
Start: 2021-03-25 | End: 2022-02-10

## 2021-03-25 NOTE — TELEPHONE ENCOUNTER
PCP: Doyle Barrera NP    Last appt: 2/1/2021  Future Appointments   Date Time Provider Lukasz Zapata   4/14/2021  1:30 PM Doyle Barrera NP [facility] AMB       Requested Prescriptions     Pending Prescriptions Disp Refills    albuterol (PROVENTIL HFA, VENTOLIN HFA, PROAIR HFA) 90 mcg/actuation inhaler 1 Inhaler 0     Sig: Take 2 Puffs by inhalation every six (6) hours as needed for Wheezing or Cough.  lisinopriL (PRINIVIL, ZESTRIL) 10 mg tablet 45 Tab 0     Sig: Take 0.5 Tabs by mouth daily.        Prior labs and Blood pressures:  BP Readings from Last 3 Encounters:   01/18/21 130/80   01/05/21 130/78   12/23/20 126/84     Lab Results   Component Value Date/Time    Sodium 136 12/13/2020 01:07 PM    Potassium 4.4 12/13/2020 01:07 PM    Chloride 102 12/13/2020 01:07 PM    CO2 28 12/13/2020 01:07 PM    Anion gap 6 12/13/2020 01:07 PM    Glucose 100 12/13/2020 01:07 PM    BUN 16 12/13/2020 01:07 PM    Creatinine 0.78 12/13/2020 01:07 PM    BUN/Creatinine ratio 21 (H) 12/13/2020 01:07 PM    GFR est AA >60 12/13/2020 01:07 PM    GFR est non-AA >60 12/13/2020 01:07 PM    Calcium 10.4 (H) 12/13/2020 01:07 PM     Lab Results   Component Value Date/Time    Hemoglobin A1c 6.5 (H) 04/22/2020 11:14 AM    Hemoglobin A1c (POC) 6.3 09/15/2020 10:44 AM     Lab Results   Component Value Date/Time    Cholesterol, total 200 (H) 04/22/2020 11:14 AM    HDL Cholesterol 45 04/22/2020 11:14 AM    LDL, calculated 127 (H) 04/22/2020 11:14 AM    VLDL, calculated 28 04/22/2020 11:14 AM    Triglyceride 139 04/22/2020 11:14 AM     No results found for: VITD3, XQVID2, XQVID3, XQVID, VD3RIA    No results found for: TSH, TSH2, TSH3, TSHP, TSHEXT

## 2021-03-30 DIAGNOSIS — M79.661 PAIN IN BOTH LOWER LEGS: ICD-10-CM

## 2021-03-30 DIAGNOSIS — M79.662 PAIN IN BOTH LOWER LEGS: ICD-10-CM

## 2021-03-30 DIAGNOSIS — M48.061 SPINAL STENOSIS OF LUMBAR REGION, UNSPECIFIED WHETHER NEUROGENIC CLAUDICATION PRESENT: ICD-10-CM

## 2021-03-30 RX ORDER — CYCLOBENZAPRINE HCL 10 MG
TABLET ORAL
Qty: 90 TAB | Refills: 0 | Status: SHIPPED | OUTPATIENT
Start: 2021-03-30 | End: 2021-04-13 | Stop reason: SDUPTHER

## 2021-04-05 ENCOUNTER — PATIENT MESSAGE (OUTPATIENT)
Dept: FAMILY MEDICINE CLINIC | Age: 46
End: 2021-04-05

## 2021-04-09 DIAGNOSIS — M79.662 PAIN IN BOTH LOWER LEGS: ICD-10-CM

## 2021-04-09 DIAGNOSIS — M79.7 FIBROMYALGIA: ICD-10-CM

## 2021-04-09 DIAGNOSIS — M79.661 PAIN IN BOTH LOWER LEGS: ICD-10-CM

## 2021-04-09 RX ORDER — PREGABALIN 150 MG/1
CAPSULE ORAL
Qty: 90 CAP | Refills: 0 | Status: SHIPPED | OUTPATIENT
Start: 2021-04-09 | End: 2021-04-13 | Stop reason: SDUPTHER

## 2021-04-09 NOTE — TELEPHONE ENCOUNTER
----- Message from [employer] sent at 4/9/2021  2:12 PM EDT -----  Regarding: Dr. Jeannie Saucedo first and last name: Pt      Reason for call: med refill status      Callback required yes/no and why: Yes      Best contact number(s): [phone]      Details to clarify the request: Patient is calling to check on the status of her refill request for Lyrica. A request for this was put in on 04/07 and she would like to make sure this gets filled before the weekend. Please advise.       [employer]

## 2021-04-09 NOTE — TELEPHONE ENCOUNTER
Lyrica last refilled by Dr. Lolly Bolden on 2/8/21 for only 2 fills for unclear reasons. Refilled x 1 month. Dr. Lolly Bolden may send additional refills if appropriate.

## 2021-04-10 NOTE — TELEPHONE ENCOUNTER
From: Ike Reyes  To: Alverto Rockwell NP  Sent: 4/5/2021 4:52 PM EDT  Subject: Non-Urgent Medical Question    I have never had any skin issues and These spots on my skin hurt. . And have been there for almost 2 weeks i have tried anti fungal etc

## 2021-04-13 DIAGNOSIS — M79.662 PAIN IN BOTH LOWER LEGS: ICD-10-CM

## 2021-04-13 DIAGNOSIS — M79.661 PAIN IN BOTH LOWER LEGS: ICD-10-CM

## 2021-04-13 DIAGNOSIS — M48.061 SPINAL STENOSIS OF LUMBAR REGION, UNSPECIFIED WHETHER NEUROGENIC CLAUDICATION PRESENT: ICD-10-CM

## 2021-04-13 DIAGNOSIS — M79.7 FIBROMYALGIA: ICD-10-CM

## 2021-04-13 RX ORDER — CYCLOBENZAPRINE HCL 10 MG
10 TABLET ORAL
Qty: 90 TAB | Refills: 2 | Status: SHIPPED
Start: 2021-04-13 | End: 2021-05-11 | Stop reason: ALTCHOICE

## 2021-04-13 RX ORDER — PREGABALIN 200 MG/1
200 CAPSULE ORAL 3 TIMES DAILY
Qty: 90 CAP | Refills: 2 | Status: SHIPPED | OUTPATIENT
Start: 2021-04-13 | End: 2021-05-11

## 2021-04-14 NOTE — TELEPHONE ENCOUNTER
Last visit 11/11/2020 RYAN Capellan   Next appointment 04/14/2021 RYAN Capellan   Previous refill encounter(s)   10/21/2020 HCTZ #90 with 1 refill was discontinued on 01/05/2021 reason not a current medication     Requested Prescriptions     Pending Prescriptions Disp Refills    hydroCHLOROthiazide (HYDRODIURIL) 25 mg tablet 90 Tab 1     Sig: Take 1 Tab by mouth every evening.

## 2021-04-17 RX ORDER — HYDROCHLOROTHIAZIDE 25 MG/1
25 TABLET ORAL EVERY EVENING
Qty: 90 TAB | Refills: 0 | Status: SHIPPED | OUTPATIENT
Start: 2021-04-17 | End: 2022-01-26

## 2021-04-19 RX ORDER — GABAPENTIN 600 MG/1
1200 TABLET ORAL 3 TIMES DAILY
Qty: 180 TAB | Refills: 1 | OUTPATIENT
Start: 2021-04-19

## 2021-04-20 DIAGNOSIS — G93.2 IDIOPATHIC INTRACRANIAL HYPERTENSION: ICD-10-CM

## 2021-04-20 RX ORDER — LISINOPRIL 10 MG/1
5 TABLET ORAL DAILY
Qty: 45 TAB | Refills: 0 | OUTPATIENT
Start: 2021-04-20

## 2021-04-20 RX ORDER — PROMETHAZINE HYDROCHLORIDE 25 MG/1
TABLET ORAL
Qty: 60 TAB | Refills: 0 | OUTPATIENT
Start: 2021-04-20

## 2021-04-20 RX ORDER — PROMETHAZINE HYDROCHLORIDE 25 MG/1
TABLET ORAL
Qty: 120 TAB | Refills: 0 | OUTPATIENT
Start: 2021-04-20

## 2021-04-21 RX ORDER — ONDANSETRON 4 MG/1
TABLET, ORALLY DISINTEGRATING ORAL
Qty: 30 TAB | Refills: 0 | Status: SHIPPED | OUTPATIENT
Start: 2021-04-21 | End: 2021-07-10 | Stop reason: SDUPTHER

## 2021-05-11 DIAGNOSIS — M79.661 PAIN IN BOTH LOWER LEGS: ICD-10-CM

## 2021-05-11 DIAGNOSIS — M79.662 PAIN IN BOTH LOWER LEGS: ICD-10-CM

## 2021-05-11 DIAGNOSIS — M79.7 FIBROMYALGIA: ICD-10-CM

## 2021-05-11 RX ORDER — PREGABALIN 225 MG/1
225 CAPSULE ORAL 3 TIMES DAILY
Qty: 90 CAP | Refills: 2 | Status: SHIPPED | OUTPATIENT
Start: 2021-05-11 | End: 2021-05-14 | Stop reason: SDUPTHER

## 2021-05-11 RX ORDER — TIZANIDINE 4 MG/1
4 TABLET ORAL 3 TIMES DAILY
Qty: 90 TAB | Refills: 1 | Status: SHIPPED | OUTPATIENT
Start: 2021-05-11 | End: 2021-07-10 | Stop reason: SDUPTHER

## 2021-05-14 DIAGNOSIS — M79.662 PAIN IN BOTH LOWER LEGS: ICD-10-CM

## 2021-05-14 DIAGNOSIS — M79.7 FIBROMYALGIA: ICD-10-CM

## 2021-05-14 DIAGNOSIS — M79.661 PAIN IN BOTH LOWER LEGS: ICD-10-CM

## 2021-05-14 RX ORDER — PREGABALIN 225 MG/1
225 CAPSULE ORAL 3 TIMES DAILY
Qty: 90 CAP | Refills: 2 | Status: SHIPPED | OUTPATIENT
Start: 2021-05-14 | End: 2021-07-10 | Stop reason: SDUPTHER

## 2021-06-08 NOTE — ANCILLARY DISCHARGE INSTRUCTIONS
Physical Therapy at [facility],  
a part of  [facility] [address], [address] Karen Cruz Phone: [phone]  Fax: [phone] Discharge Summary  2-15 Patient name: Sheba Manzo  : 1975  Provider#: [id number] Referral source: Bridgette Crawford MD     
Medical/Treatment Diagnosis: Spinal stenosis, lumbar region without neurogenic claudication [M48.061] Prior Hospitalization: see medical history Comorbidities: See Plan of Care Prior Level of Function:See Plan of Care Medications: Verified on Patient Summary List 
 
Start of Care: 21      Onset Date: Visits from Start of Care: 2     Missed Visits: 2 no shows Reporting Period : 21 to 21 ASSESSMENT/SUMMARY OF CARE: The patient was evaluated for her low back pain and told to f/u on a weekly basis. She returned the following week and tolerated all interventions well, however she did not return afterwards and will now be discharged. Short Term Goals: To be accomplished in 4 weeks: 1. Patient will be able to stand for 15 minutes with <3/10 back pain. Not met. 2. Patient will be able to walk two blocks with <3/10 back pain. Not met. 3. Patient will be able to bend forward and tie her shoes without pain or limitation. Not met. Long Term Goals: To be accomplished in 12 weeks: 1. Patient will be able to stand for 30 minutes without back pain or limitation. Not met. 2. Patient will be able to walk 1/4 mile without back pain or limitation. Not met. 3. Patient will be able to bend forward and  10# from the floor without pain or limitation. Not met. RECOMMENDATIONS: 
[x]Discontinue therapy: []Patient has reached or is progressing toward set goals [x]Patient is non-compliant or has abdicated 
    []Due to lack of appreciable progress towards set goals []Other Waldo Billings, PT 2021

## 2021-06-14 DIAGNOSIS — G43.001 MIGRAINE WITHOUT AURA AND WITH STATUS MIGRAINOSUS, NOT INTRACTABLE: ICD-10-CM

## 2021-06-14 RX ORDER — BUTALBITAL, ACETAMINOPHEN AND CAFFEINE 50; 325; 40 MG/1; MG/1; MG/1
TABLET ORAL
Qty: 30 TABLET | Refills: 1 | Status: SHIPPED | OUTPATIENT
Start: 2021-06-14 | End: 2021-08-15

## 2021-07-10 DIAGNOSIS — G93.2 IDIOPATHIC INTRACRANIAL HYPERTENSION: ICD-10-CM

## 2021-07-10 DIAGNOSIS — G43.001 MIGRAINE WITHOUT AURA AND WITH STATUS MIGRAINOSUS, NOT INTRACTABLE: ICD-10-CM

## 2021-07-10 DIAGNOSIS — M79.662 PAIN IN BOTH LOWER LEGS: ICD-10-CM

## 2021-07-10 DIAGNOSIS — M79.661 PAIN IN BOTH LOWER LEGS: ICD-10-CM

## 2021-07-10 DIAGNOSIS — M79.7 FIBROMYALGIA: ICD-10-CM

## 2021-07-12 ENCOUNTER — TELEPHONE (OUTPATIENT)
Dept: NEUROLOGY | Age: 46
End: 2021-07-12

## 2021-07-12 DIAGNOSIS — M79.7 FIBROMYALGIA: ICD-10-CM

## 2021-07-12 DIAGNOSIS — M79.662 PAIN IN BOTH LOWER LEGS: ICD-10-CM

## 2021-07-12 DIAGNOSIS — M79.661 PAIN IN BOTH LOWER LEGS: ICD-10-CM

## 2021-07-12 RX ORDER — BUTALBITAL, ACETAMINOPHEN AND CAFFEINE 50; 325; 40 MG/1; MG/1; MG/1
TABLET ORAL
Qty: 30 TABLET | Refills: 1 | OUTPATIENT
Start: 2021-07-12

## 2021-07-12 RX ORDER — PREGABALIN 225 MG/1
225 CAPSULE ORAL 3 TIMES DAILY
Qty: 90 CAPSULE | Refills: 2 | Status: SHIPPED | OUTPATIENT
Start: 2021-07-12 | End: 2021-07-12

## 2021-07-12 RX ORDER — PREGABALIN 225 MG/1
225 CAPSULE ORAL 2 TIMES DAILY
Qty: 60 CAPSULE | Refills: 1 | Status: SHIPPED | OUTPATIENT
Start: 2021-07-12 | End: 2021-10-11

## 2021-07-12 RX ORDER — ONDANSETRON 4 MG/1
4 TABLET, ORALLY DISINTEGRATING ORAL
Qty: 30 TABLET | Refills: 0 | Status: SHIPPED | OUTPATIENT
Start: 2021-07-12 | End: 2021-09-24

## 2021-07-12 RX ORDER — TIZANIDINE 4 MG/1
4 TABLET ORAL 3 TIMES DAILY
Qty: 90 TABLET | Refills: 1 | Status: SHIPPED | OUTPATIENT
Start: 2021-07-12 | End: 2021-09-24 | Stop reason: SDUPTHER

## 2021-07-12 NOTE — TELEPHONE ENCOUNTER
Pharmacist states patient has been on Lyrica BID and the refill he received was for TID. He stated this is over the daily allowance of 600mg. Please advise.

## 2021-08-14 DIAGNOSIS — G43.001 MIGRAINE WITHOUT AURA AND WITH STATUS MIGRAINOSUS, NOT INTRACTABLE: ICD-10-CM

## 2021-08-15 RX ORDER — BUTALBITAL, ACETAMINOPHEN AND CAFFEINE 50; 325; 40 MG/1; MG/1; MG/1
TABLET ORAL
Qty: 30 TABLET | Refills: 0 | Status: SHIPPED | OUTPATIENT
Start: 2021-08-15 | End: 2021-09-13

## 2021-09-12 DIAGNOSIS — G43.001 MIGRAINE WITHOUT AURA AND WITH STATUS MIGRAINOSUS, NOT INTRACTABLE: ICD-10-CM

## 2021-09-13 RX ORDER — BUTALBITAL, ACETAMINOPHEN AND CAFFEINE 50; 325; 40 MG/1; MG/1; MG/1
TABLET ORAL
Qty: 30 TABLET | Refills: 0 | Status: SHIPPED | OUTPATIENT
Start: 2021-09-13 | End: 2021-09-24

## 2021-09-24 DIAGNOSIS — G93.2 IDIOPATHIC INTRACRANIAL HYPERTENSION: ICD-10-CM

## 2021-09-24 DIAGNOSIS — G43.001 MIGRAINE WITHOUT AURA AND WITH STATUS MIGRAINOSUS, NOT INTRACTABLE: ICD-10-CM

## 2021-09-24 DIAGNOSIS — M79.662 PAIN IN BOTH LOWER LEGS: ICD-10-CM

## 2021-09-24 DIAGNOSIS — M79.7 FIBROMYALGIA: ICD-10-CM

## 2021-09-24 DIAGNOSIS — M79.661 PAIN IN BOTH LOWER LEGS: ICD-10-CM

## 2021-09-24 RX ORDER — ONDANSETRON 4 MG/1
TABLET, ORALLY DISINTEGRATING ORAL
Qty: 30 TABLET | Refills: 0 | Status: SHIPPED | OUTPATIENT
Start: 2021-09-24 | End: 2021-10-05

## 2021-09-24 RX ORDER — BUTALBITAL, ACETAMINOPHEN AND CAFFEINE 50; 325; 40 MG/1; MG/1; MG/1
TABLET ORAL
Qty: 30 TABLET | Refills: 0 | Status: SHIPPED | OUTPATIENT
Start: 2021-09-24 | End: 2022-01-26

## 2021-09-27 RX ORDER — TIZANIDINE 4 MG/1
4 TABLET ORAL 3 TIMES DAILY
Qty: 90 TABLET | Refills: 1 | Status: SHIPPED | OUTPATIENT
Start: 2021-09-27 | End: 2021-12-31

## 2021-09-27 RX ORDER — FUROSEMIDE 20 MG/1
20 TABLET ORAL DAILY
Qty: 30 TABLET | Refills: 0 | Status: SHIPPED | OUTPATIENT
Start: 2021-09-27 | End: 2022-01-26

## 2021-10-05 DIAGNOSIS — G93.2 IDIOPATHIC INTRACRANIAL HYPERTENSION: ICD-10-CM

## 2021-10-05 RX ORDER — ONDANSETRON 4 MG/1
TABLET, ORALLY DISINTEGRATING ORAL
Qty: 30 TABLET | Refills: 0 | Status: SHIPPED | OUTPATIENT
Start: 2021-10-05 | End: 2021-11-11

## 2021-10-09 DIAGNOSIS — M79.662 PAIN IN BOTH LOWER LEGS: ICD-10-CM

## 2021-10-09 DIAGNOSIS — M79.7 FIBROMYALGIA: ICD-10-CM

## 2021-10-09 DIAGNOSIS — M79.661 PAIN IN BOTH LOWER LEGS: ICD-10-CM

## 2021-10-11 RX ORDER — PREGABALIN 225 MG/1
CAPSULE ORAL
Qty: 90 CAPSULE | Refills: 0 | Status: SHIPPED | OUTPATIENT
Start: 2021-10-11 | End: 2021-11-11

## 2021-10-18 DIAGNOSIS — G43.001 MIGRAINE WITHOUT AURA AND WITH STATUS MIGRAINOSUS, NOT INTRACTABLE: Primary | ICD-10-CM

## 2021-10-18 RX ORDER — RIMEGEPANT SULFATE 75 MG/75MG
75 TABLET, ORALLY DISINTEGRATING ORAL EVERY OTHER DAY
Qty: 16 TABLET | Refills: 1 | Status: SHIPPED | OUTPATIENT
Start: 2021-10-18 | End: 2021-12-07 | Stop reason: ALTCHOICE

## 2021-10-29 ENCOUNTER — TELEPHONE (OUTPATIENT)
Dept: NEUROLOGY | Age: 46
End: 2021-10-29

## 2021-10-29 NOTE — TELEPHONE ENCOUNTER
Re: Theo FOFANA request through [employer], submitted and awaiting update. Per approval questionnaire Nurtec is used for acute treatment, not preventative. Submitted and awaiting update.

## 2021-11-03 NOTE — TELEPHONE ENCOUNTER
Re: Fidencio Brown denial letter for Nurtec    Scanned to chart. Per letter pt must try preferred drug 1st, Ubrelvy. Sent update to provider and faxed letter to [employer].

## 2021-11-10 ENCOUNTER — TELEPHONE (OUTPATIENT)
Dept: NEUROLOGY | Age: 46
End: 2021-11-10

## 2021-11-10 NOTE — TELEPHONE ENCOUNTER
Re: Amada Lozano [place] request through [phone] [facility])    Submitted and awaiting update.

## 2021-11-10 NOTE — TELEPHONE ENCOUNTER
Pts Speedy Hipolito is now approved for 3 month, expires on 02/10/22, pt will need a follow up prior to 02/10/22 to see how medication is working. Thanks.

## 2021-11-10 NOTE — TELEPHONE ENCOUNTER
Re: Jose L FOFANA approval effective 11/10/21-02/10/22    Called pharmacy and they were able to process it

## 2021-11-28 DIAGNOSIS — G93.2 IDIOPATHIC INTRACRANIAL HYPERTENSION: ICD-10-CM

## 2021-11-29 RX ORDER — ONDANSETRON 4 MG/1
TABLET, ORALLY DISINTEGRATING ORAL
Qty: 30 TABLET | Refills: 0 | Status: SHIPPED | OUTPATIENT
Start: 2021-11-29 | End: 2022-01-04

## 2021-12-27 DIAGNOSIS — F41.1 GAD (GENERALIZED ANXIETY DISORDER): ICD-10-CM

## 2021-12-28 RX ORDER — BUSPIRONE HYDROCHLORIDE 7.5 MG/1
7.5 TABLET ORAL 3 TIMES DAILY
Qty: 90 TABLET | Refills: 0 | Status: SHIPPED | OUTPATIENT
Start: 2021-12-28 | End: 2022-02-10 | Stop reason: SDUPTHER

## 2021-12-30 DIAGNOSIS — G43.709 CHRONIC MIGRAINE W/O AURA W/O STATUS MIGRAINOSUS, NOT INTRACTABLE: ICD-10-CM

## 2021-12-30 DIAGNOSIS — G43.001 MIGRAINE WITHOUT AURA AND WITH STATUS MIGRAINOSUS, NOT INTRACTABLE: Primary | ICD-10-CM

## 2021-12-30 RX ORDER — RIMEGEPANT SULFATE 75 MG/75MG
75 TABLET, ORALLY DISINTEGRATING ORAL EVERY OTHER DAY
Qty: 16 TABLET | Refills: 3 | Status: SHIPPED | OUTPATIENT
Start: 2021-12-30 | End: 2022-01-04 | Stop reason: ALTCHOICE

## 2022-01-06 DIAGNOSIS — G43.709 CHRONIC MIGRAINE W/O AURA W/O STATUS MIGRAINOSUS, NOT INTRACTABLE: Primary | ICD-10-CM

## 2022-01-06 RX ORDER — RIMEGEPANT SULFATE 75 MG/75MG
75 TABLET, ORALLY DISINTEGRATING ORAL EVERY OTHER DAY
Qty: 16 TABLET | Refills: 1 | Status: SHIPPED | OUTPATIENT
Start: 2022-01-06 | End: 2022-01-12 | Stop reason: ALTCHOICE

## 2022-01-11 ENCOUNTER — TELEPHONE (OUTPATIENT)
Dept: NEUROLOGY | Age: 47
End: 2022-01-11

## 2022-01-11 NOTE — TELEPHONE ENCOUNTER
[facility], answered and awaiting update. Will most likely deny as insurance uses [facility] for acute treatment.

## 2022-01-11 NOTE — TELEPHONE ENCOUNTER
Re: Tarun FOFANA request from [place], Created case in [facility]# [id number]    Awaiting clinicals.

## 2022-01-12 DIAGNOSIS — F51.05 INSOMNIA DUE TO OTHER MENTAL DISORDER: ICD-10-CM

## 2022-01-12 DIAGNOSIS — F41.1 GAD (GENERALIZED ANXIETY DISORDER): ICD-10-CM

## 2022-01-12 DIAGNOSIS — M79.7 FIBROMYALGIA: ICD-10-CM

## 2022-01-12 DIAGNOSIS — F99 INSOMNIA DUE TO OTHER MENTAL DISORDER: ICD-10-CM

## 2022-01-12 NOTE — TELEPHONE ENCOUNTER
Chief Complaint   Patient presents with    Medication Refill     amitriptyline 50 mg tablet      Patient sent [employer] message to contact office to schedule an appointment for follow up care and medication refills.   Elle Barrientos LPN

## 2022-01-12 NOTE — TELEPHONE ENCOUNTER
Please contact patient for scheduling. Thanks    Please route to covering provider. Last visit 01/05/2021 MD Castellano   Next appointment Nothing scheduled   Previous refill encounter(s)   02/12/2021:  - Desyrel #270 with 1 refill,   - Cymbalta 30 & 60 mg #90 with 1 refill     Requested Prescriptions     Pending Prescriptions Disp Refills    DULoxetine (Cymbalta) 60 mg capsule 30 Capsule 0     Sig: Take 1 Capsule by mouth daily. Total daily dose 90 mg    traZODone (DESYREL) 50 mg tablet 90 Tablet 0     Sig: Take 3 Tablets by mouth nightly. Indications: insomnia associated with depression    DULoxetine (CYMBALTA) 30 mg capsule 30 Capsule 0     Sig: Take 1 Capsule by mouth daily. Total daily dose 90 mg.   Indications: disorder characterized by stiff, tender & painful muscles

## 2022-01-12 NOTE — TELEPHONE ENCOUNTER
Please contact patient for scheduling. Thanks     Please route to covering provider.      Last visit 01/05/2021 MD Ban Beatty   Next appointment Nothing scheduled  Previous refill encounter(s)   01/25/2021 Elavil #60 with 3 refills    Requested Prescriptions     Pending Prescriptions Disp Refills    amitriptyline (ELAVIL) 50 mg tablet 60 Tablet 0     Sig: Take 2 Tablets by mouth nightly.  To prevent migraine

## 2022-01-13 DIAGNOSIS — M79.661 PAIN IN BOTH LOWER LEGS: ICD-10-CM

## 2022-01-13 DIAGNOSIS — M79.662 PAIN IN BOTH LOWER LEGS: ICD-10-CM

## 2022-01-13 DIAGNOSIS — M79.7 FIBROMYALGIA: ICD-10-CM

## 2022-01-13 RX ORDER — TIZANIDINE 4 MG/1
TABLET ORAL
Qty: 90 TABLET | Refills: 0 | Status: SHIPPED | OUTPATIENT
Start: 2022-01-13 | End: 2022-04-20 | Stop reason: SDUPTHER

## 2022-01-14 DIAGNOSIS — G43.001 MIGRAINE WITHOUT AURA AND WITH STATUS MIGRAINOSUS, NOT INTRACTABLE: Primary | ICD-10-CM

## 2022-01-14 RX ORDER — METHYLPREDNISOLONE 4 MG/1
TABLET ORAL
Qty: 1 DOSE PACK | Refills: 0 | Status: SHIPPED | OUTPATIENT
Start: 2022-01-14 | End: 2022-01-26

## 2022-01-17 RX ORDER — DULOXETIN HYDROCHLORIDE 60 MG/1
60 CAPSULE, DELAYED RELEASE ORAL DAILY
Qty: 30 CAPSULE | Refills: 0 | Status: SHIPPED | OUTPATIENT
Start: 2022-01-17 | End: 2022-02-10 | Stop reason: SDUPTHER

## 2022-01-17 RX ORDER — DULOXETIN HYDROCHLORIDE 30 MG/1
30 CAPSULE, DELAYED RELEASE ORAL DAILY
Qty: 30 CAPSULE | Refills: 0 | Status: SHIPPED | OUTPATIENT
Start: 2022-01-17 | End: 2022-02-10 | Stop reason: SDUPTHER

## 2022-01-17 RX ORDER — AMITRIPTYLINE HYDROCHLORIDE 50 MG/1
100 TABLET, FILM COATED ORAL
Qty: 60 TABLET | Refills: 0 | Status: SHIPPED | OUTPATIENT
Start: 2022-01-17 | End: 2022-04-15 | Stop reason: SDUPTHER

## 2022-01-17 RX ORDER — TRAZODONE HYDROCHLORIDE 50 MG/1
150 TABLET ORAL
Qty: 90 TABLET | Refills: 0 | Status: SHIPPED | OUTPATIENT
Start: 2022-01-17 | End: 2022-03-04 | Stop reason: SDUPTHER

## 2022-01-19 NOTE — TELEPHONE ENCOUNTER
Re: Andra FOFANA denial letter, scanned to chart. Per letter pt must try and fail Emgality and Ajovy for prevention. See provider discontinued medication due to alternate therapy. Scanned letter to chart and faxed update to [facility] but pt cannot use them due to [facility] insurance.

## 2022-01-26 ENCOUNTER — OFFICE VISIT (OUTPATIENT)
Dept: NEUROLOGY | Age: 47
End: 2022-01-26
Payer: MEDICAID

## 2022-01-26 VITALS
HEART RATE: 89 BPM | TEMPERATURE: 97.5 F | HEIGHT: 65 IN | WEIGHT: 183 LBS | DIASTOLIC BLOOD PRESSURE: 77 MMHG | BODY MASS INDEX: 30.49 KG/M2 | SYSTOLIC BLOOD PRESSURE: 137 MMHG | OXYGEN SATURATION: 98 %

## 2022-01-26 DIAGNOSIS — M79.7 FIBROMYALGIA: ICD-10-CM

## 2022-01-26 DIAGNOSIS — G93.2 IDIOPATHIC INTRACRANIAL HYPERTENSION: ICD-10-CM

## 2022-01-26 DIAGNOSIS — G43.709 CHRONIC MIGRAINE W/O AURA W/O STATUS MIGRAINOSUS, NOT INTRACTABLE: ICD-10-CM

## 2022-01-26 DIAGNOSIS — G25.0 BENIGN ESSENTIAL TREMOR: ICD-10-CM

## 2022-01-26 DIAGNOSIS — Z98.2 S/P VP SHUNT: ICD-10-CM

## 2022-01-26 DIAGNOSIS — G90.A POTS (POSTURAL ORTHOSTATIC TACHYCARDIA SYNDROME): Primary | ICD-10-CM

## 2022-01-26 PROCEDURE — 99215 OFFICE O/P EST HI 40 MIN: CPT | Performed by: PSYCHIATRY & NEUROLOGY

## 2022-01-26 RX ORDER — RIZATRIPTAN BENZOATE 10 MG/1
10 TABLET, ORALLY DISINTEGRATING ORAL
Qty: 9 TABLET | Refills: 3 | Status: SHIPPED | OUTPATIENT
Start: 2022-01-26 | End: 2022-01-26

## 2022-01-26 RX ORDER — GALCANEZUMAB 120 MG/ML
120 INJECTION, SOLUTION SUBCUTANEOUS
Qty: 1 ML | Refills: 3 | Status: SHIPPED | OUTPATIENT
Start: 2022-01-26 | End: 2022-03-03 | Stop reason: SDUPTHER

## 2022-01-26 NOTE — PROGRESS NOTES
Chief Complaint   Patient presents with    Follow-up    Migraine     daily, more severe and last longer    Headache     has to take 2 Ubrelvy daily     HISTORY OF PRESENT ILLNESS  Shayna Castro is a 41 y.o. female came back for follow-up. She was doing relatively better until about 3 weeks ago when she started to experience almost daily headache. She describes this as a pressure-like sensation in both frontal temporal regions associated sometimes with light sensitivity and nausea. She has been using Zhane Levee which takes the edge off. Also uses Tylenol as needed. She has had gastric sleeve surgery and has lost 60 to 70 pounds of weight    Her last ophthalmology examination revealed that she has occult edema and developed retinal neural fiber layer loss. Peripheral visual field deficits were noted on telemetry. She did undergo lumbar puncture which revealed a normal opening pressure 8.6  X-ray shunt series revealed a patent lumboperitoneal shunt. She is also suspected to have fibromyalgia and has been having diffuse aches and pains from time to time. Currently on Lyrica and Cymbalta. She has had intermittent dizziness/lightheadedness and orthostatic vitals raising concern for postural orthostasis tachycardia syndrome. ANS testing was recommended at last visit but she has not been able to complete it as yet. Neurogenic claudication/pain in her legs with standard walking has now resolved with physical therapy. EMG was unremarkable    Has chronic migraines. Doing relatively well from a migraine standpoint. Takes muscle relaxers/cyclobenzaprine as needed      RECAP  She has history of idiopathic intracranial hypertension and had a  shunt implantation in 2010 at [facility]. She was having a lot of headaches and shunt helped significantly. Also has a history of cluster headaches and migraines but those have not occurred in years.       Current Outpatient Medications   Medication Sig    galcanezumab-gnlm (Emgality Pen) 120 mg/mL injection 1 mL by SubCUTAneous route every thirty (30) days.  rizatriptan (MAXALT-MLT) 10 mg disintegrating tablet Take 1 Tablet by mouth once as needed for Migraine for up to 1 dose. May repeat x1 in 2 hrs if needed    amitriptyline (ELAVIL) 50 mg tablet Take 2 Tablets by mouth nightly. To prevent migraine    DULoxetine (Cymbalta) 60 mg capsule Take 1 Capsule by mouth daily. Total daily dose 90 mg    traZODone (DESYREL) 50 mg tablet Take 3 Tablets by mouth nightly. Indications: insomnia associated with depression    DULoxetine (CYMBALTA) 30 mg capsule Take 1 Capsule by mouth daily. Total daily dose 90 mg. Indications: disorder characterized by stiff, tender & painful muscles    tiZANidine (ZANAFLEX) 4 mg tablet TAKE 1 TABLET BY MOUTH THREE TIMES DAILY    Ubrelvy 100 mg tablet TAKE 1 TABLET BY MOUTH ONCE DAILY AS NEEDED FOR  MIGRAINE.  MAY  REPEAT  ONE  DOSE  IN  4  HOURS  IF  NEEDED.  ondansetron (ZOFRAN ODT) 4 mg disintegrating tablet DISSOLVE 1 TABLET IN MOUTH EVERY 8 HOURS AS NEEDED FOR NAUSEA AND VOMITING    busPIRone (BUSPAR) 7.5 mg tablet Take 1 Tablet by mouth three (3) times daily.  pregabalin (LYRICA) 225 mg capsule TAKE 1 CAPSULE BY MOUTH THREE TIMES DAILY . DO NOT EXCEED 3 PER 24 HOURS    albuterol (PROVENTIL HFA, VENTOLIN HFA, PROAIR HFA) 90 mcg/actuation inhaler Take 2 Puffs by inhalation every six (6) hours as needed for Wheezing or Cough.  promethazine (PHENERGAN) 25 mg tablet 1-2 tabs PO every 6 hours prn migraine    liraglutide (VICTOZA) 0.6 mg/0.1 mL (18 mg/3 mL) pnij 1.8 mg subcutaneous once daily for 2 weeks, then increase to 3 mg once daily    Insulin Needles, Disposable, 31 gauge x 5/16\" ndle Use one daily.  cholecalciferol (VITAMIN D3) (400 Units /10 mcg) tab tablet Take 2 Tabs by mouth daily.  Indications: prevention of vitamin D deficiency    Creon 36,000-114,000- 180,000 unit cpDR capsule     diphenhydrAMINE (Benadryl Allergy) 25 mg tablet Take 75 mg by mouth nightly.  Blood-Glucose Meter (OneTouch Verio Meter) misc CHECK BLOOD GLUCOSE TWICE A DAY    glucose blood VI test strips (OneTouch Verio test strips) strip CHECK BLOOD GLUCOSE TWICE A DAY    lisinopriL (PRINIVIL, ZESTRIL) 10 mg tablet Take 0.5 Tabs by mouth daily. (Patient not taking: Reported on 1/26/2022)     No current facility-administered medications for this visit. PHYSICAL EXAMINATION:    Visit Vitals  /77   Pulse 89   Temp 97.5 °F (36.4 °C) (Temporal)   Ht 5' 5\" (1.651 m)   Wt 183 lb (83 kg)   SpO2 98%   BMI 30.45 kg/m²       Exam:  NEUROLOGICAL EXAM:  General: Awake, alert, oriented x 3  CN: EOMI, facial strength normal and symmetric, hearing is intact. Fundus exam reveals mildly obscured disc margins. Motor: AG x 4  Reflexes: deferred  Coordination: No ataxia. Sensation: LT intact throughout  Gait: Steady    Orthostatic vitals lying down: 120/80, heart rate 82  Standing up: 100/78, heart rate 117    LABS:    CSF was unremarkable except for elevated total protein at 70 and elevated myelin basic protein, WBC 3, RBC 0, glucose 58, oligoclonal bands 0    Lab Results   Component Value Date/Time    WBC 8.2 12/13/2020 01:07 PM    HGB 14.3 12/13/2020 01:07 PM    HCT 42.7 12/13/2020 01:07 PM    PLATELET 734 13/51/3856 01:07 PM    MCV 89.1 12/13/2020 01:07 PM     Lab Results   Component Value Date/Time    Sodium 136 12/13/2020 01:07 PM    Potassium 4.4 12/13/2020 01:07 PM    Chloride 102 12/13/2020 01:07 PM    CO2 28 12/13/2020 01:07 PM    Anion gap 6 12/13/2020 01:07 PM    Glucose 100 12/13/2020 01:07 PM    BUN 16 12/13/2020 01:07 PM    Creatinine 0.78 12/13/2020 01:07 PM    BUN/Creatinine ratio 21 (H) 12/13/2020 01:07 PM    GFR est AA >60 12/13/2020 01:07 PM    GFR est non-AA >60 12/13/2020 01:07 PM    Calcium 10.4 (H) 12/13/2020 01:07 PM    Bilirubin, total 0.3 12/13/2020 01:07 PM    Alk.  phosphatase 90 12/13/2020 01:07 PM    Protein, total 8.5 (H) 12/13/2020 01:07 PM    Albumin 4.4 12/13/2020 01:07 PM    Globulin 4.1 (H) 12/13/2020 01:07 PM    A-G Ratio 1.1 12/13/2020 01:07 PM    ALT (SGPT) 37 12/13/2020 01:07 PM    AST (SGOT) 15 12/13/2020 01:07 PM       ASSESSMENT    ICD-10-CM ICD-9-CM    1. POTS (postural orthostatic tachycardia syndrome)  I49.8 427.89 REFERRAL TO NEUROLOGY   2. Fibromyalgia  M79.7 729.1    3. Idiopathic intracranial hypertension  G93.2 348.2    4. Benign essential tremor  G25.0 333.1    5. S/P  shunt  Z98.2 V45.2    6. Chronic migraine w/o aura w/o status migrainosus, not intractable  G43.709 346.70 galcanezumab-gnlm (Emgality Pen) 120 mg/mL injection      rizatriptan (MAXALT-MLT) 10 mg disintegrating tablet       DISCUSSION  Ms. Earnest Dial has a remote history of idiopathic intracranial hypertension and is status post ventriculoperitoneal shunt. She was noticing decline in her vision and had eye exam raise the concern for occult disc edema and possible increased intracranial pressure. However, her lumbar puncture revealed a low normal opening pressure which would be expected in a functioning shunt  Continue follow-up with ophthalmology    Re: Postural dizziness I have again recommended ANS testing. Her orthostatic vital signs revealed a 20 point drop in her systolic blood pressure along with tachycardia at 117 bpm.    CSF revealed mild elevation of total protein which is likely related to the history of procedure/shunt and by itself is of unclear clinical significance  She was advised to make a follow-up appointment with ophthalmology    She seems to be having migrainous headaches quite frequently and I have recommended trying Emgality monthly subcutaneous injection for prophylaxis  Try rizatriptan as needed for abortive therapy. Nurtec was denied. A steroid course was prescribed recently which was not very helpful.     She also has chronic lumbar spinal stenosis with neurogenic claudication and this has improved with PT   We may consider repeat imaging of the lumbar spine depending upon clinical course    Continue Cymbalta and Lyrica gabapentin for fibromyalgia      Will MD Chary  Diplomate, American Board of Psychiatry & Neurology (Neurology)  [place] Board of Psychiatry & Neurology (Clinical Neurophysiology)  Diplomate, American Board of Electrodiagnostic Medicine

## 2022-01-28 ENCOUNTER — PATIENT MESSAGE (OUTPATIENT)
Dept: FAMILY MEDICINE CLINIC | Age: 47
End: 2022-01-28

## 2022-01-28 DIAGNOSIS — E11.9 DIABETES MELLITUS TYPE 2, NONINSULIN DEPENDENT (HCC): ICD-10-CM

## 2022-01-28 DIAGNOSIS — E66.01 SEVERE OBESITY (HCC): ICD-10-CM

## 2022-01-30 NOTE — TELEPHONE ENCOUNTER
From: Renetta Fragoso  To: Garret Potts NP  Sent: 1/28/2022 12:10 PM EST  Subject: Refill    I need a refill on my Victoza I was getting thru Dr Augusta Gilford but I can not afford her office was 3ml dose per day  Will have appt asap but currently out of [facility]

## 2022-01-31 ENCOUNTER — TELEPHONE (OUTPATIENT)
Dept: FAMILY MEDICINE CLINIC | Age: 47
End: 2022-01-31

## 2022-01-31 NOTE — TELEPHONE ENCOUNTER
[facility] [address] [facility] [place] (Pharmacy) [phone]     Pharmacy needs to verify if the Victoza medication is being used for weightloss or diabetes. Please call back and advise.

## 2022-02-01 DIAGNOSIS — E11.9 DIABETES MELLITUS TYPE 2, NONINSULIN DEPENDENT (HCC): ICD-10-CM

## 2022-02-01 DIAGNOSIS — E66.01 SEVERE OBESITY (HCC): ICD-10-CM

## 2022-02-01 RX ORDER — PEN NEEDLE, DIABETIC 30 GX3/16"
NEEDLE, DISPOSABLE MISCELLANEOUS
Qty: 1 EACH | Refills: 11 | Status: SHIPPED | OUTPATIENT
Start: 2022-02-01 | End: 2022-10-13 | Stop reason: SDUPTHER

## 2022-02-01 NOTE — TELEPHONE ENCOUNTER
Called pharmacy. Confirmed that rx is being used for both weight loss and DM, and confirmed dosage of 1.8 mg subcutaneous once daily for 2 weeks, then increase to 3 mg once daily. Technician said that the prescription has now been transferred to [facility] but that Victoza does not go up to 3 mg, the only medication that she knows that does is [place].

## 2022-02-01 NOTE — TELEPHONE ENCOUNTER
Chief Complaint   Patient presents with    Medication Refill     Victoza and pen needles. Patient scheduled for 2/10/2022.   Remy Chris LPN

## 2022-02-08 DIAGNOSIS — G93.2 IDIOPATHIC INTRACRANIAL HYPERTENSION: ICD-10-CM

## 2022-02-08 RX ORDER — RIZATRIPTAN BENZOATE 10 MG/1
10 TABLET ORAL
Qty: 10 TABLET | Refills: 3 | Status: SHIPPED | OUTPATIENT
Start: 2022-02-08 | End: 2022-02-21 | Stop reason: ALTCHOICE

## 2022-02-08 RX ORDER — ONDANSETRON 4 MG/1
TABLET, ORALLY DISINTEGRATING ORAL
Qty: 30 TABLET | Refills: 1 | Status: SHIPPED | OUTPATIENT
Start: 2022-02-08 | End: 2022-02-28

## 2022-02-10 ENCOUNTER — OFFICE VISIT (OUTPATIENT)
Dept: FAMILY MEDICINE CLINIC | Age: 47
End: 2022-02-10
Payer: MEDICAID

## 2022-02-10 VITALS
HEART RATE: 83 BPM | DIASTOLIC BLOOD PRESSURE: 70 MMHG | BODY MASS INDEX: 31.32 KG/M2 | RESPIRATION RATE: 16 BRPM | OXYGEN SATURATION: 96 % | SYSTOLIC BLOOD PRESSURE: 107 MMHG | HEIGHT: 65 IN | WEIGHT: 188 LBS | TEMPERATURE: 98.8 F

## 2022-02-10 DIAGNOSIS — Z11.59 NEED FOR HEPATITIS C SCREENING TEST: ICD-10-CM

## 2022-02-10 DIAGNOSIS — I10 PRIMARY HYPERTENSION: ICD-10-CM

## 2022-02-10 DIAGNOSIS — M79.7 FIBROMYALGIA: ICD-10-CM

## 2022-02-10 DIAGNOSIS — Z90.3 H/O GASTRIC SLEEVE: ICD-10-CM

## 2022-02-10 DIAGNOSIS — Z98.2 S/P VP SHUNT: ICD-10-CM

## 2022-02-10 DIAGNOSIS — L81.9 PIGMENTED SKIN LESIONS: ICD-10-CM

## 2022-02-10 DIAGNOSIS — F41.1 GAD (GENERALIZED ANXIETY DISORDER): ICD-10-CM

## 2022-02-10 DIAGNOSIS — E11.9 DIABETES MELLITUS TYPE 2, NONINSULIN DEPENDENT (HCC): Primary | ICD-10-CM

## 2022-02-10 DIAGNOSIS — Z88.9 ALLERGIC REACTION, HISTORY OF: ICD-10-CM

## 2022-02-10 DIAGNOSIS — E66.01 SEVERE OBESITY (HCC): ICD-10-CM

## 2022-02-10 LAB
25(OH)D3 SERPL-MCNC: 40.3 NG/ML (ref 30–100)
ALBUMIN SERPL-MCNC: 4.2 G/DL (ref 3.5–5)
ALBUMIN/GLOB SERPL: 1.5 {RATIO} (ref 1.1–2.2)
ALP SERPL-CCNC: 92 U/L (ref 45–117)
ALT SERPL-CCNC: 36 U/L (ref 12–78)
ANION GAP SERPL CALC-SCNC: 2 MMOL/L (ref 5–15)
AST SERPL-CCNC: 13 U/L (ref 15–37)
BASOPHILS # BLD: 0.1 K/UL (ref 0–0.1)
BASOPHILS NFR BLD: 1 % (ref 0–1)
BILIRUB SERPL-MCNC: 0.4 MG/DL (ref 0.2–1)
BUN SERPL-MCNC: 11 MG/DL (ref 6–20)
BUN/CREAT SERPL: 17 (ref 12–20)
CALCIUM SERPL-MCNC: 9.4 MG/DL (ref 8.5–10.1)
CHLORIDE SERPL-SCNC: 109 MMOL/L (ref 97–108)
CHOLEST SERPL-MCNC: 171 MG/DL
CO2 SERPL-SCNC: 30 MMOL/L (ref 21–32)
CREAT SERPL-MCNC: 0.63 MG/DL (ref 0.55–1.02)
CREAT UR-MCNC: 30.6 MG/DL
DIFFERENTIAL METHOD BLD: NORMAL
EOSINOPHIL # BLD: 0.2 K/UL (ref 0–0.4)
EOSINOPHIL NFR BLD: 3 % (ref 0–7)
ERYTHROCYTE [DISTWIDTH] IN BLOOD BY AUTOMATED COUNT: 13.2 % (ref 11.5–14.5)
FOLATE SERPL-MCNC: 38.6 NG/ML (ref 5–21)
GLOBULIN SER CALC-MCNC: 2.8 G/DL (ref 2–4)
GLUCOSE SERPL-MCNC: 81 MG/DL (ref 65–100)
HBA1C MFR BLD HPLC: 5.3 %
HCT VFR BLD AUTO: 40.6 % (ref 35–47)
HCV AB SERPL QL IA: NONREACTIVE
HDLC SERPL-MCNC: 60 MG/DL
HDLC SERPL: 2.9 {RATIO} (ref 0–5)
HGB BLD-MCNC: 13.2 G/DL (ref 11.5–16)
IMM GRANULOCYTES # BLD AUTO: 0 K/UL (ref 0–0.04)
IMM GRANULOCYTES NFR BLD AUTO: 0 % (ref 0–0.5)
LDLC SERPL CALC-MCNC: 93.4 MG/DL (ref 0–100)
LYMPHOCYTES # BLD: 3.3 K/UL (ref 0.8–3.5)
LYMPHOCYTES NFR BLD: 44 % (ref 12–49)
MCH RBC QN AUTO: 30.3 PG (ref 26–34)
MCHC RBC AUTO-ENTMCNC: 32.5 G/DL (ref 30–36.5)
MCV RBC AUTO: 93.3 FL (ref 80–99)
MICROALBUMIN UR-MCNC: 2.09 MG/DL
MICROALBUMIN/CREAT UR-RTO: 68 MG/G (ref 0–30)
MONOCYTES # BLD: 0.6 K/UL (ref 0–1)
MONOCYTES NFR BLD: 8 % (ref 5–13)
NEUTS SEG # BLD: 3.3 K/UL (ref 1.8–8)
NEUTS SEG NFR BLD: 44 % (ref 32–75)
NRBC # BLD: 0 K/UL (ref 0–0.01)
NRBC BLD-RTO: 0 PER 100 WBC
PLATELET # BLD AUTO: 341 K/UL (ref 150–400)
PMV BLD AUTO: 9.9 FL (ref 8.9–12.9)
POTASSIUM SERPL-SCNC: 4.2 MMOL/L (ref 3.5–5.1)
PROT SERPL-MCNC: 7 G/DL (ref 6.4–8.2)
RBC # BLD AUTO: 4.35 M/UL (ref 3.8–5.2)
SODIUM SERPL-SCNC: 141 MMOL/L (ref 136–145)
TRIGL SERPL-MCNC: 88 MG/DL (ref ?–150)
VIT B12 SERPL-MCNC: 433 PG/ML (ref 193–986)
VLDLC SERPL CALC-MCNC: 17.6 MG/DL
WBC # BLD AUTO: 7.5 K/UL (ref 3.6–11)

## 2022-02-10 PROCEDURE — 99214 OFFICE O/P EST MOD 30 MIN: CPT | Performed by: NURSE PRACTITIONER

## 2022-02-10 PROCEDURE — 83036 HEMOGLOBIN GLYCOSYLATED A1C: CPT | Performed by: NURSE PRACTITIONER

## 2022-02-10 RX ORDER — DULOXETIN HYDROCHLORIDE 60 MG/1
60 CAPSULE, DELAYED RELEASE ORAL DAILY
Qty: 90 CAPSULE | Refills: 1 | Status: SHIPPED | OUTPATIENT
Start: 2022-02-10 | End: 2022-08-03

## 2022-02-10 RX ORDER — FLUOCINONIDE 0.5 MG/G
CREAM TOPICAL
COMMUNITY
Start: 2021-12-07 | End: 2022-03-22

## 2022-02-10 RX ORDER — TRIAMCINOLONE ACETONIDE 1 MG/G
OINTMENT TOPICAL
COMMUNITY
Start: 2022-01-31 | End: 2022-02-25 | Stop reason: SDUPTHER

## 2022-02-10 RX ORDER — DULOXETIN HYDROCHLORIDE 30 MG/1
30 CAPSULE, DELAYED RELEASE ORAL DAILY
Qty: 90 CAPSULE | Refills: 1 | Status: SHIPPED | OUTPATIENT
Start: 2022-02-10 | End: 2022-08-03

## 2022-02-10 RX ORDER — TAMSULOSIN HYDROCHLORIDE 0.4 MG/1
0.4 CAPSULE ORAL DAILY
COMMUNITY
Start: 2022-01-31

## 2022-02-10 RX ORDER — BUSPIRONE HYDROCHLORIDE 15 MG/1
7.5 TABLET ORAL 3 TIMES DAILY
Qty: 180 TABLET | Refills: 1 | Status: SHIPPED | OUTPATIENT
Start: 2022-02-10 | End: 2022-05-03 | Stop reason: SDUPTHER

## 2022-02-10 NOTE — PROGRESS NOTES
Chief Complaint   Patient presents with    [facility] Care    Medication Refill    Skin Problem     painful red spots on hands, skin is cracked on hands and feet         1. \"Have you been to the ER, urgent care clinic since your last visit? Hospitalized since your last visit? \" Yes When: 1/22 Where: [facility] Reason for visit: kidney stones    2. \"Have you seen or consulted any other health care providers outside of the [address] [place] [address] since your last visit? \" Yes When: 5/21 Where: [facility] Reason for visit: gastric sleeve surgery     3. For patients over 45: Has the patient had a colonoscopy? Yes - no Care Gap present     If the patient is female:    4. For patients over 40: Has the patient had a mammogram? Yes - no Care Gap present    5. For patients over 21: Has the patient had a pap smear? No full hysterectomy   Pt declined flu shot    Have you had the covid vaccine. .. yes    3 most recent PHQ Screens 2/10/2022   Little interest or pleasure in doing things Not at all   Feeling down, depressed, irritable, or hopeless Not at all   Total Score PHQ 2 0       Health Maintenance Due   Topic Date Due    Hepatitis C Screening  Never done    COVID-19 Vaccine (1) Never done    Pneumococcal 0-64 years (1 of 2 - PPSV23) Never done    Foot Exam Q1  Never done    Cervical cancer screen  Never done    MICROALBUMIN Q1  04/22/2021    Lipid Screen  04/22/2021    Flu Vaccine (1) Never done    A1C test (Diabetic or Prediabetic)  09/15/2021

## 2022-02-10 NOTE — PATIENT INSTRUCTIONS

## 2022-02-10 NOTE — PROGRESS NOTES
[facility] Note     Duc Orr (: 1975) is a 41 y.o. female, established patient, here for evaluation of the following chief complaint(s):  Establish Care, Medication Refill, and Skin Problem (painful red spots on hands, skin is cracked on hands and feet)       ASSESSMENT/PLAN:  1. Diabetes mellitus type 2, noninsulin dependent (HCC)  -     AMB POC HEMOGLOBIN A1C  Lab Results   Component Value Date/Time    Hemoglobin A1c 6.5 (H) 2020 11:14 AM    Hemoglobin A1c (POC) 5.3 02/10/2022 08:55 AM   -     liraglutide (VICTOZA) 0.6 mg/0.1 mL (18 mg/3 mL) pnij; 1.8 mg by SubCUTAneous route daily. , Normal, Disp-4 Each, R-2, dose reduction given hypoglycemic episodes  -     METABOLIC PANEL, COMPREHENSIVE; Future  -     HEPATITIS C AB; Future  -      DIABETES FOOT EXAM  -     LIPID PANEL; Future  -     MICROALBUMIN, UR, RAND W/ MICROALB/CREAT RATIO; Future    2. Pigmented skin lesions  -     REFERRAL TO DERMATOLOGY    3. DEANN (generalized anxiety disorder)  -     busPIRone (BUSPAR) 15 mg tablet; Take 0.5 Tablets by mouth three (3) times daily. , Normal, Disp-180 Tablet, R-1  -     DULoxetine (Cymbalta) 60 mg capsule; Take 1 Capsule by mouth daily. Total daily dose 90 mg, Normal, Disp-90 Capsule, R-1  -     REFERRAL TO BEHAVIORAL HEALTH    4. Severe obesity ([address] s/p gastric sleeve  -     Follow up with bariatric surgery. -     VITAMIN B12 & FOLATE; Future  -     VITAMIN D, 25 HYDROXY; Future    5. Fibromyalgia  -     DULoxetine (Cymbalta) 60 mg capsule; Take 1 Capsule by mouth daily. Total daily dose 90 mg, Normal, Disp-90 Capsule, R-1    6. Need for hepatitis C screening test  -     HEPATITIS C AB; Future    7. Primary hypertension  -     METABOLIC PANEL, COMPREHENSIVE; Future  -Diet and lifestyle modification encouraged for weight loss and chronic disease prevention/ management    8. S/P  shunt  -     Followed by Neurology, Dr. Genny Mcelroy    10.  H/O gastric sleeve  -     VITAMIN B12 & FOLATE; Future  -     VITAMIN D, 25 HYDROXY; Future    11. Allergic reaction, history of  -     REFERRAL TO ALLERGY      Return in about 3 months (around 5/10/2022), or if symptoms worsen or fail to improve. SUBJECTIVE/OBJECTIVE:    Rose Sherwood is a 41 y.o. female seen today for DM HTN, anxiety and skin lesions    Derm:  Patient reports skin lesions to her hands and feet that developed after her gastric sleeve procedure in May 2017. It is unchanged since that time. Symptoms include flaking of skin. she reports: No, recent travel, new medications, changed in soaps/detergents, change in diet, new pets. Treatment to date has included topical steroid and moisturizer. Anxiety:  Patient complains of anxiety but she describes as \"bad\". She felt it was controlled at a higher dose of BuSpar but when receiving a refill she noticed it was at a lower dose. She enjoys music as a way to reduce her anxiety. Since being on a lower dose she has had breakthrough anxiety. She denies current suicidal and homicidal ideation. She complains of the following side effects from the treatment: none. Cardiovascular Review:  She has diabetes, hypertension and obesity. Diet and Lifestyle: generally follows a low fat low cholesterol diet, exercises sporadically  Home BP Monitoring: is well controlled at home, ranging 110's/60's. Pertinent ROS: taking medications as instructed, no medication side effects noted, no TIA's, no chest pain on exertion, no dyspnea on exertion, no swelling of ankles. Diabetes Mellitus:  Diabetic ROS - medication compliance: compliant most of the time, diabetic diet compliance: compliant all of the time, home glucose monitoring: is performed sporadically. 60-70 glucose, eating regularly    Further diabetic ROS: no polyuria or polydipsia, no chest pain, dyspnea or TIA's, no numbness, tingling or pain in extremities. Last eye exam approximately >1 yr ago.  Needs appointment      Review of Systems   Constitutional: Negative. HENT: Negative. Respiratory: Negative. Cardiovascular: Negative. Gastrointestinal: Negative. Genitourinary: Negative. Musculoskeletal: Negative. Skin: Positive for rash. Negative for color change. Neurological: Negative. Psychiatric/Behavioral: Negative for self-injury and suicidal ideas. The patient is nervous/anxious. Wt Readings from Last 3 Encounters:   02/10/22 188 lb (85.3 kg)   01/26/22 183 lb (83 kg)   01/18/21 234 lb (106.1 kg)     Temp Readings from Last 3 Encounters:   02/10/22 98.8 °F (37.1 °C) (Temporal)   01/26/22 97.5 °F (36.4 °C) (Temporal)   01/05/21 97.4 °F (36.3 °C) (Temporal)     BP Readings from Last 3 Encounters:   02/10/22 107/70   01/26/22 137/77   01/18/21 130/80     Pulse Readings from Last 3 Encounters:   02/10/22 83   01/26/22 89   01/18/21 78           PHYSICAL EXAMINATION:       General: Alert, cooperative, no distress  Respiratory: Breathing comfortably, in no acute respiratory distress. Clear to auscultation bilaterally. Cardiovascular: Regular rate and rhythm, S1, S2 normal, no murmur, click, rub or gallop. Extremities: no edema. Abdomen: Soft, non-tender, not distended. Bowel sounds normal. No masses or organomegaly. MSK: Extremities normal appearing, atraumatic, no effusion. Gait steady and unassisted. Skin: flat erythemic patches to the palm surface of the hands warm with lichenified edges and flaking, nontender to palpation. No fluctuant. Skin color, texture, turgor normal. No rashes or lesions on exposed skin. Neurologic: A/Ox3  Psychiatric: Normal affect. Mood euthymic.  Thoughts logical. Speech volume and speed normal    Diabetic foot exam:     Left Foot:   Visual Exam: normal    Pulse DP: 2+ (normal)   Filament test: normal sensation         Right Foot:   Visual Exam: normal    Pulse DP: 2+ (normal)   Filament test: normal sensation             Treatment risks/benefits/costs/interactions/alternatives discussed with patient. Advised patient to call back or return to office if symptoms worsen/change/persist. If patient cannot reach us or should anything more severe/urgent arise he/she should proceed directly to the nearest emergency department. Discussed expected course/resolution/complications of diagnosis in detail with patient. Patient expressed understanding with the diagnosis and plan. An electronic signature was used to authenticate this note.   -- Akua Singh NP

## 2022-02-21 DIAGNOSIS — G43.709 CHRONIC MIGRAINE W/O AURA W/O STATUS MIGRAINOSUS, NOT INTRACTABLE: Primary | ICD-10-CM

## 2022-02-21 RX ORDER — FROVATRIPTAN SUCCINATE 2.5 MG/1
2.5 TABLET, FILM COATED ORAL
Qty: 10 TABLET | Refills: 1 | Status: SHIPPED | OUTPATIENT
Start: 2022-02-21 | End: 2022-08-20 | Stop reason: SDUPTHER

## 2022-02-25 RX ORDER — TRIAMCINOLONE ACETONIDE 1 MG/G
OINTMENT TOPICAL
Qty: 30 G | Refills: 0 | Status: SHIPPED | OUTPATIENT
Start: 2022-02-25 | End: 2022-03-22

## 2022-02-25 NOTE — TELEPHONE ENCOUNTER
Historical medication:  Kenalog 0.1 % Oint     Last visit 02/10/2022 RYAN Coyle   Next appointment 3 months (05/2022)       Requested Prescriptions     Pending Prescriptions Disp Refills    triamcinolone acetonide (KENALOG) 0.1 % ointment 30 g 0     Sig: APPLY A THIN LAYER OF OINTMENT TOPICALLY TO AFFECTED AREA TWICE DAILY

## 2022-02-28 DIAGNOSIS — G93.2 IDIOPATHIC INTRACRANIAL HYPERTENSION: ICD-10-CM

## 2022-02-28 RX ORDER — ONDANSETRON 4 MG/1
TABLET, ORALLY DISINTEGRATING ORAL
Qty: 30 TABLET | Refills: 1 | Status: SHIPPED | OUTPATIENT
Start: 2022-02-28 | End: 2022-03-23

## 2022-03-01 RX ORDER — UBROGEPANT 100 MG/1
TABLET ORAL
Qty: 10 TABLET | Refills: 2 | Status: SHIPPED | OUTPATIENT
Start: 2022-03-01 | End: 2022-04-15 | Stop reason: SDUPTHER

## 2022-03-03 DIAGNOSIS — G43.709 CHRONIC MIGRAINE W/O AURA W/O STATUS MIGRAINOSUS, NOT INTRACTABLE: ICD-10-CM

## 2022-03-03 RX ORDER — GALCANEZUMAB 120 MG/ML
120 INJECTION, SOLUTION SUBCUTANEOUS
Qty: 1 ML | Refills: 3 | Status: SHIPPED | OUTPATIENT
Start: 2022-03-03 | End: 2022-06-21

## 2022-03-04 DIAGNOSIS — F99 INSOMNIA DUE TO OTHER MENTAL DISORDER: ICD-10-CM

## 2022-03-04 DIAGNOSIS — F41.1 GAD (GENERALIZED ANXIETY DISORDER): ICD-10-CM

## 2022-03-04 DIAGNOSIS — F51.05 INSOMNIA DUE TO OTHER MENTAL DISORDER: ICD-10-CM

## 2022-03-04 NOTE — TELEPHONE ENCOUNTER
Last Visit: 2/10/22 NP Maty Herrera  Next Appointment: Not scheduled  Previous Refill Encounter(s): 1/17/21 90 (30 d/s)    Requested Prescriptions     Pending Prescriptions Disp Refills    traZODone (DESYREL) 50 mg tablet 90 Tablet 5     Sig: Take 3 Tablets by mouth nightly.  Indications: insomnia associated with depression

## 2022-03-06 DIAGNOSIS — E55.9 VITAMIN D INSUFFICIENCY: ICD-10-CM

## 2022-03-06 RX ORDER — TRAZODONE HYDROCHLORIDE 50 MG/1
150 TABLET ORAL
Qty: 90 TABLET | Refills: 5 | Status: SHIPPED | OUTPATIENT
Start: 2022-03-06 | End: 2022-08-30 | Stop reason: SDUPTHER

## 2022-03-06 RX ORDER — CYANOCOBALAMIN (VITAMIN B-12) 500 MCG
800 TABLET ORAL DAILY
Qty: 180 TABLET | Refills: 2 | Status: SHIPPED | OUTPATIENT
Start: 2022-03-06

## 2022-03-09 ENCOUNTER — PATIENT MESSAGE (OUTPATIENT)
Dept: FAMILY MEDICINE CLINIC | Age: 47
End: 2022-03-09

## 2022-03-18 PROBLEM — E66.01 SEVERE OBESITY (HCC): Status: ACTIVE | Noted: 2020-10-13

## 2022-03-19 PROBLEM — G93.2 IDIOPATHIC INTRACRANIAL HYPERTENSION: Status: ACTIVE | Noted: 2020-12-18

## 2022-03-19 PROBLEM — Z98.2 S/P VP SHUNT: Status: ACTIVE | Noted: 2020-12-18

## 2022-03-19 PROBLEM — J45.20 MILD INTERMITTENT ASTHMA: Status: ACTIVE | Noted: 2020-05-20

## 2022-03-19 PROBLEM — D35.02 ADRENAL ADENOMA, LEFT: Status: ACTIVE | Noted: 2020-07-17

## 2022-03-19 PROBLEM — M48.062 SPINAL STENOSIS OF LUMBAR REGION WITH NEUROGENIC CLAUDICATION: Status: ACTIVE | Noted: 2021-01-04

## 2022-03-20 ENCOUNTER — PATIENT MESSAGE (OUTPATIENT)
Dept: FAMILY MEDICINE CLINIC | Age: 47
End: 2022-03-20

## 2022-03-20 DIAGNOSIS — N20.0 NEPHROLITHIASIS: Primary | ICD-10-CM

## 2022-03-20 PROBLEM — E11.40 TYPE 2 DIABETES MELLITUS WITH DIABETIC NEUROPATHY (HCC): Status: ACTIVE | Noted: 2020-11-11

## 2022-03-22 RX ORDER — CLOBETASOL PROPIONATE 0.5 MG/G
OINTMENT TOPICAL 2 TIMES DAILY
Qty: 60 G | Refills: 1 | Status: SHIPPED | OUTPATIENT
Start: 2022-03-22 | End: 2022-05-16

## 2022-03-22 RX ORDER — CLOBETASOL PROPIONATE 0.5 MG/G
OINTMENT TOPICAL
COMMUNITY
Start: 2022-03-06 | End: 2022-03-22 | Stop reason: SDUPTHER

## 2022-03-23 DIAGNOSIS — G93.2 IDIOPATHIC INTRACRANIAL HYPERTENSION: ICD-10-CM

## 2022-03-23 RX ORDER — ONDANSETRON 4 MG/1
TABLET, ORALLY DISINTEGRATING ORAL
Qty: 30 TABLET | Refills: 1 | Status: SHIPPED | OUTPATIENT
Start: 2022-03-23 | End: 2022-04-15 | Stop reason: SDUPTHER

## 2022-04-01 RX ORDER — ALBUTEROL SULFATE 90 UG/1
2 AEROSOL, METERED RESPIRATORY (INHALATION)
Qty: 1 EACH | Refills: 1 | Status: SHIPPED | OUTPATIENT
Start: 2022-04-01 | End: 2022-05-25

## 2022-04-01 NOTE — TELEPHONE ENCOUNTER
Patient mychart request for refill. Thanks, Arnold Valle    Last Visit: 2/10/22 NP Terrell Saldana   Next Appointment: Not scheduled  Previous Refill Encounter(s): 3/25/21 1    Requested Prescriptions     Pending Prescriptions Disp Refills    albuterol (PROVENTIL HFA, VENTOLIN HFA, PROAIR HFA) 90 mcg/actuation inhaler 1 Each 1     Sig: Take 2 Puffs by inhalation every six (6) hours as needed for Wheezing or Cough.

## 2022-04-15 DIAGNOSIS — M79.7 FIBROMYALGIA: ICD-10-CM

## 2022-04-15 DIAGNOSIS — F41.1 GAD (GENERALIZED ANXIETY DISORDER): ICD-10-CM

## 2022-04-15 DIAGNOSIS — E11.9 DIABETES MELLITUS TYPE 2, NONINSULIN DEPENDENT (HCC): ICD-10-CM

## 2022-04-15 DIAGNOSIS — E55.9 VITAMIN D INSUFFICIENCY: ICD-10-CM

## 2022-04-15 DIAGNOSIS — G93.2 IDIOPATHIC INTRACRANIAL HYPERTENSION: ICD-10-CM

## 2022-04-15 DIAGNOSIS — E66.01 SEVERE OBESITY (HCC): ICD-10-CM

## 2022-04-15 RX ORDER — CLOBETASOL PROPIONATE 0.5 MG/G
OINTMENT TOPICAL 2 TIMES DAILY
Qty: 60 G | Refills: 1 | Status: CANCELLED | OUTPATIENT
Start: 2022-04-15

## 2022-04-15 RX ORDER — ONDANSETRON 4 MG/1
4 TABLET, ORALLY DISINTEGRATING ORAL
Qty: 30 TABLET | Refills: 1 | Status: SHIPPED | OUTPATIENT
Start: 2022-04-15 | End: 2022-05-23

## 2022-04-15 RX ORDER — BUSPIRONE HYDROCHLORIDE 15 MG/1
7.5 TABLET ORAL 3 TIMES DAILY
Qty: 180 TABLET | Refills: 1 | Status: CANCELLED | OUTPATIENT
Start: 2022-04-15

## 2022-04-15 RX ORDER — DULOXETIN HYDROCHLORIDE 60 MG/1
60 CAPSULE, DELAYED RELEASE ORAL DAILY
Qty: 90 CAPSULE | Refills: 1 | Status: CANCELLED | OUTPATIENT
Start: 2022-04-15

## 2022-04-15 RX ORDER — CYANOCOBALAMIN (VITAMIN B-12) 500 MCG
800 TABLET ORAL DAILY
Qty: 180 TABLET | Refills: 2 | Status: CANCELLED | OUTPATIENT
Start: 2022-04-15

## 2022-04-15 RX ORDER — DULOXETIN HYDROCHLORIDE 30 MG/1
30 CAPSULE, DELAYED RELEASE ORAL DAILY
Qty: 90 CAPSULE | Refills: 1 | Status: CANCELLED | OUTPATIENT
Start: 2022-04-15

## 2022-04-15 RX ORDER — PEN NEEDLE, DIABETIC 30 GX3/16"
NEEDLE, DISPOSABLE MISCELLANEOUS
Qty: 1 EACH | Refills: 11 | Status: CANCELLED | OUTPATIENT
Start: 2022-04-15

## 2022-04-15 RX ORDER — ALBUTEROL SULFATE 90 UG/1
2 AEROSOL, METERED RESPIRATORY (INHALATION)
Qty: 1 EACH | Refills: 1 | Status: CANCELLED | OUTPATIENT
Start: 2022-04-15

## 2022-04-18 RX ORDER — AMITRIPTYLINE HYDROCHLORIDE 50 MG/1
100 TABLET, FILM COATED ORAL
Qty: 60 TABLET | Refills: 5 | Status: SHIPPED | OUTPATIENT
Start: 2022-04-18 | End: 2022-08-30 | Stop reason: SDUPTHER

## 2022-04-18 NOTE — TELEPHONE ENCOUNTER
Patient request via [employer] for refill of 8 prescriptions. All but this one has refill/new rx available at the pharmacy and sent patient message to contact [facility].  Katia Lopez    Last Visit: 2/10/22 RYAN Voss  Next Appointment: None  Previous Refill Encounter(s): 1/17/22 60    Requested Prescriptions     Pending Prescriptions Disp Refills    amitriptyline (ELAVIL) 50 mg tablet 60 Tablet 5     Sig: Take 2 Tablets by mouth nightly.  To prevent migraine

## 2022-04-20 DIAGNOSIS — M79.7 FIBROMYALGIA: ICD-10-CM

## 2022-04-20 DIAGNOSIS — M79.662 PAIN IN BOTH LOWER LEGS: ICD-10-CM

## 2022-04-20 DIAGNOSIS — M79.661 PAIN IN BOTH LOWER LEGS: ICD-10-CM

## 2022-04-21 RX ORDER — TIZANIDINE 4 MG/1
4 TABLET ORAL 3 TIMES DAILY
Qty: 90 TABLET | Refills: 0 | Status: SHIPPED | OUTPATIENT
Start: 2022-04-21 | End: 2022-06-17

## 2022-05-02 ENCOUNTER — PATIENT MESSAGE (OUTPATIENT)
Dept: FAMILY MEDICINE CLINIC | Age: 47
End: 2022-05-02

## 2022-05-02 DIAGNOSIS — F41.1 GAD (GENERALIZED ANXIETY DISORDER): ICD-10-CM

## 2022-05-02 DIAGNOSIS — M79.7 FIBROMYALGIA: ICD-10-CM

## 2022-05-02 DIAGNOSIS — M79.662 PAIN IN BOTH LOWER LEGS: ICD-10-CM

## 2022-05-02 DIAGNOSIS — M79.661 PAIN IN BOTH LOWER LEGS: ICD-10-CM

## 2022-05-02 RX ORDER — PREGABALIN 225 MG/1
225 CAPSULE ORAL 3 TIMES DAILY
Qty: 90 CAPSULE | Refills: 1 | Status: SHIPPED | OUTPATIENT
Start: 2022-05-02 | End: 2022-06-30 | Stop reason: SDUPTHER

## 2022-05-03 RX ORDER — BUSPIRONE HYDROCHLORIDE 15 MG/1
15 TABLET ORAL 3 TIMES DAILY
Qty: 180 TABLET | Refills: 1 | Status: SHIPPED | OUTPATIENT
Start: 2022-05-03 | End: 2022-08-30 | Stop reason: SDUPTHER

## 2022-05-12 ENCOUNTER — PATIENT MESSAGE (OUTPATIENT)
Dept: FAMILY MEDICINE CLINIC | Age: 47
End: 2022-05-12

## 2022-05-12 DIAGNOSIS — E11.9 DIABETES MELLITUS TYPE 2, NONINSULIN DEPENDENT (HCC): ICD-10-CM

## 2022-05-16 RX ORDER — CLOBETASOL PROPIONATE 0.5 MG/G
OINTMENT TOPICAL
Qty: 60 G | Refills: 1 | Status: SHIPPED | OUTPATIENT
Start: 2022-05-16 | End: 2022-10-13 | Stop reason: SDUPTHER

## 2022-05-16 RX ORDER — BLOOD SUGAR DIAGNOSTIC
STRIP MISCELLANEOUS
Qty: 100 STRIP | Refills: 1 | Status: SHIPPED | OUTPATIENT
Start: 2022-05-16 | End: 2022-10-13 | Stop reason: SDUPTHER

## 2022-05-16 RX ORDER — LANCETS
EACH MISCELLANEOUS
Qty: 1 EACH | Refills: 11 | Status: SHIPPED | OUTPATIENT
Start: 2022-05-16 | End: 2022-10-13 | Stop reason: SDUPTHER

## 2022-05-16 RX ORDER — BLOOD-GLUCOSE METER
EACH MISCELLANEOUS
Qty: 1 EACH | Refills: 0 | Status: SHIPPED | OUTPATIENT
Start: 2022-05-16 | End: 2022-10-13 | Stop reason: SDUPTHER

## 2022-05-21 DIAGNOSIS — G93.2 IDIOPATHIC INTRACRANIAL HYPERTENSION: ICD-10-CM

## 2022-05-23 RX ORDER — ONDANSETRON 4 MG/1
TABLET, ORALLY DISINTEGRATING ORAL
Qty: 30 TABLET | Refills: 1 | Status: SHIPPED | OUTPATIENT
Start: 2022-05-23 | End: 2022-06-23

## 2022-05-25 RX ORDER — ALBUTEROL SULFATE 90 UG/1
AEROSOL, METERED RESPIRATORY (INHALATION)
Qty: 6.7 EACH | Refills: 1 | Status: SHIPPED | OUTPATIENT
Start: 2022-05-25

## 2022-06-17 DIAGNOSIS — M79.7 FIBROMYALGIA: ICD-10-CM

## 2022-06-17 DIAGNOSIS — M79.661 PAIN IN BOTH LOWER LEGS: ICD-10-CM

## 2022-06-17 DIAGNOSIS — M79.662 PAIN IN BOTH LOWER LEGS: ICD-10-CM

## 2022-06-17 RX ORDER — TIZANIDINE 4 MG/1
TABLET ORAL
Qty: 90 TABLET | Refills: 0 | Status: SHIPPED | OUTPATIENT
Start: 2022-06-17 | End: 2022-07-28 | Stop reason: SDUPTHER

## 2022-06-21 DIAGNOSIS — G43.709 CHRONIC MIGRAINE W/O AURA W/O STATUS MIGRAINOSUS, NOT INTRACTABLE: ICD-10-CM

## 2022-06-21 DIAGNOSIS — G93.2 IDIOPATHIC INTRACRANIAL HYPERTENSION: ICD-10-CM

## 2022-06-21 RX ORDER — GALCANEZUMAB 120 MG/ML
INJECTION, SOLUTION SUBCUTANEOUS
Qty: 1 EACH | Refills: 3 | Status: SHIPPED | OUTPATIENT
Start: 2022-06-21

## 2022-06-23 RX ORDER — ONDANSETRON 4 MG/1
TABLET, ORALLY DISINTEGRATING ORAL
Qty: 30 TABLET | Refills: 1 | Status: SHIPPED | OUTPATIENT
Start: 2022-06-23 | End: 2022-07-29

## 2022-06-29 DIAGNOSIS — M79.7 FIBROMYALGIA: ICD-10-CM

## 2022-06-29 DIAGNOSIS — M79.662 PAIN IN BOTH LOWER LEGS: ICD-10-CM

## 2022-06-29 DIAGNOSIS — M79.661 PAIN IN BOTH LOWER LEGS: ICD-10-CM

## 2022-06-30 ENCOUNTER — TELEPHONE (OUTPATIENT)
Dept: NEUROLOGY | Age: 47
End: 2022-06-30

## 2022-06-30 ENCOUNTER — PATIENT MESSAGE (OUTPATIENT)
Dept: NEUROLOGY | Age: 47
End: 2022-06-30

## 2022-06-30 DIAGNOSIS — M79.662 PAIN IN BOTH LOWER LEGS: ICD-10-CM

## 2022-06-30 DIAGNOSIS — M79.661 PAIN IN BOTH LOWER LEGS: ICD-10-CM

## 2022-06-30 DIAGNOSIS — M79.7 FIBROMYALGIA: ICD-10-CM

## 2022-06-30 RX ORDER — PREGABALIN 225 MG/1
225 CAPSULE ORAL 3 TIMES DAILY
Qty: 90 CAPSULE | Refills: 1 | Status: CANCELLED | OUTPATIENT
Start: 2022-06-30

## 2022-06-30 NOTE — TELEPHONE ENCOUNTER
Spoke with pharmacist in regards to patient's Lyrica 225mg. They do not have a prescription on file for Lyrica, requesting Dr.Sangha cates.

## 2022-07-01 RX ORDER — PREGABALIN 225 MG/1
225 CAPSULE ORAL 3 TIMES DAILY
Qty: 90 CAPSULE | Refills: 1 | Status: SHIPPED | OUTPATIENT
Start: 2022-07-01 | End: 2022-07-07 | Stop reason: SDUPTHER

## 2022-07-01 RX ORDER — PREGABALIN 225 MG/1
225 CAPSULE ORAL 3 TIMES DAILY
Qty: 90 CAPSULE | Refills: 1 | Status: SHIPPED | OUTPATIENT
Start: 2022-07-01 | End: 2022-07-08

## 2022-07-01 NOTE — TELEPHONE ENCOUNTER
Received refill request from patient for Lyrica. Last refill:5/1/22    Next appointment:None     Please review and fill if warranted.

## 2022-07-05 ENCOUNTER — PATIENT MESSAGE (OUTPATIENT)
Dept: NEUROLOGY | Age: 47
End: 2022-07-05

## 2022-07-05 DIAGNOSIS — M79.7 FIBROMYALGIA: ICD-10-CM

## 2022-07-05 DIAGNOSIS — M79.662 PAIN IN BOTH LOWER LEGS: ICD-10-CM

## 2022-07-05 DIAGNOSIS — M79.661 PAIN IN BOTH LOWER LEGS: ICD-10-CM

## 2022-07-07 NOTE — TELEPHONE ENCOUNTER
Confirmed with [facility] pharmacy that they are unable to supply refill of Lyrica to this patient due to medication being out of stock at pharmacy and warehouse. Prescription would need to be sent to an alternative pharmacy. Patient has moved to [facility] would like to use [facility] pharmacy which is updated in chart.

## 2022-07-07 NOTE — TELEPHONE ENCOUNTER
From: Tresa Smith  To: Chloe Staton MD  Sent: 7/5/2022 7:59 PM EDT  Subject: Help please     So my Pregablin was called into [facility] they apparently don't have it and the warehouse is out, got it xfered to [facility]. ...  They don't take my insurance, finally found a pharmacy that will take my insurance but they won't xfer this a second time and that is ordering it now for tomorrow, can we please send the Pregablin 225 x 3 a day to [facility] pharmacy their fax # is [phone]

## 2022-07-08 RX ORDER — PREGABALIN 225 MG/1
225 CAPSULE ORAL 3 TIMES DAILY
Qty: 90 CAPSULE | Refills: 1 | Status: SHIPPED | OUTPATIENT
Start: 2022-07-08 | End: 2022-08-30 | Stop reason: SDUPTHER

## 2022-07-28 DIAGNOSIS — M79.7 FIBROMYALGIA: ICD-10-CM

## 2022-07-28 DIAGNOSIS — G93.2 IDIOPATHIC INTRACRANIAL HYPERTENSION: ICD-10-CM

## 2022-07-28 DIAGNOSIS — M79.661 PAIN IN BOTH LOWER LEGS: ICD-10-CM

## 2022-07-28 DIAGNOSIS — M79.662 PAIN IN BOTH LOWER LEGS: ICD-10-CM

## 2022-07-29 RX ORDER — ONDANSETRON 4 MG/1
TABLET, ORALLY DISINTEGRATING ORAL
Qty: 30 TABLET | Refills: 0 | Status: SHIPPED | OUTPATIENT
Start: 2022-07-29 | End: 2022-08-29

## 2022-07-29 NOTE — TELEPHONE ENCOUNTER
Last refill:6/17/22    Last visit:1/26/22    Next appointment:NONE    Please review and fill if warranted.

## 2022-07-30 DIAGNOSIS — M79.7 FIBROMYALGIA: ICD-10-CM

## 2022-07-30 DIAGNOSIS — F41.1 GAD (GENERALIZED ANXIETY DISORDER): ICD-10-CM

## 2022-07-30 RX ORDER — TIZANIDINE 4 MG/1
4 TABLET ORAL 3 TIMES DAILY
Qty: 90 TABLET | Refills: 0 | Status: SHIPPED | OUTPATIENT
Start: 2022-07-30 | End: 2022-08-22

## 2022-08-03 RX ORDER — DULOXETIN HYDROCHLORIDE 30 MG/1
CAPSULE, DELAYED RELEASE ORAL
Qty: 30 CAPSULE | Refills: 0 | Status: SHIPPED | OUTPATIENT
Start: 2022-08-03 | End: 2022-08-29

## 2022-08-03 RX ORDER — DULOXETIN HYDROCHLORIDE 60 MG/1
CAPSULE, DELAYED RELEASE ORAL
Qty: 30 CAPSULE | Refills: 0 | Status: SHIPPED | OUTPATIENT
Start: 2022-08-03 | End: 2022-08-29

## 2022-08-03 NOTE — TELEPHONE ENCOUNTER
Left VM for pt to call office back.  If pt calls back please let her know prescription was approved by Dex Bennett and that she is overdue for office visit follow up appt 08/03/22TM

## 2022-08-09 DIAGNOSIS — E11.9 DIABETES MELLITUS TYPE 2, NONINSULIN DEPENDENT (HCC): ICD-10-CM

## 2022-08-09 DIAGNOSIS — E66.01 SEVERE OBESITY (HCC): ICD-10-CM

## 2022-08-10 ENCOUNTER — PATIENT MESSAGE (OUTPATIENT)
Dept: FAMILY MEDICINE CLINIC | Age: 47
End: 2022-08-10

## 2022-08-20 DIAGNOSIS — G43.709 CHRONIC MIGRAINE W/O AURA W/O STATUS MIGRAINOSUS, NOT INTRACTABLE: ICD-10-CM

## 2022-08-26 RX ORDER — FROVATRIPTAN SUCCINATE 2.5 MG/1
2.5 TABLET, FILM COATED ORAL
Qty: 10 TABLET | Refills: 1 | Status: SHIPPED | OUTPATIENT
Start: 2022-08-26

## 2022-08-27 DIAGNOSIS — E11.9 DIABETES MELLITUS TYPE 2, NONINSULIN DEPENDENT (HCC): ICD-10-CM

## 2022-08-27 DIAGNOSIS — E66.01 SEVERE OBESITY (HCC): ICD-10-CM

## 2022-08-27 DIAGNOSIS — F41.1 GAD (GENERALIZED ANXIETY DISORDER): ICD-10-CM

## 2022-08-27 DIAGNOSIS — G93.2 IDIOPATHIC INTRACRANIAL HYPERTENSION: ICD-10-CM

## 2022-08-27 DIAGNOSIS — M79.7 FIBROMYALGIA: ICD-10-CM

## 2022-08-29 RX ORDER — LIRAGLUTIDE 6 MG/ML
INJECTION SUBCUTANEOUS
Qty: 9 ML | Refills: 0 | Status: SHIPPED | OUTPATIENT
Start: 2022-08-29 | End: 2022-10-13 | Stop reason: SDUPTHER

## 2022-08-29 RX ORDER — DULOXETIN HYDROCHLORIDE 30 MG/1
CAPSULE, DELAYED RELEASE ORAL
Qty: 30 CAPSULE | Refills: 0 | Status: SHIPPED | OUTPATIENT
Start: 2022-08-29 | End: 2022-10-18

## 2022-08-29 RX ORDER — ONDANSETRON 4 MG/1
TABLET, ORALLY DISINTEGRATING ORAL
Qty: 30 TABLET | Refills: 0 | Status: SHIPPED | OUTPATIENT
Start: 2022-08-29 | End: 2022-09-27

## 2022-08-29 RX ORDER — DULOXETIN HYDROCHLORIDE 60 MG/1
CAPSULE, DELAYED RELEASE ORAL
Qty: 30 CAPSULE | Refills: 0 | Status: SHIPPED | OUTPATIENT
Start: 2022-08-29 | End: 2022-10-18

## 2022-08-30 DIAGNOSIS — M79.7 FIBROMYALGIA: ICD-10-CM

## 2022-08-30 DIAGNOSIS — M79.661 PAIN IN BOTH LOWER LEGS: ICD-10-CM

## 2022-08-30 DIAGNOSIS — M79.662 PAIN IN BOTH LOWER LEGS: ICD-10-CM

## 2022-08-31 RX ORDER — PREGABALIN 225 MG/1
225 CAPSULE ORAL 3 TIMES DAILY
Qty: 90 CAPSULE | Refills: 1 | Status: SHIPPED | OUTPATIENT
Start: 2022-08-31 | End: 2022-10-01 | Stop reason: SDUPTHER

## 2022-09-26 DIAGNOSIS — G93.2 IDIOPATHIC INTRACRANIAL HYPERTENSION: ICD-10-CM

## 2022-09-27 RX ORDER — ONDANSETRON 4 MG/1
TABLET, ORALLY DISINTEGRATING ORAL
Qty: 30 TABLET | Refills: 0 | Status: SHIPPED | OUTPATIENT
Start: 2022-09-27 | End: 2022-10-01 | Stop reason: SDUPTHER

## 2022-10-01 DIAGNOSIS — F51.05 INSOMNIA DUE TO OTHER MENTAL DISORDER: ICD-10-CM

## 2022-10-01 DIAGNOSIS — E11.9 DIABETES MELLITUS TYPE 2, NONINSULIN DEPENDENT (HCC): ICD-10-CM

## 2022-10-01 DIAGNOSIS — M79.662 PAIN IN BOTH LOWER LEGS: ICD-10-CM

## 2022-10-01 DIAGNOSIS — F41.1 GAD (GENERALIZED ANXIETY DISORDER): ICD-10-CM

## 2022-10-01 DIAGNOSIS — E66.01 SEVERE OBESITY (HCC): ICD-10-CM

## 2022-10-01 DIAGNOSIS — M79.7 FIBROMYALGIA: ICD-10-CM

## 2022-10-01 DIAGNOSIS — M79.661 PAIN IN BOTH LOWER LEGS: ICD-10-CM

## 2022-10-01 DIAGNOSIS — F99 INSOMNIA DUE TO OTHER MENTAL DISORDER: ICD-10-CM

## 2022-10-01 DIAGNOSIS — G93.2 IDIOPATHIC INTRACRANIAL HYPERTENSION: ICD-10-CM

## 2022-10-03 RX ORDER — LIRAGLUTIDE 6 MG/ML
1.8 INJECTION SUBCUTANEOUS
Qty: 9 ML | Refills: 0 | OUTPATIENT
Start: 2022-10-03

## 2022-10-03 RX ORDER — DULOXETIN HYDROCHLORIDE 60 MG/1
60 CAPSULE, DELAYED RELEASE ORAL DAILY
Qty: 30 CAPSULE | Refills: 0 | OUTPATIENT
Start: 2022-10-03

## 2022-10-03 RX ORDER — CLOBETASOL PROPIONATE 0.5 MG/G
OINTMENT TOPICAL 2 TIMES DAILY
Qty: 60 G | Refills: 1 | OUTPATIENT
Start: 2022-10-03

## 2022-10-03 RX ORDER — DULOXETIN HYDROCHLORIDE 30 MG/1
30 CAPSULE, DELAYED RELEASE ORAL DAILY
Qty: 30 CAPSULE | Refills: 0 | OUTPATIENT
Start: 2022-10-03

## 2022-10-03 RX ORDER — AMITRIPTYLINE HYDROCHLORIDE 50 MG/1
100 TABLET, FILM COATED ORAL
Qty: 60 TABLET | Refills: 0 | OUTPATIENT
Start: 2022-10-03

## 2022-10-03 RX ORDER — TRAZODONE HYDROCHLORIDE 50 MG/1
150 TABLET ORAL
Qty: 90 TABLET | Refills: 0 | OUTPATIENT
Start: 2022-10-03

## 2022-10-03 RX ORDER — BUSPIRONE HYDROCHLORIDE 15 MG/1
15 TABLET ORAL 3 TIMES DAILY
Qty: 90 TABLET | Refills: 0 | OUTPATIENT
Start: 2022-10-03

## 2022-10-03 NOTE — TELEPHONE ENCOUNTER
NP Rachele Campuzano,    Patient mychart request for refills. Requesting each month. **Patient is still in [place]**    Advised patient via [employer] appt is needed as f/up was due in May, and advised refill requests were sent for review. Thanks, Cherie Bradley    Last Visit: 2/10/22 NP Rachele Campuzano  Next Appointment: None  Previous Refill Encounter(s):   Clobetasol 22 60g + 1  Duloxetine 60mg 22 30  Duloxetine 30mg 22 30  Victoza 22 9ml  Trazodone 22 90  Amitriptyline 22 60  Busparone 22 90    Requested Prescriptions     Pending Prescriptions Disp Refills    clobetasoL (TEMOVATE) 0.05 % ointment 60 g 1     Sig: Apply  to affected area two (2) times a day. APPLY TO AFFECTED AREA    DULoxetine (CYMBALTA) 60 mg capsule 30 Capsule 0     Sig: Take 1 Capsule by mouth daily. Take with 30mg for total dose of 90mg daily. DULoxetine (CYMBALTA) 30 mg capsule 30 Capsule 0     Sig: Take 1 Capsule by mouth daily. Take with 60mg for total dose of 90mg daily    liraglutide (Victoza 3-Mirza) 0.6 mg/0.1 mL (18 mg/3 mL) pnij 9 mL 0     Si.8 mg by SubCUTAneous route every morning. traZODone (DESYREL) 50 mg tablet 90 Tablet 0     Sig: Take 3 Tablets by mouth nightly. Indications: insomnia associated with depression    amitriptyline (ELAVIL) 50 mg tablet 60 Tablet 0     Sig: Take 2 Tablets by mouth nightly. To prevent migraine    busPIRone (BUSPAR) 15 mg tablet 90 Tablet 0     Sig: Take 1 Tablet by mouth three (3) times daily. For [facility] in place:   Recommendation Provided To:    Intervention Detail: New Rx: 7, reason: Patient Preference  Gap Closed?:   Intervention Accepted By:   Time Spent (min): 15

## 2022-10-06 DIAGNOSIS — G93.2 IDIOPATHIC INTRACRANIAL HYPERTENSION: ICD-10-CM

## 2022-10-06 RX ORDER — PREGABALIN 225 MG/1
225 CAPSULE ORAL 3 TIMES DAILY
Qty: 90 CAPSULE | Refills: 1 | Status: SHIPPED | OUTPATIENT
Start: 2022-10-06

## 2022-10-06 RX ORDER — ONDANSETRON 4 MG/1
4 TABLET, ORALLY DISINTEGRATING ORAL
Qty: 30 TABLET | Refills: 0 | Status: SHIPPED | OUTPATIENT
Start: 2022-10-06 | End: 2022-10-07

## 2022-10-06 RX ORDER — TIZANIDINE 4 MG/1
4 TABLET ORAL 3 TIMES DAILY
Qty: 90 TABLET | Refills: 1 | Status: SHIPPED | OUTPATIENT
Start: 2022-10-06

## 2022-10-07 RX ORDER — ONDANSETRON 4 MG/1
TABLET, ORALLY DISINTEGRATING ORAL
Qty: 30 TABLET | Refills: 0 | Status: SHIPPED | OUTPATIENT
Start: 2022-10-07

## 2022-10-13 ENCOUNTER — VIRTUAL VISIT (OUTPATIENT)
Dept: FAMILY MEDICINE CLINIC | Age: 47
End: 2022-10-13
Payer: MEDICAID

## 2022-10-13 DIAGNOSIS — F41.1 GAD (GENERALIZED ANXIETY DISORDER): Primary | ICD-10-CM

## 2022-10-13 DIAGNOSIS — F51.05 INSOMNIA DUE TO OTHER MENTAL DISORDER: ICD-10-CM

## 2022-10-13 DIAGNOSIS — M79.7 FIBROMYALGIA: ICD-10-CM

## 2022-10-13 DIAGNOSIS — E66.01 SEVERE OBESITY (HCC): ICD-10-CM

## 2022-10-13 DIAGNOSIS — E11.9 DIABETES MELLITUS TYPE 2, NONINSULIN DEPENDENT (HCC): ICD-10-CM

## 2022-10-13 DIAGNOSIS — F99 INSOMNIA DUE TO OTHER MENTAL DISORDER: ICD-10-CM

## 2022-10-13 PROBLEM — Z90.710 HISTORY OF TOTAL HYSTERECTOMY: Status: ACTIVE | Noted: 2022-10-13

## 2022-10-13 PROCEDURE — 3044F HG A1C LEVEL LT 7.0%: CPT | Performed by: NURSE PRACTITIONER

## 2022-10-13 PROCEDURE — 99212 OFFICE O/P EST SF 10 MIN: CPT | Performed by: NURSE PRACTITIONER

## 2022-10-13 RX ORDER — CLOBETASOL PROPIONATE 0.5 MG/G
OINTMENT TOPICAL
Qty: 60 G | Refills: 1 | Status: SHIPPED | OUTPATIENT
Start: 2022-10-13

## 2022-10-13 RX ORDER — TRAZODONE HYDROCHLORIDE 100 MG/1
200 TABLET ORAL
Qty: 180 TABLET | Refills: 1 | Status: SHIPPED | OUTPATIENT
Start: 2022-10-13

## 2022-10-13 RX ORDER — AMITRIPTYLINE HYDROCHLORIDE 100 MG/1
100 TABLET, FILM COATED ORAL
Qty: 90 TABLET | Refills: 1 | Status: SHIPPED | OUTPATIENT
Start: 2022-10-13

## 2022-10-13 RX ORDER — BLOOD-GLUCOSE METER
EACH MISCELLANEOUS
Qty: 1 EACH | Refills: 0 | Status: SHIPPED | OUTPATIENT
Start: 2022-10-13

## 2022-10-13 RX ORDER — BLOOD SUGAR DIAGNOSTIC
STRIP MISCELLANEOUS
Qty: 100 STRIP | Refills: 1 | Status: SHIPPED | OUTPATIENT
Start: 2022-10-13

## 2022-10-13 RX ORDER — DICLOFENAC SODIUM 10 MG/G
2 GEL TOPICAL 4 TIMES DAILY
Qty: 450 G | Refills: 1 | Status: SHIPPED | OUTPATIENT
Start: 2022-10-13

## 2022-10-13 RX ORDER — LANCETS
EACH MISCELLANEOUS
Qty: 1 EACH | Refills: 11 | Status: SHIPPED | OUTPATIENT
Start: 2022-10-13

## 2022-10-13 RX ORDER — PEN NEEDLE, DIABETIC 30 GX3/16"
NEEDLE, DISPOSABLE MISCELLANEOUS
Qty: 1 EACH | Refills: 11 | Status: SHIPPED | OUTPATIENT
Start: 2022-10-13

## 2022-10-13 RX ORDER — LIRAGLUTIDE 6 MG/ML
1.8 INJECTION SUBCUTANEOUS DAILY
Qty: 9 ML | Refills: 1 | Status: SHIPPED | OUTPATIENT
Start: 2022-10-13

## 2022-10-13 NOTE — PROGRESS NOTES
[facility] Note  Edin Ramires is a 41 y.o. female who was seen by synchronous (real-time) audio-video technology on 10/13/2022 for Medication Refill        Assessment & Plan:   Diagnoses and all orders for this visit:    1. DEANN (generalized anxiety disorder)  - Stable    2. Diabetes mellitus type 2, noninsulin dependent (HCC)  -     Blood-Glucose Meter (OneTouch Verio Meter) misc; CHECK BLOOD GLUCOSE TWICE A DAY, DX: E11.9  -     glucose blood VI test strips (OneTouch Verio test strips) strip; CHECK BLOOD GLUCOSE TWICE A DAY, DX: E11.9  -     Insulin Needles, Disposable, 31 gauge x 5/16\" ndle; Use one daily. -     lancets misc; Check 2x/day and prn. DX: E11.9  -     liraglutide (Victoza 3-Mirza) 0.6 mg/0.1 mL (18 mg/3 mL) pnij; 1.8 mg by SubCUTAneous route daily. 3. Severe obesity (HCC)  -     Insulin Needles, Disposable, 31 gauge x 5/16\" ndle; Use one daily. -     liraglutide (Victoza 3-Mirza) 0.6 mg/0.1 mL (18 mg/3 mL) pnij; 1.8 mg by SubCUTAneous route daily. 4. Insomnia due to other mental disorder  -    INCREASE traZODone (DESYREL) 100 mg tablet; Take 2 Tablets by mouth nightly. -     amitriptyline (ELAVIL) 100 mg tablet; Take 1 Tablet by mouth nightly. 5. Fibromyalgia w/ back pain today  -    ADD diclofenac (VOLTAREN) 1 % gel; Apply 2 g to affected area four (4) times daily.  - Followed by Dr. Bill Esquivel    Other orders  -     clobetasoL (TEMOVATE) 0.05 % ointment; APPLY TO AFFECTED AREA TWICE A DAY    Follow-up and Dispositions    Return in about 3 months (around 1/13/2023). NOTE: Currently in [place]. Plans to return the beginning of January and will schedule appointment for in person evaluation as well as to update her blood work. I spent at least 15 minutes on this visit with this established patient.     Subjective:     Edin Ramires is a 41 y.o. female seen for DM, sleep problem, anxiety    Cardiovascular Review:  She has diabetes, hypertension, hyperlipidemia, and obesity. Diet and Lifestyle: generally follows a low sodium diet, sedentary  Home BP Monitoring: is not measured at home. Pertinent ROS: taking medications as instructed, no medication side effects noted, no TIA's, no chest pain on exertion, no dyspnea on exertion, no swelling of ankles. Diabetes Mellitus:  Diabetic ROS - medication compliance: compliant all of the time, diabetic diet compliance: compliant most of the time, home glucose monitoring: is performed regularly. Ranging   Further diabetic ROS: no polyuria or polydipsia, no chest pain, dyspnea or TIA's, no numbness, tingling or pain in extremities. Last eye exam approximately <1yr ago. Mental Health , Sleep and Chronic pain due to Fibromyalgia:  History of anxiety, insomnia, fibromyalgia. Denies depressed mood. Anxiety is stable as well today. Says difficulty sleeping and staying asleep despite use of her medications including amitriptyline and trazodone. She is requesting a dose adjustment to see if this will help. Denies SI/HI. Prior to Admission medications    Medication Sig Start Date End Date Taking? Authorizing Provider   traZODone (DESYREL) 100 mg tablet Take 2 Tablets by mouth nightly. 10/13/22  Yes Victorino Coyle NP   amitriptyline (ELAVIL) 100 mg tablet Take 1 Tablet by mouth nightly. 10/13/22  Yes Shirley Huffman NP   Blood-Glucose Meter (OneTouch Verio Meter) misc CHECK BLOOD GLUCOSE TWICE A DAY, DX: E11.9 10/13/22  Yes Victorino Coyle NP   glucose blood VI test strips (OneTouch Verio test strips) strip CHECK BLOOD GLUCOSE TWICE A DAY, DX: E11.9 10/13/22  Yes Victorino Coyle NP   Insulin Needles, Disposable, 31 gauge x 5/16\" ndle Use one daily. 10/13/22  Yes Shirley Huffman NP   lancets misc Check 2x/day and prn. DX: E11.9 10/13/22  Yes Victorino Coyle NP   liraglutide (Victoza 3-Mirza) 0.6 mg/0.1 mL (18 mg/3 mL) pnij 1.8 mg by SubCUTAneous route daily.  10/13/22  Yes Shirley Huffman NP   clobetasoL (TEMOVATE) 0.05 % ointment APPLY TO AFFECTED AREA TWICE A DAY 10/13/22  Yes Claudia Coyle NP   diclofenac (VOLTAREN) 1 % gel Apply 2 g to affected area four (4) times daily. 10/13/22  Yes Claudia Coyle NP   ondansetron (ZOFRAN ODT) 4 mg disintegrating tablet DISSOLVE ONE TABLET BY MOUTH EVERY 8 HOURS AS NEEDED FOR NAUSEA AND VOMITING 10/7/22  Yes Xiang Ellison MD   tiZANidine (ZANAFLEX) 4 mg tablet Take 1 Tablet by mouth three (3) times daily. 10/6/22  Yes Xiang Ellison MD   pregabalin (LYRICA) 225 mg capsule Take 1 Capsule by mouth three (3) times daily. Max Daily Amount: 675 mg. 10/6/22  Yes Xiang Ellison MD   ubrogepant (UBRELVY) 100 mg tablet 1 at HA onset and repeat in 2 hours x 1 prn  Max 2 tabs/24 hours 10/6/22  Yes Xiang Ellison MD   busPIRone (BUSPAR) 15 mg tablet Take 1 Tablet by mouth three (3) times daily. 9/2/22  Yes Claudia Coyle NP   DULoxetine (CYMBALTA) 60 mg capsule TAKE ONE CAPSULE BY MOUTH EVERY DAY WITH 30 MG FOR A TOTAL DAILY DOSE OF 90 MG 8/29/22  Yes Claudia Coyle NP   DULoxetine (CYMBALTA) 30 mg capsule TAKE ONE CAPSULE BY MOUTH EVERY DAY FOR A TOTAL DAILY DOSE OF 90MG. 8/29/22  Yes Claudia Coyle NP   frovatriptan (FROVA) 2.5 mg tablet Take 1 Tablet by mouth daily as needed for Migraine. 8/26/22  Yes Xiang Ellison MD   Emgality Pen 120 mg/mL injection INJECT 1 ML BY SUBCUTANEOUS ROUTE EVERY THIRTY (30) DAYS. 6/21/22  Yes Irene Choudhury MD   albuterol (PROVENTIL HFA, VENTOLIN HFA, PROAIR HFA) 90 mcg/actuation inhaler INHALE 2 PUFFS BY INHALATION EVERY SIX (6) HOURS AS NEEDED FOR WHEEZING OR COUGH. 5/25/22  Yes Claudia Coyle NP   cholecalciferol (VITAMIN D3) (400 Units /10 mcg) tab tablet Take 2 Tablets by mouth daily. Indications: prevention of vitamin D deficiency 3/6/22  Yes Claudia Coyle, NP   tamsulosin (FLOMAX) 0.4 mg capsule Take 0.4 mg by mouth daily.  1/31/22  Yes Provider, Historical   promethazine (PHENERGAN) 25 mg tablet 1-2 tabs PO every 6 hours prn migraine 1/9/21  Yes Nathalie Damon MD   Creon 36,000-114,000- 180,000 unit cpDR capsule  9/14/20  Yes Provider, Historical   diphenhydrAMINE (BENADRYL) 25 mg tablet Take 75 mg by mouth nightly. Yes Provider, Historical   traZODone (DESYREL) 50 mg tablet Take 3 Tablets by mouth nightly. Indications: insomnia associated with depression 9/2/22 10/13/22  Chris Hernandez NP   amitriptyline (ELAVIL) 50 mg tablet Take 2 Tablets by mouth nightly. To prevent migraine 9/2/22 10/13/22  Chris Hernandez NP   Victoza 3-Mirza 0.6 mg/0.1 mL (18 mg/3 mL) pnij INJECT 1.8MG UNDER THE SKIN AS DIRECTED [facility] MORNING 8/29/22 10/13/22  Chris Hernandez NP   clobetasoL (TEMOVATE) 0.05 % ointment APPLY TO AFFECTED AREA TWICE A DAY 5/16/22 10/13/22  Chris Hernandez NP   Blood-Glucose Meter (OneTouch Verio Meter) misc CHECK BLOOD GLUCOSE TWICE A DAY, DX: E11.9 5/16/22 10/13/22  Chris Hernandez NP   lancets misc Check 2x/day and prn. DX: E11.9 5/16/22 10/13/22  Chris Hernandez NP   glucose blood VI test strips (OneTouch Verio test strips) strip CHECK BLOOD GLUCOSE TWICE A DAY, DX: E11.9 5/16/22 10/13/22  Chris Hernandez NP   onabotulinumtoxinA (BOTOX) 200 unit injection 200 units in 31 sites of head and neck, every 3 months  Patient not taking: Reported on 10/13/2022 3/3/22   Elijah Clarke MD   Insulin Needles, Disposable, 31 gauge x 5/16\" ndle Use one daily. 2/1/22 10/13/22  Chris Hernandez NP         Review of Systems   Constitutional: Negative. HENT: Negative. Respiratory: Negative. Cardiovascular: Negative. Gastrointestinal: Negative. Musculoskeletal:  Positive for back pain and myalgias. Skin: Negative. Neurological: Negative. Psychiatric/Behavioral:  Negative for suicidal ideas. The patient has insomnia. The patient is not nervous/anxious.       Objective:     Patient-Reported Vitals 10/13/2022   Patient-Reported Weight 200   Patient-Reported Height -   Patient-Reported Pulse - Patient-Reported Temperature -   Patient-Reported SpO2 -   Patient-Reported Systolic  -   Patient-Reported Diastolic -        [INSTRUCTIONS:  \"[x]\" Indicates a positive item  \"[]\" Indicates a negative item  -- DELETE ALL ITEMS NOT EXAMINED]    Constitutional: [x] Appears well-developed and well-nourished [x] No apparent distress      [] Abnormal -     Mental status: [x] Alert and awake  [x] Oriented to person/place/time [x] Able to follow commands    [] Abnormal -     Eyes:   EOM    [x]  Normal    [] Abnormal -   Sclera  [x]  Normal    [] Abnormal -          Discharge [x]  None visible   [] Abnormal -     HENT: [x] Normocephalic, atraumatic  [] Abnormal -   [x] Mouth/Throat: Mucous membranes are moist    External Ears [x] Normal  [] Abnormal -    Neck: [x] No visualized mass [] Abnormal -     Pulmonary/Chest: [x] Respiratory effort normal   [x] No visualized signs of difficulty breathing or respiratory distress        [] Abnormal -      Musculoskeletal:   [x] Normal gait with no signs of ataxia         [x] Normal range of motion of neck        [] Abnormal -     Neurological:        [x] No Facial Asymmetry (Cranial nerve 7 motor function) (limited exam due to video visit)          [x] No gaze palsy        [] Abnormal -          Skin:        [x] No significant exanthematous lesions or discoloration noted on facial skin         [] Abnormal -            Psychiatric:       [x] Normal Affect [] Abnormal -        [x] No Hallucinations    Other pertinent observable physical exam findings:-        We discussed the expected course, resolution and complications of the diagnosis(es) in detail. Medication risks, benefits, costs, interactions, and alternatives were discussed as indicated. I advised her to contact the office if her condition worsens, changes or fails to improve as anticipated. She expressed understanding with the diagnosis(es) and plan.        Matt Croft, was evaluated through a synchronous (real-time) audio-video encounter. The patient (or guardian if applicable) is aware that this is a billable service, which includes applicable co-pays. This Virtual Visit was conducted with patient's (and/or legal guardian's) consent. The visit was conducted pursuant to the emergency declaration under the [address] [place] [address], [address] [place] [address] authority and the [employer] and [employer] Act. Patient identification was verified, and a caregiver was present when appropriate. The patient was located at: Other: Work  The provider was located at:  Facility (Appt Department): [address] [place] [address]        Gia Mariscal NP

## 2022-10-17 DIAGNOSIS — F41.1 GAD (GENERALIZED ANXIETY DISORDER): ICD-10-CM

## 2022-10-17 DIAGNOSIS — M79.7 FIBROMYALGIA: ICD-10-CM

## 2022-10-18 RX ORDER — DULOXETIN HYDROCHLORIDE 60 MG/1
CAPSULE, DELAYED RELEASE ORAL
Qty: 30 CAPSULE | Refills: 0 | Status: SHIPPED | OUTPATIENT
Start: 2022-10-18

## 2022-10-18 RX ORDER — BUSPIRONE HYDROCHLORIDE 15 MG/1
TABLET ORAL
Qty: 90 TABLET | Refills: 0 | Status: SHIPPED | OUTPATIENT
Start: 2022-10-18

## 2022-10-18 RX ORDER — DULOXETIN HYDROCHLORIDE 30 MG/1
CAPSULE, DELAYED RELEASE ORAL
Qty: 30 CAPSULE | Refills: 0 | Status: SHIPPED | OUTPATIENT
Start: 2022-10-18

## 2022-11-05 DIAGNOSIS — G93.2 IDIOPATHIC INTRACRANIAL HYPERTENSION: ICD-10-CM

## 2022-11-07 RX ORDER — ONDANSETRON 4 MG/1
TABLET, ORALLY DISINTEGRATING ORAL
Qty: 30 TABLET | Refills: 0 | Status: SHIPPED | OUTPATIENT
Start: 2022-11-07 | End: 2022-11-16

## 2022-11-15 DIAGNOSIS — G93.2 IDIOPATHIC INTRACRANIAL HYPERTENSION: ICD-10-CM

## 2022-11-16 DIAGNOSIS — M79.662 PAIN IN BOTH LOWER LEGS: ICD-10-CM

## 2022-11-16 DIAGNOSIS — M79.7 FIBROMYALGIA: ICD-10-CM

## 2022-11-16 DIAGNOSIS — M79.661 PAIN IN BOTH LOWER LEGS: ICD-10-CM

## 2022-11-16 RX ORDER — ONDANSETRON 4 MG/1
TABLET, ORALLY DISINTEGRATING ORAL
Qty: 30 TABLET | Refills: 0 | Status: SHIPPED | OUTPATIENT
Start: 2022-11-16 | End: 2022-11-22

## 2022-11-17 RX ORDER — PREGABALIN 225 MG/1
CAPSULE ORAL
Qty: 90 CAPSULE | Refills: 1 | Status: SHIPPED | OUTPATIENT
Start: 2022-11-17

## 2022-11-21 DIAGNOSIS — G93.2 IDIOPATHIC INTRACRANIAL HYPERTENSION: ICD-10-CM

## 2022-11-21 DIAGNOSIS — G43.709 CHRONIC MIGRAINE W/O AURA W/O STATUS MIGRAINOSUS, NOT INTRACTABLE: ICD-10-CM

## 2022-11-22 ENCOUNTER — PATIENT MESSAGE (OUTPATIENT)
Dept: FAMILY MEDICINE CLINIC | Age: 47
End: 2022-11-22

## 2022-11-22 DIAGNOSIS — F41.1 GAD (GENERALIZED ANXIETY DISORDER): ICD-10-CM

## 2022-11-22 DIAGNOSIS — M79.7 FIBROMYALGIA: ICD-10-CM

## 2022-11-22 RX ORDER — ONDANSETRON 4 MG/1
TABLET, ORALLY DISINTEGRATING ORAL
Qty: 30 TABLET | Refills: 0 | Status: SHIPPED | OUTPATIENT
Start: 2022-11-22

## 2022-11-22 RX ORDER — DULOXETIN HYDROCHLORIDE 60 MG/1
CAPSULE, DELAYED RELEASE ORAL
Qty: 30 CAPSULE | Refills: 0 | Status: SHIPPED | OUTPATIENT
Start: 2022-11-22

## 2022-11-22 RX ORDER — DULOXETIN HYDROCHLORIDE 30 MG/1
CAPSULE, DELAYED RELEASE ORAL
Qty: 30 CAPSULE | Refills: 0 | Status: SHIPPED | OUTPATIENT
Start: 2022-11-22

## 2022-11-22 RX ORDER — GALCANEZUMAB 120 MG/ML
INJECTION, SOLUTION SUBCUTANEOUS
Qty: 1 ML | Refills: 3 | Status: SHIPPED | OUTPATIENT
Start: 2022-11-22

## 2022-11-22 NOTE — TELEPHONE ENCOUNTER
PCP: Shirley Huffman NP    Last appt: 10/13/2022  No future appointments.     Requested Prescriptions     Pending Prescriptions Disp Refills    DULoxetine (CYMBALTA) 30 mg capsule 30 Capsule 0    DULoxetine (CYMBALTA) 60 mg capsule 30 Capsule 0         Prior labs and Blood pressures:  BP Readings from Last 3 Encounters:   02/10/22 107/70   01/26/22 137/77   01/18/21 130/80     Lab Results   Component Value Date/Time    Sodium 141 02/10/2022 09:53 AM    Potassium 4.2 02/10/2022 09:53 AM    Chloride 109 (H) 02/10/2022 09:53 AM    CO2 30 02/10/2022 09:53 AM    Anion gap 2 (L) 02/10/2022 09:53 AM    Glucose 81 02/10/2022 09:53 AM    BUN 11 02/10/2022 09:53 AM    Creatinine 0.63 02/10/2022 09:53 AM    BUN/Creatinine ratio 17 02/10/2022 09:53 AM    GFR est AA >60 02/10/2022 09:53 AM    GFR est non-AA >60 02/10/2022 09:53 AM    Calcium 9.4 02/10/2022 09:53 AM     Lab Results   Component Value Date/Time    Hemoglobin A1c 6.5 (H) 04/22/2020 11:14 AM    Hemoglobin A1c (POC) 5.3 02/10/2022 08:55 AM     Lab Results   Component Value Date/Time    Cholesterol, total 171 02/10/2022 09:53 AM    HDL Cholesterol 60 02/10/2022 09:53 AM    LDL, calculated 93.4 02/10/2022 09:53 AM    VLDL, calculated 17.6 02/10/2022 09:53 AM    Triglyceride 88 02/10/2022 09:53 AM    CHOL/HDL Ratio 2.9 02/10/2022 09:53 AM     Lab Results   Component Value Date/Time    Vitamin D 25-Hydroxy 40.3 02/10/2022 09:53 AM       No results found for: TSH, TSH2, TSH3, TSHP, TSHEXT

## 2022-12-06 DIAGNOSIS — G43.709 CHRONIC MIGRAINE W/O AURA W/O STATUS MIGRAINOSUS, NOT INTRACTABLE: ICD-10-CM

## 2022-12-06 DIAGNOSIS — M79.7 FIBROMYALGIA: ICD-10-CM

## 2022-12-06 DIAGNOSIS — E66.01 SEVERE OBESITY (HCC): ICD-10-CM

## 2022-12-06 DIAGNOSIS — M79.661 PAIN IN BOTH LOWER LEGS: ICD-10-CM

## 2022-12-06 DIAGNOSIS — E11.9 DIABETES MELLITUS TYPE 2, NONINSULIN DEPENDENT (HCC): ICD-10-CM

## 2022-12-06 DIAGNOSIS — G93.2 IDIOPATHIC INTRACRANIAL HYPERTENSION: ICD-10-CM

## 2022-12-06 DIAGNOSIS — F51.05 INSOMNIA DUE TO OTHER MENTAL DISORDER: ICD-10-CM

## 2022-12-06 DIAGNOSIS — F99 INSOMNIA DUE TO OTHER MENTAL DISORDER: ICD-10-CM

## 2022-12-06 DIAGNOSIS — M79.662 PAIN IN BOTH LOWER LEGS: ICD-10-CM

## 2022-12-06 DIAGNOSIS — F41.1 GAD (GENERALIZED ANXIETY DISORDER): ICD-10-CM

## 2022-12-06 RX ORDER — CLOBETASOL PROPIONATE 0.5 MG/G
OINTMENT TOPICAL
Qty: 60 G | Refills: 1 | Status: SHIPPED | OUTPATIENT
Start: 2022-12-06

## 2022-12-06 RX ORDER — TIZANIDINE 4 MG/1
4 TABLET ORAL 3 TIMES DAILY
Qty: 90 TABLET | Refills: 1 | Status: CANCELLED | OUTPATIENT
Start: 2022-12-06

## 2022-12-06 RX ORDER — LANCETS
EACH MISCELLANEOUS
Qty: 1 EACH | Refills: 11 | Status: SHIPPED | OUTPATIENT
Start: 2022-12-06

## 2022-12-06 RX ORDER — ONDANSETRON 4 MG/1
TABLET, ORALLY DISINTEGRATING ORAL
Qty: 30 TABLET | Refills: 0 | Status: CANCELLED | OUTPATIENT
Start: 2022-12-06

## 2022-12-06 RX ORDER — LIRAGLUTIDE 6 MG/ML
1.8 INJECTION SUBCUTANEOUS DAILY
Qty: 9 ML | Refills: 1 | Status: SHIPPED | OUTPATIENT
Start: 2022-12-06

## 2022-12-06 RX ORDER — BLOOD SUGAR DIAGNOSTIC
STRIP MISCELLANEOUS
Qty: 100 STRIP | Refills: 1 | Status: SHIPPED | OUTPATIENT
Start: 2022-12-06

## 2022-12-06 RX ORDER — TRAZODONE HYDROCHLORIDE 100 MG/1
200 TABLET ORAL
Qty: 180 TABLET | Refills: 1 | Status: SHIPPED | OUTPATIENT
Start: 2022-12-06

## 2022-12-06 RX ORDER — DULOXETIN HYDROCHLORIDE 60 MG/1
CAPSULE, DELAYED RELEASE ORAL
Qty: 30 CAPSULE | Refills: 0 | Status: SHIPPED | OUTPATIENT
Start: 2022-12-06

## 2022-12-06 RX ORDER — DICLOFENAC SODIUM 10 MG/G
2 GEL TOPICAL 4 TIMES DAILY
Qty: 450 G | Refills: 1 | Status: SHIPPED | OUTPATIENT
Start: 2022-12-06

## 2022-12-06 RX ORDER — PEN NEEDLE, DIABETIC 30 GX3/16"
NEEDLE, DISPOSABLE MISCELLANEOUS
Qty: 1 EACH | Refills: 11 | Status: SHIPPED | OUTPATIENT
Start: 2022-12-06

## 2022-12-06 RX ORDER — GALCANEZUMAB 120 MG/ML
INJECTION, SOLUTION SUBCUTANEOUS
Qty: 1 ML | Refills: 3 | Status: CANCELLED | OUTPATIENT
Start: 2022-12-06

## 2022-12-06 RX ORDER — AMITRIPTYLINE HYDROCHLORIDE 100 MG/1
100 TABLET, FILM COATED ORAL
Qty: 90 TABLET | Refills: 1 | Status: SHIPPED | OUTPATIENT
Start: 2022-12-06

## 2022-12-06 RX ORDER — DULOXETIN HYDROCHLORIDE 30 MG/1
CAPSULE, DELAYED RELEASE ORAL
Qty: 30 CAPSULE | Refills: 0 | Status: SHIPPED | OUTPATIENT
Start: 2022-12-06

## 2022-12-06 RX ORDER — BUSPIRONE HYDROCHLORIDE 15 MG/1
15 TABLET ORAL 3 TIMES DAILY
Qty: 90 TABLET | Refills: 2 | Status: SHIPPED | OUTPATIENT
Start: 2022-12-06

## 2022-12-06 NOTE — TELEPHONE ENCOUNTER
PCP: Nati Vasquez NP    Last appt: 10/13/2022  No future appointments. Requested Prescriptions     Pending Prescriptions Disp Refills    traZODone (DESYREL) 100 mg tablet 180 Tablet 1     Sig: Take 2 Tablets by mouth nightly. amitriptyline (ELAVIL) 100 mg tablet 90 Tablet 1     Sig: Take 1 Tablet by mouth nightly. glucose blood VI test strips (OneTouch Verio test strips) strip 100 Strip 1     Sig: CHECK BLOOD GLUCOSE TWICE A DAY, DX: E11.9    Insulin Needles, Disposable, 31 gauge x 5/16\" ndle 1 Each 11     Sig: Use one daily. lancets misc 1 Each 11     Sig: Check 2x/day and prn. DX: E11.9    liraglutide (Victoza 3-Mirza) 0.6 mg/0.1 mL (18 mg/3 mL) pnij 9 mL 1     Si.8 mg by SubCUTAneous route daily. clobetasoL (TEMOVATE) 0.05 % ointment 60 g 1     Sig: APPLY TO AFFECTED AREA TWICE A DAY    diclofenac (VOLTAREN) 1 % gel 450 g 1     Sig: Apply 2 g to affected area four (4) times daily. busPIRone (BUSPAR) 15 mg tablet 90 Tablet 2     Sig: Take 1 Tablet by mouth three (3) times daily. DULoxetine (CYMBALTA) 30 mg capsule 30 Capsule 0     Sig: TAKE ONE CAPSULE BY MOUTH EVERY DAY WITH A 60MG CAPSULE FOR A TOTAL DAILY DOSE OF 90MG.     DULoxetine (CYMBALTA) 60 mg capsule 30 Capsule 0     Sig: TAKE ONE CAPSULE BY MOUTH EVERY DAY WITH THE 30MG FOR A TOTAL DAILY DOSE OF 90MG         Prior labs and Blood pressures:  BP Readings from Last 3 Encounters:   02/10/22 107/70   22 137/77   21 130/80     Lab Results   Component Value Date/Time    Sodium 141 02/10/2022 09:53 AM    Potassium 4.2 02/10/2022 09:53 AM    Chloride 109 (H) 02/10/2022 09:53 AM    CO2 30 02/10/2022 09:53 AM    Anion gap 2 (L) 02/10/2022 09:53 AM    Glucose 81 02/10/2022 09:53 AM    BUN 11 02/10/2022 09:53 AM    Creatinine 0.63 02/10/2022 09:53 AM    BUN/Creatinine ratio 17 02/10/2022 09:53 AM    GFR est AA >60 02/10/2022 09:53 AM    GFR est non-AA >60 02/10/2022 09:53 AM    Calcium 9.4 02/10/2022 09:53 AM     Lab Results Component Value Date/Time    Hemoglobin A1c 6.5 (H) 04/22/2020 11:14 AM    Hemoglobin A1c (POC) 5.3 02/10/2022 08:55 AM     Lab Results   Component Value Date/Time    Cholesterol, total 171 02/10/2022 09:53 AM    HDL Cholesterol 60 02/10/2022 09:53 AM    LDL, calculated 93.4 02/10/2022 09:53 AM    VLDL, calculated 17.6 02/10/2022 09:53 AM    Triglyceride 88 02/10/2022 09:53 AM    CHOL/HDL Ratio 2.9 02/10/2022 09:53 AM     Lab Results   Component Value Date/Time    Vitamin D 25-Hydroxy 40.3 02/10/2022 09:53 AM       No results found for: TSH, TSH2, TSH3, TSHP, TSHEXT

## 2022-12-14 DIAGNOSIS — G93.2 IDIOPATHIC INTRACRANIAL HYPERTENSION: ICD-10-CM

## 2022-12-14 RX ORDER — ONDANSETRON 4 MG/1
TABLET, ORALLY DISINTEGRATING ORAL
Qty: 30 TABLET | Refills: 0 | Status: SHIPPED | OUTPATIENT
Start: 2022-12-14

## 2022-12-15 DIAGNOSIS — M79.661 PAIN IN BOTH LOWER LEGS: ICD-10-CM

## 2022-12-15 DIAGNOSIS — M79.662 PAIN IN BOTH LOWER LEGS: ICD-10-CM

## 2022-12-15 DIAGNOSIS — M79.7 FIBROMYALGIA: ICD-10-CM

## 2022-12-26 RX ORDER — TIZANIDINE 4 MG/1
TABLET ORAL
Qty: 90 TABLET | Refills: 1 | Status: SHIPPED | OUTPATIENT
Start: 2022-12-26

## 2023-01-03 DIAGNOSIS — G93.2 IDIOPATHIC INTRACRANIAL HYPERTENSION: ICD-10-CM

## 2023-01-03 RX ORDER — ONDANSETRON 4 MG/1
TABLET, ORALLY DISINTEGRATING ORAL
Qty: 30 TABLET | Refills: 0 | Status: SHIPPED | OUTPATIENT
Start: 2023-01-03

## 2023-01-09 DIAGNOSIS — M79.7 FIBROMYALGIA: ICD-10-CM

## 2023-01-09 DIAGNOSIS — M79.661 PAIN IN BOTH LOWER LEGS: ICD-10-CM

## 2023-01-09 DIAGNOSIS — M79.662 PAIN IN BOTH LOWER LEGS: ICD-10-CM

## 2023-01-10 RX ORDER — TIZANIDINE 4 MG/1
TABLET ORAL
Qty: 90 TABLET | Refills: 0 | Status: SHIPPED | OUTPATIENT
Start: 2023-01-10

## 2023-01-18 PROBLEM — R42 VERTIGO: Status: ACTIVE | Noted: 2023-01-18

## 2023-01-18 PROBLEM — I10 PRIMARY HYPERTENSION: Status: ACTIVE | Noted: 2023-01-18

## 2023-01-18 PROBLEM — G93.2 PSEUDOTUMOR CEREBRI: Status: ACTIVE | Noted: 2023-01-18

## 2023-01-18 PROBLEM — M50.30 DEGENERATION OF INTERVERTEBRAL DISC OF CERVICAL REGION: Status: ACTIVE | Noted: 2023-01-18

## 2023-01-18 PROBLEM — C53.9 CERVICAL CANCER: Status: ACTIVE | Noted: 2023-01-18

## 2023-01-18 PROBLEM — J42 CHRONIC BRONCHITIS: Status: ACTIVE | Noted: 2023-01-18

## 2023-01-18 PROBLEM — R51.9 HEADACHE: Status: ACTIVE | Noted: 2023-01-18

## 2023-01-18 PROBLEM — M51.36 DEGENERATION OF INTERVERTEBRAL DISC OF LUMBAR REGION: Status: ACTIVE | Noted: 2023-01-18

## 2023-01-18 PROBLEM — G90.A POTS (POSTURAL ORTHOSTATIC TACHYCARDIA SYNDROME): Status: ACTIVE | Noted: 2023-01-18

## 2023-01-18 PROBLEM — N20.0 RECURRENT KIDNEY STONES: Status: ACTIVE | Noted: 2023-01-18

## 2023-01-18 PROBLEM — J45.909 ASTHMA: Status: ACTIVE | Noted: 2023-01-18

## 2023-01-18 PROBLEM — G47.00 INSOMNIA: Status: ACTIVE | Noted: 2023-01-18

## 2023-01-18 PROBLEM — L84 CALLUS OF FOOT: Status: ACTIVE | Noted: 2023-01-18

## 2023-01-18 PROBLEM — F41.9 ANXIETY: Status: ACTIVE | Noted: 2023-01-18

## 2023-01-18 PROBLEM — D35.02 ADENOMA OF LEFT ADRENAL GLAND: Status: ACTIVE | Noted: 2023-01-18

## 2023-01-18 PROBLEM — G43.909 MIGRAINE: Status: ACTIVE | Noted: 2023-01-18

## 2023-01-18 PROBLEM — M79.7 FIBROMYALGIA: Status: ACTIVE | Noted: 2023-01-18

## 2023-01-18 PROBLEM — M48.062 SPINAL STENOSIS OF LUMBAR REGION WITH NEUROGENIC CLAUDICATION: Status: ACTIVE | Noted: 2023-01-18

## 2023-01-18 PROBLEM — F43.10 POST-TRAUMATIC STRESS: Status: ACTIVE | Noted: 2023-01-18

## 2023-01-18 PROBLEM — Z98.2 S/P VP SHUNT: Status: ACTIVE | Noted: 2023-01-18

## 2023-01-18 PROBLEM — E11.9 TYPE 2 DIABETES MELLITUS, WITHOUT LONG-TERM CURRENT USE OF INSULIN: Status: ACTIVE | Noted: 2023-01-18

## 2023-02-08 DIAGNOSIS — E11.9 DIABETES MELLITUS TYPE 2, NONINSULIN DEPENDENT (HCC): ICD-10-CM

## 2023-02-08 DIAGNOSIS — E66.01 SEVERE OBESITY (HCC): ICD-10-CM

## 2023-02-09 RX ORDER — ALBUTEROL SULFATE 90 UG/1
AEROSOL, METERED RESPIRATORY (INHALATION)
Qty: 18 G | Refills: 1 | Status: SHIPPED | OUTPATIENT
Start: 2023-02-09

## 2023-02-09 RX ORDER — PEN NEEDLE, DIABETIC 30 GX3/16"
NEEDLE, DISPOSABLE MISCELLANEOUS
Qty: 940 EACH | Refills: 0 | Status: SHIPPED | OUTPATIENT
Start: 2023-02-09

## 2023-02-15 ENCOUNTER — PATIENT MESSAGE (OUTPATIENT)
Dept: ADMINISTRATIVE | Facility: OTHER | Age: 48
End: 2023-02-15

## 2023-02-16 DIAGNOSIS — G93.2 IDIOPATHIC INTRACRANIAL HYPERTENSION: ICD-10-CM

## 2023-02-16 RX ORDER — ONDANSETRON 4 MG/1
TABLET, ORALLY DISINTEGRATING ORAL
Qty: 30 TABLET | Refills: 0 | Status: SHIPPED | OUTPATIENT
Start: 2023-02-16

## 2023-02-27 DIAGNOSIS — E66.01 SEVERE OBESITY (HCC): ICD-10-CM

## 2023-02-27 DIAGNOSIS — G93.2 IDIOPATHIC INTRACRANIAL HYPERTENSION: ICD-10-CM

## 2023-02-27 DIAGNOSIS — E11.9 DIABETES MELLITUS TYPE 2, NONINSULIN DEPENDENT (HCC): ICD-10-CM

## 2023-02-27 RX ORDER — ONDANSETRON 4 MG/1
TABLET, ORALLY DISINTEGRATING ORAL
Qty: 30 TABLET | Refills: 0 | Status: SHIPPED | OUTPATIENT
Start: 2023-02-27

## 2023-02-27 RX ORDER — PEN NEEDLE, DIABETIC 30 GX3/16"
NEEDLE, DISPOSABLE MISCELLANEOUS
Qty: 940 EACH | Refills: 0 | Status: SHIPPED | OUTPATIENT
Start: 2023-02-27

## 2023-03-13 DIAGNOSIS — M79.7 FIBROMYALGIA: ICD-10-CM

## 2023-03-13 DIAGNOSIS — G43.709 CHRONIC MIGRAINE W/O AURA W/O STATUS MIGRAINOSUS, NOT INTRACTABLE: ICD-10-CM

## 2023-03-13 DIAGNOSIS — M79.661 PAIN IN BOTH LOWER LEGS: ICD-10-CM

## 2023-03-13 DIAGNOSIS — G93.2 IDIOPATHIC INTRACRANIAL HYPERTENSION: ICD-10-CM

## 2023-03-13 DIAGNOSIS — M79.662 PAIN IN BOTH LOWER LEGS: ICD-10-CM

## 2023-03-13 RX ORDER — TIZANIDINE 4 MG/1
4 TABLET ORAL 3 TIMES DAILY
Qty: 90 TABLET | Refills: 0 | Status: SHIPPED | OUTPATIENT
Start: 2023-03-13

## 2023-03-13 RX ORDER — GALCANEZUMAB 120 MG/ML
INJECTION, SOLUTION SUBCUTANEOUS
Qty: 1 ML | Refills: 3 | OUTPATIENT
Start: 2023-03-13

## 2023-03-13 RX ORDER — ONDANSETRON 4 MG/1
TABLET, ORALLY DISINTEGRATING ORAL
Qty: 30 TABLET | Refills: 0 | Status: CANCELLED | OUTPATIENT
Start: 2023-03-13

## 2023-03-13 RX ORDER — GALCANEZUMAB 120 MG/ML
INJECTION, SOLUTION SUBCUTANEOUS
Qty: 1 ML | Refills: 3 | Status: SHIPPED | OUTPATIENT
Start: 2023-03-13

## 2023-03-13 RX ORDER — PREGABALIN 225 MG/1
CAPSULE ORAL
Qty: 90 CAPSULE | Refills: 1 | OUTPATIENT
Start: 2023-03-13

## 2023-03-13 NOTE — TELEPHONE ENCOUNTER
Patient called, verified, advised she has not been evaluated in over a year, patient stated loudly saying \" so it's my fault he Is booked out and then my medicine doesn't get filled?!\" Patient advised that she was due for a follow up in 07/2022, and per our office policy, patient's on controlled medications need to be seen every 6 months for medication continuance.

## 2023-03-17 ENCOUNTER — PATIENT MESSAGE (OUTPATIENT)
Dept: ADMINISTRATIVE | Facility: OTHER | Age: 48
End: 2023-03-17

## 2023-03-25 ENCOUNTER — TELEPHONE (OUTPATIENT)
Dept: NEUROLOGY | Age: 48
End: 2023-03-25

## 2023-03-25 DIAGNOSIS — E55.9 VITAMIN D INSUFFICIENCY: ICD-10-CM

## 2023-03-26 NOTE — TELEPHONE ENCOUNTER
Re: Leonel FOFANA request, pt has not been seen since 2022, follow up appt needed. Sent update to nurse.

## 2023-03-28 RX ORDER — CHOLECALCIFEROL (VITAMIN D3) 10 MCG
TABLET ORAL
Qty: 420 TABLET | Refills: 0 | Status: SHIPPED | OUTPATIENT
Start: 2023-03-28

## 2023-04-04 ENCOUNTER — PATIENT OUTREACH (OUTPATIENT)
Dept: ADMINISTRATIVE | Facility: HOSPITAL | Age: 48
End: 2023-04-04

## 2023-04-04 DIAGNOSIS — Z12.12 SCREENING FOR COLORECTAL CANCER: Primary | ICD-10-CM

## 2023-04-04 DIAGNOSIS — Z12.11 SCREENING FOR COLORECTAL CANCER: Primary | ICD-10-CM

## 2023-04-19 DIAGNOSIS — E11.9 DIABETES MELLITUS TYPE 2, NONINSULIN DEPENDENT (HCC): ICD-10-CM

## 2023-04-19 DIAGNOSIS — E66.01 SEVERE OBESITY (HCC): ICD-10-CM

## 2023-04-21 RX ORDER — LIRAGLUTIDE 6 MG/ML
INJECTION SUBCUTANEOUS
Qty: 9 ML | Refills: 0 | Status: SHIPPED | OUTPATIENT
Start: 2023-04-21

## 2023-04-21 NOTE — TELEPHONE ENCOUNTER
Spoke to pt on 4.21.23 informed the pt that her medication was approved,and that NP Jonna needed her to make an overdue follow-up. I asked the pt if she could make the appt when speaking with her. \"Pt stated that she will go on her My Chart to schedule the Appt.

## 2023-04-24 DIAGNOSIS — M79.7 FIBROMYALGIA: ICD-10-CM

## 2023-04-24 DIAGNOSIS — M79.661 PAIN IN BOTH LOWER LEGS: ICD-10-CM

## 2023-04-24 DIAGNOSIS — M79.662 PAIN IN BOTH LOWER LEGS: ICD-10-CM

## 2023-04-25 RX ORDER — TIZANIDINE 4 MG/1
TABLET ORAL
Qty: 90 TABLET | Refills: 1 | Status: SHIPPED | OUTPATIENT
Start: 2023-04-25

## 2023-04-26 DIAGNOSIS — E55.9 VITAMIN D INSUFFICIENCY: ICD-10-CM

## 2023-04-26 DIAGNOSIS — M79.7 FIBROMYALGIA: ICD-10-CM

## 2023-04-28 RX ORDER — DICLOFENAC SODIUM 10 MG/G
GEL TOPICAL
Qty: 450 G | Refills: 1 | Status: SHIPPED | OUTPATIENT
Start: 2023-04-28

## 2023-04-28 RX ORDER — CHOLECALCIFEROL (VITAMIN D3) 10 MCG
TABLET ORAL
Qty: 420 TABLET | Refills: 0 | Status: SHIPPED | OUTPATIENT
Start: 2023-04-28

## 2023-05-10 ENCOUNTER — PATIENT MESSAGE (OUTPATIENT)
Dept: ADMINISTRATIVE | Facility: OTHER | Age: 48
End: 2023-05-10

## 2023-05-12 NOTE — TELEPHONE ENCOUNTER
PCP: GINETTE Hallman NP    Last appt: 10/13/2022   No future appointments.     Requested Prescriptions     Pending Prescriptions Disp Refills    diclofenac sodium (VOLTAREN) 1 % GEL [Pharmacy Med Name: DICLOFENAC SODIUM 1% GEL] 450 g 1     Sig: APPLY 2 GRAMS TOPICALLY TO THE AFFECTED AREA FOUR TIMES A DAY         Prior labs and Blood pressures:  BP Readings from Last 3 Encounters:   02/10/22 107/70   01/26/22 137/77     Lab Results   Component Value Date/Time     02/10/2022 09:53 AM    K 4.2 02/10/2022 09:53 AM     02/10/2022 09:53 AM    CO2 30 02/10/2022 09:53 AM    BUN 11 02/10/2022 09:53 AM    GFRAA >60 02/10/2022 09:53 AM     Lab Results   Component Value Date/Time    [id number] 5.3 02/10/2022 08:55 AM     Lab Results   Component Value Date/Time    CHOL 171 02/10/2022 09:53 AM    HDL 60 02/10/2022 09:53 AM     No results found for: VITD3, VD3RIA    No results found for: TSH, TSH2, TSH3

## 2023-05-16 RX ORDER — OMEGA-3S/DHA/EPA/FISH OIL/D3 300MG-1000
CAPSULE ORAL
Qty: 420 TABLET | Refills: 0 | Status: SHIPPED | OUTPATIENT
Start: 2023-05-16

## 2023-05-16 NOTE — TELEPHONE ENCOUNTER
PCP: GINETTE Mejia NP    Last appt: 10/13/2022   No future appointments.     Requested Prescriptions     Pending Prescriptions Disp Refills    vitamin D3 (CHOLECALCIFEROL) 10 MCG (400 UNIT) TABS tablet [Pharmacy Med Name: VITAMIN D3 400 UNIT TABLET] 420 tablet 0     Sig: TAKE TWO TABLETS BY MOUTH EVERY DAY FOR PREVENTION OF VITAMIN D. DEFICIENCY         Prior labs and Blood pressures:  BP Readings from Last 3 Encounters:   02/10/22 107/70   01/26/22 137/77     Lab Results   Component Value Date/Time     02/10/2022 09:53 AM    K 4.2 02/10/2022 09:53 AM     02/10/2022 09:53 AM    CO2 30 02/10/2022 09:53 AM    BUN 11 02/10/2022 09:53 AM    GFRAA >60 02/10/2022 09:53 AM     Lab Results   Component Value Date/Time    [id number] 5.3 02/10/2022 08:55 AM     Lab Results   Component Value Date/Time    CHOL 171 02/10/2022 09:53 AM    HDL 60 02/10/2022 09:53 AM     No results found for: VITD3, VD3RIA    No results found for: TSH, TSH2, TSH3

## 2023-05-30 RX ORDER — FROVATRIPTAN SUCCINATE 2.5 MG/1
2.5 TABLET, FILM COATED ORAL DAILY PRN
COMMUNITY
Start: 2022-08-26

## 2023-05-30 RX ORDER — TAMSULOSIN HYDROCHLORIDE 0.4 MG/1
0.4 CAPSULE ORAL DAILY
COMMUNITY
Start: 2022-01-31

## 2023-05-30 RX ORDER — DULOXETIN HYDROCHLORIDE 30 MG/1
CAPSULE, DELAYED RELEASE ORAL
COMMUNITY
Start: 2022-10-18

## 2023-05-30 RX ORDER — PREGABALIN 225 MG/1
CAPSULE ORAL
COMMUNITY
Start: 2022-11-17

## 2023-05-30 RX ORDER — TIZANIDINE 4 MG/1
1 TABLET ORAL 3 TIMES DAILY
COMMUNITY
Start: 2023-04-25

## 2023-05-30 RX ORDER — LANCETS 30 GAUGE
EACH MISCELLANEOUS
COMMUNITY
Start: 2022-12-06

## 2023-05-30 RX ORDER — TRAZODONE HYDROCHLORIDE 100 MG/1
2 TABLET ORAL NIGHTLY
COMMUNITY
Start: 2022-12-06

## 2023-05-30 RX ORDER — PROMETHAZINE HYDROCHLORIDE 25 MG/1
TABLET ORAL
COMMUNITY
Start: 2021-01-09

## 2023-05-30 RX ORDER — LIRAGLUTIDE 6 MG/ML
INJECTION SUBCUTANEOUS
COMMUNITY
Start: 2023-04-21

## 2023-05-30 RX ORDER — ALBUTEROL SULFATE 90 UG/1
AEROSOL, METERED RESPIRATORY (INHALATION)
COMMUNITY
Start: 2023-02-09

## 2023-05-30 RX ORDER — PANCRELIPASE 36000; 180000; 114000 [USP'U]/1; [USP'U]/1; [USP'U]/1
CAPSULE, DELAYED RELEASE PELLETS ORAL
COMMUNITY
Start: 2020-09-14

## 2023-05-30 RX ORDER — AMITRIPTYLINE HYDROCHLORIDE 100 MG/1
1 TABLET ORAL NIGHTLY
COMMUNITY
Start: 2022-12-06

## 2023-05-30 RX ORDER — DULOXETIN HYDROCHLORIDE 60 MG/1
CAPSULE, DELAYED RELEASE ORAL
COMMUNITY
Start: 2022-10-18

## 2023-05-30 RX ORDER — ONDANSETRON 4 MG/1
TABLET, ORALLY DISINTEGRATING ORAL
COMMUNITY
Start: 2023-02-27

## 2023-05-30 RX ORDER — GALCANEZUMAB 120 MG/ML
INJECTION, SOLUTION SUBCUTANEOUS
COMMUNITY
Start: 2023-03-13

## 2023-05-30 RX ORDER — CLOBETASOL PROPIONATE 0.5 MG/G
OINTMENT TOPICAL 2 TIMES DAILY
COMMUNITY
Start: 2022-12-06

## 2023-05-30 RX ORDER — OMEGA-3S/DHA/EPA/FISH OIL/D3 300MG-1000
CAPSULE ORAL
COMMUNITY
Start: 2023-04-28

## 2023-05-30 RX ORDER — BUSPIRONE HYDROCHLORIDE 15 MG/1
15 TABLET ORAL 3 TIMES DAILY
COMMUNITY
Start: 2022-12-06 | End: 2023-06-29

## 2023-06-05 ENCOUNTER — TELEPHONE (OUTPATIENT)
Age: 48
End: 2023-06-05

## 2023-06-05 NOTE — TELEPHONE ENCOUNTER
Jens Serrato called on behalf of [address] stated the [employer] says the pt is on diabetes med and she also needs to be on cholesterol med. Caller requested callback from the nurse to provide more dentals.      BCB# [phone]

## 2023-06-08 NOTE — TELEPHONE ENCOUNTER
LVM on 6.8.23 informed pt to callback the office. When pt calls back pt need's to make an In Office Appt with RAFFI Culp. Pt cannot do a VV pt's last Appt was on 10.13.22. Please see previous encounter.

## 2023-06-15 NOTE — TELEPHONE ENCOUNTER
73 yo F s/p  ventral hernia repair, mesh explant with posterior component separation and TAR 6/12/2023. Doing well. Return of bowel function this morning. Unfortunately fell on her way to bathroom this morning and hit her head, braced fall with right arm. Will obtain STAT imaging of her head and right arm.     - Scheduled multimodals, PRN narcotics  - Multimodal pain regimen: gabapentin, robaxin, toradol, lidocaine patch  - Scheduled antiemetics  - CLD with boost   - Routine LUIS MIGUEL drain care, record output   - Home meds   - OOB, ambulate  - PT/OT  - DVT PPX     Patient mychart request to be sent to [place] [id number], [place]. Thanks, Nancy Jordan    Last Visit: 2/10/22 NP Dianelys Parada  Next Appointment: None  Previous Refill Encounter(s): 2/10/22 4 + 2     Requested Prescriptions     Pending Prescriptions Disp Refills    liraglutide (VICTOZA) 0.6 mg/0.1 mL (18 mg/3 mL) pnij 4 Each 0     Sig: Take 1.8 mg by SubCUTAneous route in the morning. For [facility] in place:   Recommendation Provided To:    Intervention Detail: New Rx: 1, reason: Patient Preference  Gap Closed?:   Intervention Accepted By:   Time Spent (min): 5

## 2023-06-19 NOTE — TELEPHONE ENCOUNTER
PCP: GINETTE Terrell NP    Last appt: 10/13/2022   No future appointments.     Requested Prescriptions     Pending Prescriptions Disp Refills    diclofenac sodium (VOLTAREN) 1 % GEL [Pharmacy Med Name: DICLOFENAC SODIUM 1% GEL] 450 g 1     Sig: APPLY 2 GRAMS TOPICALLY TO THE AFFECTED AREA FOUR TIMES A DAY         Prior labs and Blood pressures:  BP Readings from Last 3 Encounters:   02/10/22 107/70   01/26/22 137/77     Lab Results   Component Value Date/Time     02/10/2022 09:53 AM    K 4.2 02/10/2022 09:53 AM     02/10/2022 09:53 AM    CO2 30 02/10/2022 09:53 AM    BUN 11 02/10/2022 09:53 AM    GFRAA >60 02/10/2022 09:53 AM     Lab Results   Component Value Date/Time    [id number] 5.3 02/10/2022 08:55 AM     Lab Results   Component Value Date/Time    CHOL 171 02/10/2022 09:53 AM    HDL 60 02/10/2022 09:53 AM     No results found for: VITD3, VD3RIA    No results found for: TSH, TSH2, TSH3

## 2023-06-29 RX ORDER — BUSPIRONE HYDROCHLORIDE 15 MG/1
TABLET ORAL
Qty: 90 TABLET | Refills: 2 | Status: SHIPPED | OUTPATIENT
Start: 2023-06-29

## 2023-06-30 PROBLEM — R31.0 GROSS HEMATURIA: Status: ACTIVE | Noted: 2023-06-30

## 2023-07-14 PROBLEM — N20.0 KIDNEY STONE: Status: ACTIVE | Noted: 2023-07-14

## 2023-07-20 NOTE — TELEPHONE ENCOUNTER
PCP: GINETTE Booth - NP    Last appt: 10/13/2022   No future appointments.     Requested Prescriptions     Pending Prescriptions Disp Refills    diclofenac sodium (VOLTAREN) 1 % GEL [Pharmacy Med Name: DICLOFENAC SODIUM 1% GEL] 450 g 1     Sig: APPLY 2 GRAMS TOPICALLY TO THE AFFECTED AREA FOUR TIMES A DAY         Prior labs and Blood pressures:  BP Readings from Last 3 Encounters:   02/10/22 107/70   01/26/22 137/77     Lab Results   Component Value Date/Time     02/10/2022 09:53 AM    K 4.2 02/10/2022 09:53 AM     02/10/2022 09:53 AM    CO2 30 02/10/2022 09:53 AM    BUN 11 02/10/2022 09:53 AM    GFRAA >60 02/10/2022 09:53 AM     Lab Results   Component Value Date/Time    [id number] 5.3 02/10/2022 08:55 AM     Lab Results   Component Value Date/Time    CHOL 171 02/10/2022 09:53 AM    HDL 60 02/10/2022 09:53 AM     No results found for: VITD3, VD3RIA    No results found for: TSH, TSH2, TSH3

## 2023-08-08 RX ORDER — GALCANEZUMAB 120 MG/ML
INJECTION, SOLUTION SUBCUTANEOUS
Qty: 1 ML | Refills: 3 | OUTPATIENT
Start: 2023-08-08

## 2023-08-16 RX ORDER — GALCANEZUMAB 120 MG/ML
INJECTION, SOLUTION SUBCUTANEOUS
Qty: 1 ML | Refills: 3 | OUTPATIENT
Start: 2023-08-16

## 2023-08-24 PROBLEM — C53.9 CERVICAL CANCER: Status: RESOLVED | Noted: 2023-01-18 | Resolved: 2023-08-24

## 2023-08-24 PROBLEM — J42 CHRONIC BRONCHITIS: Status: RESOLVED | Noted: 2023-01-18 | Resolved: 2023-08-24

## 2023-08-24 PROBLEM — R42 VERTIGO: Status: RESOLVED | Noted: 2023-01-18 | Resolved: 2023-08-24

## 2023-08-31 ENCOUNTER — TELEPHONE (OUTPATIENT)
Age: 48
End: 2023-08-31

## 2023-08-31 NOTE — TELEPHONE ENCOUNTER
Re: Prasanth BALBUENA request [facility] key# [id number], pt has not been seen is over a yr, nurse last called in April to set up appt but no appt was set up, archived key.

## 2023-10-24 ENCOUNTER — PATIENT MESSAGE (OUTPATIENT)
Dept: ADMINISTRATIVE | Facility: OTHER | Age: 48
End: 2023-10-24

## 2024-04-22 ENCOUNTER — PATIENT MESSAGE (OUTPATIENT)
Dept: ADMINISTRATIVE | Facility: HOSPITAL | Age: 49
End: 2024-04-22

## 2024-04-22 ENCOUNTER — PATIENT OUTREACH (OUTPATIENT)
Dept: ADMINISTRATIVE | Facility: HOSPITAL | Age: 49
End: 2024-04-22

## (undated) DEVICE — TUBING HYDR IRR --

## (undated) DEVICE — FORCEPS BX L240CM JAW DIA2.8MM L CAP W/ NDL MIC MESH TOOTH